# Patient Record
Sex: MALE | Race: WHITE | Employment: OTHER | URBAN - NONMETROPOLITAN AREA
[De-identification: names, ages, dates, MRNs, and addresses within clinical notes are randomized per-mention and may not be internally consistent; named-entity substitution may affect disease eponyms.]

---

## 2017-01-04 RX ORDER — VALSARTAN 160 MG/1
TABLET ORAL
Qty: 90 TABLET | Refills: 3 | Status: SHIPPED | OUTPATIENT
Start: 2017-01-04 | End: 2017-02-03

## 2017-01-16 ENCOUNTER — OFFICE VISIT (OUTPATIENT)
Dept: UROLOGY | Age: 57
End: 2017-01-16

## 2017-01-16 VITALS
SYSTOLIC BLOOD PRESSURE: 142 MMHG | BODY MASS INDEX: 42.66 KG/M2 | WEIGHT: 315 LBS | DIASTOLIC BLOOD PRESSURE: 86 MMHG | HEIGHT: 72 IN

## 2017-01-16 DIAGNOSIS — R97.20 ELEVATED PSA: Primary | ICD-10-CM

## 2017-01-16 LAB
BILIRUBIN URINE: NEGATIVE
BLOOD URINE, POC: NEGATIVE
CHARACTER, URINE: CLEAR
COLOR, URINE: YELLOW
GLUCOSE URINE: NEGATIVE MG/DL
KETONES, URINE: NEGATIVE
LEUKOCYTE CLUMPS, URINE: NEGATIVE
NITRITE, URINE: NEGATIVE
PH, URINE: 6.5
PROTEIN, URINE: NEGATIVE MG/DL
SPECIFIC GRAVITY, URINE: 1.01 (ref 1–1.03)
UROBILINOGEN, URINE: 0.2 EU/DL

## 2017-01-16 PROCEDURE — 99214 OFFICE O/P EST MOD 30 MIN: CPT | Performed by: UROLOGY

## 2017-01-16 PROCEDURE — 81003 URINALYSIS AUTO W/O SCOPE: CPT | Performed by: UROLOGY

## 2017-01-16 ASSESSMENT — ENCOUNTER SYMPTOMS
NAUSEA: 0
EYE PAIN: 0
COLOR CHANGE: 0
ABDOMINAL PAIN: 0
EYE REDNESS: 0
BACK PAIN: 0
CHEST TIGHTNESS: 0
SHORTNESS OF BREATH: 0

## 2017-02-03 ENCOUNTER — OFFICE VISIT (OUTPATIENT)
Dept: FAMILY MEDICINE CLINIC | Age: 57
End: 2017-02-03

## 2017-02-03 VITALS
SYSTOLIC BLOOD PRESSURE: 170 MMHG | TEMPERATURE: 97.5 F | BODY MASS INDEX: 42.66 KG/M2 | HEIGHT: 72 IN | DIASTOLIC BLOOD PRESSURE: 98 MMHG | WEIGHT: 315 LBS | HEART RATE: 72 BPM | RESPIRATION RATE: 24 BRPM

## 2017-02-03 DIAGNOSIS — N40.0 PROSTATE HYPERTROPHY: ICD-10-CM

## 2017-02-03 DIAGNOSIS — R73.01 IFG (IMPAIRED FASTING GLUCOSE): ICD-10-CM

## 2017-02-03 DIAGNOSIS — Z00.00 WELL ADULT EXAM: Primary | ICD-10-CM

## 2017-02-03 DIAGNOSIS — D64.9 ANEMIA, UNSPECIFIED TYPE: ICD-10-CM

## 2017-02-03 DIAGNOSIS — E66.01 MORBID OBESITY WITH BMI OF 40.0-44.9, ADULT (HCC): ICD-10-CM

## 2017-02-03 DIAGNOSIS — I10 ESSENTIAL HYPERTENSION: ICD-10-CM

## 2017-02-03 DIAGNOSIS — Z99.89 OBSTRUCTIVE SLEEP APNEA ON CPAP: ICD-10-CM

## 2017-02-03 DIAGNOSIS — F32.A DEPRESSION, UNSPECIFIED DEPRESSION TYPE: ICD-10-CM

## 2017-02-03 DIAGNOSIS — G47.33 OBSTRUCTIVE SLEEP APNEA ON CPAP: ICD-10-CM

## 2017-02-03 PROCEDURE — 99396 PREV VISIT EST AGE 40-64: CPT | Performed by: FAMILY MEDICINE

## 2017-02-03 RX ORDER — VALSARTAN AND HYDROCHLOROTHIAZIDE 160; 12.5 MG/1; MG/1
1 TABLET, FILM COATED ORAL DAILY
Qty: 30 TABLET | Refills: 1 | Status: SHIPPED | OUTPATIENT
Start: 2017-02-03 | End: 2017-03-29 | Stop reason: SDUPTHER

## 2017-02-03 RX ORDER — TAMSULOSIN HYDROCHLORIDE 0.4 MG/1
0.4 CAPSULE ORAL NIGHTLY
Qty: 90 CAPSULE | Refills: 3 | Status: SHIPPED | OUTPATIENT
Start: 2017-02-03 | End: 2018-01-11 | Stop reason: SDUPTHER

## 2017-02-03 ASSESSMENT — ENCOUNTER SYMPTOMS
ANAL BLEEDING: 0
CONSTIPATION: 0
ABDOMINAL PAIN: 0
DIARRHEA: 0
SHORTNESS OF BREATH: 0
VOMITING: 0
CHEST TIGHTNESS: 0
BLOOD IN STOOL: 0
NAUSEA: 0

## 2017-03-29 DIAGNOSIS — I10 ESSENTIAL HYPERTENSION: ICD-10-CM

## 2017-03-29 RX ORDER — VALSARTAN AND HYDROCHLOROTHIAZIDE 160; 12.5 MG/1; MG/1
1 TABLET, FILM COATED ORAL DAILY
Qty: 30 TABLET | Refills: 4 | Status: SHIPPED | OUTPATIENT
Start: 2017-03-29 | End: 2017-08-25 | Stop reason: SDUPTHER

## 2017-03-31 ENCOUNTER — TELEPHONE (OUTPATIENT)
Dept: FAMILY MEDICINE CLINIC | Age: 57
End: 2017-03-31

## 2017-08-23 ENCOUNTER — HOSPITAL ENCOUNTER (OUTPATIENT)
Age: 57
Discharge: HOME | End: 2017-08-23
Payer: COMMERCIAL

## 2017-08-23 DIAGNOSIS — R73.01 IFG (IMPAIRED FASTING GLUCOSE): ICD-10-CM

## 2017-08-23 DIAGNOSIS — R97.20 ELEVATED PSA: ICD-10-CM

## 2017-08-23 DIAGNOSIS — I10 ESSENTIAL HYPERTENSION: ICD-10-CM

## 2017-08-23 DIAGNOSIS — D64.9 ANEMIA, UNSPECIFIED TYPE: ICD-10-CM

## 2017-08-23 LAB
ALBUMIN SERPL-MCNC: 4.4 G/DL (ref 3.5–5.1)
ALP BLD-CCNC: 81 U/L (ref 38–126)
ALT SERPL-CCNC: 22 U/L (ref 11–66)
ANION GAP SERPL CALCULATED.3IONS-SCNC: 6 MEQ/L (ref 8–16)
AST SERPL-CCNC: 24 U/L (ref 5–40)
AVERAGE GLUCOSE: 123 MG/DL (ref 70–126)
BASOPHILS # BLD: 0.6 %
BASOPHILS ABSOLUTE: 0 THOU/MM3 (ref 0–0.1)
BILIRUB SERPL-MCNC: 0.6 MG/DL (ref 0.3–1.2)
BUN BLDV-MCNC: 19 MG/DL (ref 7–22)
CALCIUM SERPL-MCNC: 9.7 MG/DL (ref 8.5–10.5)
CHLORIDE BLD-SCNC: 101 MEQ/L (ref 98–111)
CHOLESTEROL, TOTAL: 188 MG/DL (ref 100–199)
CO2: 30 MEQ/L (ref 23–33)
CREAT SERPL-MCNC: 0.7 MG/DL (ref 0.4–1.2)
CREATININE, URINE: 160 MG/DL
EOSINOPHIL # BLD: 1.5 %
EOSINOPHILS ABSOLUTE: 0.1 THOU/MM3 (ref 0–0.4)
GFR SERPL CREATININE-BSD FRML MDRD: > 90 ML/MIN/1.73M2
GLUCOSE BLD-MCNC: 138 MG/DL (ref 70–108)
HBA1C MFR BLD: 6.1 % (ref 4.4–6.4)
HCT VFR BLD CALC: 43.9 % (ref 42–52)
HDLC SERPL-MCNC: 66 MG/DL
HEMOGLOBIN: 15 GM/DL (ref 14–18)
LDL CHOLESTEROL CALCULATED: 106 MG/DL
LYMPHOCYTES # BLD: 21.1 %
LYMPHOCYTES ABSOLUTE: 1.6 THOU/MM3 (ref 1–4.8)
MCH RBC QN AUTO: 30.1 PG (ref 27–31)
MCHC RBC AUTO-ENTMCNC: 34.1 GM/DL (ref 33–37)
MCV RBC AUTO: 88.3 FL (ref 80–94)
MICROALBUMIN UR-MCNC: < 1.2 MG/DL
MICROALBUMIN/CREAT UR-RTO: 7 MG/G (ref 0–30)
MONOCYTES # BLD: 7.9 %
MONOCYTES ABSOLUTE: 0.6 THOU/MM3 (ref 0.4–1.3)
NUCLEATED RED BLOOD CELLS: 0 /100 WBC
PDW BLD-RTO: 13 % (ref 11.5–14.5)
PLATELET # BLD: 217 THOU/MM3 (ref 130–400)
PMV BLD AUTO: 8.4 MCM (ref 7.4–10.4)
POTASSIUM SERPL-SCNC: 4.3 MEQ/L (ref 3.5–5.2)
PROSTATE SPECIFIC ANTIGEN: 5.16 NG/ML (ref 0–1)
RBC # BLD: 4.97 MILL/MM3 (ref 4.7–6.1)
RBC # BLD: NORMAL 10*6/UL
SEG NEUTROPHILS: 68.9 %
SEGMENTED NEUTROPHILS ABSOLUTE COUNT: 5.4 THOU/MM3 (ref 1.8–7.7)
SODIUM BLD-SCNC: 137 MEQ/L (ref 135–145)
T4 FREE: 0.94 NG/DL (ref 0.93–1.76)
TOTAL PROTEIN: 7.8 G/DL (ref 6.1–8)
TRIGL SERPL-MCNC: 82 MG/DL (ref 0–199)
TSH SERPL DL<=0.05 MIU/L-ACNC: 1.71 UIU/ML (ref 0.4–4.2)
WBC # BLD: 7.8 THOU/MM3 (ref 4.8–10.8)

## 2017-08-23 PROCEDURE — 84439 ASSAY OF FREE THYROXINE: CPT

## 2017-08-23 PROCEDURE — 80050 GENERAL HEALTH PANEL: CPT

## 2017-08-23 PROCEDURE — 83036 HEMOGLOBIN GLYCOSYLATED A1C: CPT

## 2017-08-23 PROCEDURE — 36415 COLL VENOUS BLD VENIPUNCTURE: CPT

## 2017-08-23 PROCEDURE — 82043 UR ALBUMIN QUANTITATIVE: CPT

## 2017-08-23 PROCEDURE — 84153 ASSAY OF PSA TOTAL: CPT

## 2017-08-23 PROCEDURE — 80061 LIPID PANEL: CPT

## 2017-08-25 ENCOUNTER — OFFICE VISIT (OUTPATIENT)
Dept: FAMILY MEDICINE CLINIC | Age: 57
End: 2017-08-25
Payer: COMMERCIAL

## 2017-08-25 VITALS
RESPIRATION RATE: 16 BRPM | HEART RATE: 68 BPM | TEMPERATURE: 97.8 F | DIASTOLIC BLOOD PRESSURE: 90 MMHG | WEIGHT: 315 LBS | BODY MASS INDEX: 45.9 KG/M2 | SYSTOLIC BLOOD PRESSURE: 166 MMHG

## 2017-08-25 DIAGNOSIS — E78.5 HYPERLIPIDEMIA, UNSPECIFIED HYPERLIPIDEMIA TYPE: ICD-10-CM

## 2017-08-25 DIAGNOSIS — F32.A DEPRESSION, UNSPECIFIED DEPRESSION TYPE: ICD-10-CM

## 2017-08-25 DIAGNOSIS — G47.33 OBSTRUCTIVE SLEEP APNEA ON CPAP: ICD-10-CM

## 2017-08-25 DIAGNOSIS — I10 ESSENTIAL HYPERTENSION: ICD-10-CM

## 2017-08-25 DIAGNOSIS — E66.01 MORBID OBESITY WITH BMI OF 40.0-44.9, ADULT (HCC): ICD-10-CM

## 2017-08-25 DIAGNOSIS — Z99.89 OBSTRUCTIVE SLEEP APNEA ON CPAP: ICD-10-CM

## 2017-08-25 DIAGNOSIS — R73.01 IFG (IMPAIRED FASTING GLUCOSE): Primary | ICD-10-CM

## 2017-08-25 DIAGNOSIS — N52.9 ERECTILE DYSFUNCTION, UNSPECIFIED ERECTILE DYSFUNCTION TYPE: ICD-10-CM

## 2017-08-25 PROCEDURE — 99214 OFFICE O/P EST MOD 30 MIN: CPT | Performed by: FAMILY MEDICINE

## 2017-08-25 RX ORDER — VALSARTAN AND HYDROCHLOROTHIAZIDE 160; 12.5 MG/1; MG/1
1 TABLET, FILM COATED ORAL DAILY
Qty: 90 TABLET | Refills: 3 | Status: SHIPPED | OUTPATIENT
Start: 2017-08-25 | End: 2019-03-01

## 2017-08-25 RX ORDER — ATORVASTATIN CALCIUM 10 MG/1
10 TABLET, FILM COATED ORAL DAILY
Qty: 90 TABLET | Refills: 0 | Status: SHIPPED | OUTPATIENT
Start: 2017-08-25 | End: 2018-03-16

## 2017-08-25 RX ORDER — SILDENAFIL 100 MG/1
TABLET, FILM COATED ORAL
Qty: 10 TABLET | Refills: 5 | Status: SHIPPED | OUTPATIENT
Start: 2017-08-25

## 2017-08-25 ASSESSMENT — PATIENT HEALTH QUESTIONNAIRE - PHQ9
1. LITTLE INTEREST OR PLEASURE IN DOING THINGS: 0
SUM OF ALL RESPONSES TO PHQ9 QUESTIONS 1 & 2: 0
2. FEELING DOWN, DEPRESSED OR HOPELESS: 0
SUM OF ALL RESPONSES TO PHQ QUESTIONS 1-9: 0

## 2017-08-25 ASSESSMENT — ENCOUNTER SYMPTOMS
BLOOD IN STOOL: 0
SHORTNESS OF BREATH: 0
DIARRHEA: 0
CONSTIPATION: 0
NAUSEA: 0
ABDOMINAL PAIN: 0
CHEST TIGHTNESS: 0
VOMITING: 0
ANAL BLEEDING: 0

## 2017-08-31 ENCOUNTER — TELEPHONE (OUTPATIENT)
Dept: FAMILY MEDICINE CLINIC | Age: 57
End: 2017-08-31

## 2017-08-31 DIAGNOSIS — E78.5 HYPERLIPIDEMIA, UNSPECIFIED HYPERLIPIDEMIA TYPE: Primary | ICD-10-CM

## 2018-01-11 DIAGNOSIS — N40.0 PROSTATE HYPERTROPHY: ICD-10-CM

## 2018-01-11 RX ORDER — TAMSULOSIN HYDROCHLORIDE 0.4 MG/1
CAPSULE ORAL
Qty: 90 CAPSULE | Refills: 3 | Status: SHIPPED | OUTPATIENT
Start: 2018-01-11 | End: 2019-01-07 | Stop reason: SDUPTHER

## 2018-02-19 DIAGNOSIS — N40.0 PROSTATE HYPERTROPHY: Primary | ICD-10-CM

## 2018-02-22 ENCOUNTER — HOSPITAL ENCOUNTER (OUTPATIENT)
Age: 58
Discharge: HOME | End: 2018-02-22
Payer: COMMERCIAL

## 2018-02-22 DIAGNOSIS — N40.0 PROSTATE HYPERTROPHY: ICD-10-CM

## 2018-02-22 LAB — PROSTATE SPECIFIC ANTIGEN: 4.26 NG/ML (ref 0–1)

## 2018-02-22 PROCEDURE — 84153 ASSAY OF PSA TOTAL: CPT

## 2018-02-22 PROCEDURE — 36415 COLL VENOUS BLD VENIPUNCTURE: CPT

## 2018-03-02 ENCOUNTER — OFFICE VISIT (OUTPATIENT)
Dept: UROLOGY | Age: 58
End: 2018-03-02
Payer: COMMERCIAL

## 2018-03-02 VITALS
HEIGHT: 72 IN | WEIGHT: 315 LBS | DIASTOLIC BLOOD PRESSURE: 78 MMHG | SYSTOLIC BLOOD PRESSURE: 130 MMHG | BODY MASS INDEX: 42.66 KG/M2

## 2018-03-02 DIAGNOSIS — R97.20 ELEVATED PSA: Primary | ICD-10-CM

## 2018-03-02 LAB
BILIRUBIN URINE: NEGATIVE
BLOOD URINE, POC: NORMAL
CHARACTER, URINE: CLEAR
COLOR, URINE: YELLOW
GLUCOSE URINE: NEGATIVE MG/DL
KETONES, URINE: NEGATIVE
LEUKOCYTE CLUMPS, URINE: NEGATIVE
NITRITE, URINE: NEGATIVE
PH, URINE: 6
PROTEIN, URINE: NEGATIVE MG/DL
SPECIFIC GRAVITY, URINE: 1.02 (ref 1–1.03)
UROBILINOGEN, URINE: 0.2 EU/DL

## 2018-03-02 PROCEDURE — 99213 OFFICE O/P EST LOW 20 MIN: CPT | Performed by: NURSE PRACTITIONER

## 2018-03-02 PROCEDURE — 81003 URINALYSIS AUTO W/O SCOPE: CPT | Performed by: NURSE PRACTITIONER

## 2018-03-02 ASSESSMENT — ENCOUNTER SYMPTOMS
ABDOMINAL PAIN: 0
VOMITING: 0
NAUSEA: 0

## 2018-03-13 ENCOUNTER — HOSPITAL ENCOUNTER (OUTPATIENT)
Age: 58
Discharge: HOME | End: 2018-03-13
Payer: COMMERCIAL

## 2018-03-13 DIAGNOSIS — E78.5 HYPERLIPIDEMIA, UNSPECIFIED HYPERLIPIDEMIA TYPE: ICD-10-CM

## 2018-03-13 LAB
CHOLESTEROL, TOTAL: 158 MG/DL (ref 100–199)
HDLC SERPL-MCNC: 50 MG/DL
LDL CHOLESTEROL CALCULATED: 87 MG/DL
TRIGL SERPL-MCNC: 104 MG/DL (ref 0–199)

## 2018-03-13 PROCEDURE — 36415 COLL VENOUS BLD VENIPUNCTURE: CPT

## 2018-03-13 PROCEDURE — 80061 LIPID PANEL: CPT

## 2018-03-16 ENCOUNTER — OFFICE VISIT (OUTPATIENT)
Dept: FAMILY MEDICINE CLINIC | Age: 58
End: 2018-03-16
Payer: COMMERCIAL

## 2018-03-16 VITALS
HEART RATE: 60 BPM | RESPIRATION RATE: 16 BRPM | TEMPERATURE: 97.5 F | HEIGHT: 72 IN | DIASTOLIC BLOOD PRESSURE: 82 MMHG | SYSTOLIC BLOOD PRESSURE: 130 MMHG | WEIGHT: 315 LBS | BODY MASS INDEX: 42.66 KG/M2

## 2018-03-16 DIAGNOSIS — E78.5 HYPERLIPIDEMIA, UNSPECIFIED HYPERLIPIDEMIA TYPE: ICD-10-CM

## 2018-03-16 DIAGNOSIS — N52.9 ERECTILE DYSFUNCTION, UNSPECIFIED ERECTILE DYSFUNCTION TYPE: ICD-10-CM

## 2018-03-16 DIAGNOSIS — L72.3 SEBACEOUS CYST: ICD-10-CM

## 2018-03-16 DIAGNOSIS — I10 ESSENTIAL HYPERTENSION: ICD-10-CM

## 2018-03-16 DIAGNOSIS — Z72.89 OTHER PROBLEMS RELATED TO LIFESTYLE: ICD-10-CM

## 2018-03-16 DIAGNOSIS — L82.1 SEBORRHEIC KERATOSES: ICD-10-CM

## 2018-03-16 DIAGNOSIS — D22.9 ATYPICAL NEVI: ICD-10-CM

## 2018-03-16 DIAGNOSIS — R73.01 IFG (IMPAIRED FASTING GLUCOSE): Primary | ICD-10-CM

## 2018-03-16 PROCEDURE — 99214 OFFICE O/P EST MOD 30 MIN: CPT | Performed by: FAMILY MEDICINE

## 2018-03-16 RX ORDER — OMEGA-3 FATTY ACIDS CAP DELAYED RELEASE 1000 MG 1000 MG
1000 CAPSULE DELAYED RELEASE ORAL DAILY
COMMUNITY
End: 2022-09-08

## 2018-03-16 ASSESSMENT — ENCOUNTER SYMPTOMS
DIARRHEA: 0
CHEST TIGHTNESS: 0
BLOOD IN STOOL: 0
NAUSEA: 0
VOMITING: 0
SHORTNESS OF BREATH: 0
ABDOMINAL PAIN: 0
ANAL BLEEDING: 0
CONSTIPATION: 0

## 2018-03-17 LAB
AVERAGE GLUCOSE: 126 MG/DL (ref 66–114)
HBA1C MFR BLD: 6 % (ref 4.2–5.8)
HEPATITIS C ANTIBODY: NEGATIVE

## 2018-03-19 LAB — HIV 1,2 COMBO ANTIGEN/ANTIBODY: NORMAL

## 2018-03-23 ENCOUNTER — OFFICE VISIT (OUTPATIENT)
Dept: PULMONOLOGY | Age: 58
End: 2018-03-23
Payer: COMMERCIAL

## 2018-03-23 VITALS
DIASTOLIC BLOOD PRESSURE: 84 MMHG | SYSTOLIC BLOOD PRESSURE: 134 MMHG | BODY MASS INDEX: 42.66 KG/M2 | WEIGHT: 315 LBS | OXYGEN SATURATION: 97 % | HEIGHT: 72 IN | HEART RATE: 60 BPM

## 2018-03-23 DIAGNOSIS — G47.33 OSA ON CPAP: Primary | ICD-10-CM

## 2018-03-23 DIAGNOSIS — Z99.89 OSA ON CPAP: Primary | ICD-10-CM

## 2018-03-23 PROCEDURE — 99213 OFFICE O/P EST LOW 20 MIN: CPT | Performed by: INTERNAL MEDICINE

## 2018-04-27 ENCOUNTER — PROCEDURE VISIT (OUTPATIENT)
Dept: FAMILY MEDICINE CLINIC | Age: 58
End: 2018-04-27
Payer: COMMERCIAL

## 2018-04-27 VITALS
WEIGHT: 315 LBS | SYSTOLIC BLOOD PRESSURE: 130 MMHG | RESPIRATION RATE: 20 BRPM | HEIGHT: 72 IN | HEART RATE: 64 BPM | DIASTOLIC BLOOD PRESSURE: 84 MMHG | BODY MASS INDEX: 42.66 KG/M2 | TEMPERATURE: 98.1 F

## 2018-04-27 DIAGNOSIS — D22.9 ATYPICAL NEVI: ICD-10-CM

## 2018-04-27 DIAGNOSIS — D23.5 DYSPLASTIC NEVUS OF TRUNK: Primary | ICD-10-CM

## 2018-04-27 PROCEDURE — 11402 EXC TR-EXT B9+MARG 1.1-2 CM: CPT | Performed by: FAMILY MEDICINE

## 2018-05-07 ENCOUNTER — OFFICE VISIT (OUTPATIENT)
Dept: FAMILY MEDICINE CLINIC | Age: 58
End: 2018-05-07

## 2018-05-07 VITALS
HEART RATE: 64 BPM | TEMPERATURE: 98.1 F | DIASTOLIC BLOOD PRESSURE: 78 MMHG | BODY MASS INDEX: 42.66 KG/M2 | SYSTOLIC BLOOD PRESSURE: 138 MMHG | WEIGHT: 315 LBS | HEIGHT: 72 IN | RESPIRATION RATE: 16 BRPM

## 2018-05-07 DIAGNOSIS — Z48.02 VISIT FOR SUTURE REMOVAL: Primary | ICD-10-CM

## 2018-05-07 PROCEDURE — 99024 POSTOP FOLLOW-UP VISIT: CPT | Performed by: FAMILY MEDICINE

## 2018-08-02 ENCOUNTER — TELEPHONE (OUTPATIENT)
Dept: FAMILY MEDICINE CLINIC | Age: 58
End: 2018-08-02

## 2018-08-02 DIAGNOSIS — I10 ESSENTIAL HYPERTENSION: ICD-10-CM

## 2018-08-02 RX ORDER — VALSARTAN AND HYDROCHLOROTHIAZIDE 160; 12.5 MG/1; MG/1
TABLET, FILM COATED ORAL
Qty: 90 TABLET | Refills: 3 | OUTPATIENT
Start: 2018-08-02

## 2018-08-03 RX ORDER — LOSARTAN POTASSIUM AND HYDROCHLOROTHIAZIDE 12.5; 1 MG/1; MG/1
1 TABLET ORAL DAILY
Qty: 30 TABLET | Refills: 1 | Status: SHIPPED | OUTPATIENT
Start: 2018-08-03 | End: 2018-09-26 | Stop reason: SDUPTHER

## 2018-09-26 ENCOUNTER — HOSPITAL ENCOUNTER (OUTPATIENT)
Age: 58
Discharge: HOME | End: 2018-09-26
Payer: COMMERCIAL

## 2018-09-26 DIAGNOSIS — R97.20 ELEVATED PSA: ICD-10-CM

## 2018-09-26 LAB — PROSTATE SPECIFIC ANTIGEN: 5.49 NG/ML (ref 0–1)

## 2018-09-26 PROCEDURE — 36415 COLL VENOUS BLD VENIPUNCTURE: CPT

## 2018-09-26 PROCEDURE — 84153 ASSAY OF PSA TOTAL: CPT

## 2018-09-26 RX ORDER — LOSARTAN POTASSIUM AND HYDROCHLOROTHIAZIDE 12.5; 1 MG/1; MG/1
TABLET ORAL
Qty: 90 TABLET | Refills: 3 | Status: SHIPPED | OUTPATIENT
Start: 2018-09-26 | End: 2019-09-24 | Stop reason: SDUPTHER

## 2019-01-07 DIAGNOSIS — N40.0 PROSTATE HYPERTROPHY: ICD-10-CM

## 2019-01-07 RX ORDER — TAMSULOSIN HYDROCHLORIDE 0.4 MG/1
CAPSULE ORAL
Qty: 90 CAPSULE | Refills: 3 | Status: SHIPPED | OUTPATIENT
Start: 2019-01-07 | End: 2020-01-06

## 2019-02-28 ENCOUNTER — HOSPITAL ENCOUNTER (OUTPATIENT)
Age: 59
Discharge: HOME | End: 2019-02-28
Payer: COMMERCIAL

## 2019-02-28 DIAGNOSIS — R97.20 ELEVATED PSA: ICD-10-CM

## 2019-02-28 LAB — PROSTATE SPECIFIC ANTIGEN: 5.01 NG/ML (ref 0–1)

## 2019-02-28 PROCEDURE — 84153 ASSAY OF PSA TOTAL: CPT

## 2019-02-28 PROCEDURE — 36415 COLL VENOUS BLD VENIPUNCTURE: CPT

## 2019-03-01 ENCOUNTER — OFFICE VISIT (OUTPATIENT)
Dept: UROLOGY | Age: 59
End: 2019-03-01
Payer: COMMERCIAL

## 2019-03-01 VITALS
WEIGHT: 315 LBS | BODY MASS INDEX: 42.66 KG/M2 | DIASTOLIC BLOOD PRESSURE: 80 MMHG | HEIGHT: 72 IN | SYSTOLIC BLOOD PRESSURE: 122 MMHG

## 2019-03-01 DIAGNOSIS — R97.20 ELEVATED PSA: Primary | ICD-10-CM

## 2019-03-01 LAB
BILIRUBIN URINE: NEGATIVE
BLOOD URINE, POC: NEGATIVE
CHARACTER, URINE: CLEAR
COLOR, URINE: YELLOW
GLUCOSE URINE: NEGATIVE MG/DL
KETONES, URINE: NEGATIVE
LEUKOCYTE CLUMPS, URINE: NEGATIVE
NITRITE, URINE: NEGATIVE
PH, URINE: 6
PROTEIN, URINE: NEGATIVE MG/DL
SPECIFIC GRAVITY, URINE: 1.02 (ref 1–1.03)
UROBILINOGEN, URINE: 1 EU/DL

## 2019-03-01 PROCEDURE — 81003 URINALYSIS AUTO W/O SCOPE: CPT | Performed by: NURSE PRACTITIONER

## 2019-03-01 PROCEDURE — 99213 OFFICE O/P EST LOW 20 MIN: CPT | Performed by: NURSE PRACTITIONER

## 2019-03-01 ASSESSMENT — ENCOUNTER SYMPTOMS
NAUSEA: 0
ABDOMINAL PAIN: 0
VOMITING: 0

## 2019-06-29 DIAGNOSIS — Z12.5 SCREENING FOR PROSTATE CANCER: ICD-10-CM

## 2019-06-29 DIAGNOSIS — R73.01 IFG (IMPAIRED FASTING GLUCOSE): ICD-10-CM

## 2019-06-29 DIAGNOSIS — I10 ESSENTIAL HYPERTENSION: ICD-10-CM

## 2019-06-29 DIAGNOSIS — Z00.00 ANNUAL PHYSICAL EXAM: Primary | ICD-10-CM

## 2019-07-02 NOTE — TELEPHONE ENCOUNTER
Incoming request from [employer] for refill on Zoloft 50 mg qd. Last seen 5/7/18. LMTCB with patient as he is overdue for appt. ES--ok for refill to get patient by until return call received and appt made? Order pended for #90/NR.

## 2019-07-26 ENCOUNTER — OFFICE VISIT (OUTPATIENT)
Dept: PULMONOLOGY | Age: 59
End: 2019-07-26
Payer: COMMERCIAL

## 2019-07-26 VITALS
HEIGHT: 72 IN | WEIGHT: 315 LBS | OXYGEN SATURATION: 95 % | HEART RATE: 67 BPM | DIASTOLIC BLOOD PRESSURE: 72 MMHG | SYSTOLIC BLOOD PRESSURE: 128 MMHG | BODY MASS INDEX: 42.66 KG/M2

## 2019-07-26 DIAGNOSIS — E66.01 MORBID OBESITY WITH BMI OF 40.0-44.9, ADULT (HCC): ICD-10-CM

## 2019-07-26 DIAGNOSIS — Z99.89 OBSTRUCTIVE SLEEP APNEA ON CPAP: Primary | ICD-10-CM

## 2019-07-26 DIAGNOSIS — G47.33 OBSTRUCTIVE SLEEP APNEA ON CPAP: Primary | ICD-10-CM

## 2019-07-26 PROCEDURE — 99213 OFFICE O/P EST LOW 20 MIN: CPT | Performed by: PHYSICIAN ASSISTANT

## 2019-07-26 NOTE — PROGRESS NOTES
Mouth/Throat: Oropharynx is clear and moist.   Eyes: Pupils are equal, round, and reactive to light. Conjunctivae and EOM are normal.   Neck: Normal range of motion. Neck supple. Cardiovascular: Normal rate, regular rhythm and normal heart sounds. Pulmonary/Chest: Effort normal and breath sounds normal.   Abdominal: Soft. Musculoskeletal: Normal range of motion. Neurological: He is alert and oriented to person, place, and time. Skin: Skin is warm and dry. Psychiatric: He has a normal mood and affect. His behavior is normal. Judgment and thought content normal.         ASSESSMENT/DIAGNOSIS     Diagnosis Orders   1. Obstructive sleep apnea on CPAP  DME Order for CPAP as OP   2. Morbid obesity with BMI of 40.0-44.9, adult [facility])              Plan   Do you need any equipment today? Yes update supplies    - He  was advised to continue current positive airway pressure therapy with above described pressure. - He  advised to keep goodcompliance with current recommended pressure to get optimal results and clinical improvement  - Recommend 7-9 hours of sleep with PAP  - He was advised to call DME company regarding supplies if needed.   -He call my office for earlier appointment if needed for worsening of sleep symptoms.   - He was instructed on weight loss  - Michelle Morris was educated about my impression and plan. Patient verbalizesunderstanding.   We will see Kesha Ly back in: 1 year with download    Information added by my medical assistant/LPN was reviewed today         Steve Cortés PA-C, MPAS  7/26/2019

## 2019-09-24 RX ORDER — LOSARTAN POTASSIUM AND HYDROCHLOROTHIAZIDE 12.5; 1 MG/1; MG/1
TABLET ORAL
Qty: 90 TABLET | Refills: 3 | Status: SHIPPED | OUTPATIENT
Start: 2019-09-24 | End: 2019-10-09 | Stop reason: ALTCHOICE

## 2019-10-01 LAB
ALBUMIN SERPL-MCNC: 4.6 G/DL (ref 3.2–5.3)
ALK PHOSPHATASE: 87 U/L (ref 39–130)
ALT SERPL-CCNC: 27 U/L (ref 0–40)
ANION GAP SERPL CALCULATED.3IONS-SCNC: 9 MMOL/L (ref 4–12)
AST SERPL-CCNC: 27 U/L (ref 0–41)
AVERAGE GLUCOSE: 154 MG/DL
BILIRUB SERPL-MCNC: 0.8 MG/DL (ref 0.3–1.2)
BUN BLDV-MCNC: 14 MG/DL (ref 5–23)
CALCIUM SERPL-MCNC: 9.8 MG/DL (ref 8.5–10.5)
CHLORIDE BLD-SCNC: 101 MMOL/L (ref 98–109)
CHOLESTEROL/HDL RATIO: 3.3 (ref 1–5)
CHOLESTEROL: 193 MG/DL (ref 150–200)
CO2: 30 MMOL/L (ref 22–32)
CREAT SERPL-MCNC: 0.67 MG/DL (ref 0.6–1.3)
EGFR AFRICAN AMERICAN: >60 ML/MIN/1.73SQ.M
EGFR IF NONAFRICAN AMERICAN: >60 ML/MIN/1.73SQ.M
GLUCOSE: 155 MG/DL (ref 65–99)
HBA1C MFR BLD: 7 % (ref 4.4–6.4)
HDLC SERPL-MCNC: 59 MG/DL
LDL CHOLESTEROL CALCULATED: 116 MG/DL
LDL/HDL RATIO: 2
POTASSIUM SERPL-SCNC: 4.2 MMOL/L (ref 3.5–5)
PSA, ULTRASENSITIVE: 7.07 NG/ML (ref 0–4)
SODIUM BLD-SCNC: 140 MMOL/L (ref 134–146)
TOTAL PROTEIN: 7.5 G/DL (ref 6–8)
TRIGL SERPL-MCNC: 91 MG/DL (ref 27–150)
VLDLC SERPL CALC-MCNC: 18 MG/DL (ref 0–30)

## 2019-10-02 ENCOUNTER — TELEPHONE (OUTPATIENT)
Dept: UROLOGY | Age: 59
End: 2019-10-02

## 2019-10-02 DIAGNOSIS — R97.20 ELEVATED PSA: Primary | ICD-10-CM

## 2019-10-09 ENCOUNTER — OFFICE VISIT (OUTPATIENT)
Dept: FAMILY MEDICINE CLINIC | Age: 59
End: 2019-10-09
Payer: COMMERCIAL

## 2019-10-09 VITALS
WEIGHT: 315 LBS | TEMPERATURE: 97.9 F | DIASTOLIC BLOOD PRESSURE: 86 MMHG | HEART RATE: 76 BPM | SYSTOLIC BLOOD PRESSURE: 146 MMHG | BODY MASS INDEX: 46.04 KG/M2 | RESPIRATION RATE: 16 BRPM

## 2019-10-09 DIAGNOSIS — E11.9 TYPE 2 DIABETES MELLITUS WITHOUT COMPLICATION, WITHOUT LONG-TERM CURRENT USE OF INSULIN (HCC): ICD-10-CM

## 2019-10-09 DIAGNOSIS — N52.9 ERECTILE DYSFUNCTION, UNSPECIFIED ERECTILE DYSFUNCTION TYPE: ICD-10-CM

## 2019-10-09 DIAGNOSIS — I10 ESSENTIAL HYPERTENSION: ICD-10-CM

## 2019-10-09 DIAGNOSIS — Z00.00 WELL ADULT EXAM: Primary | ICD-10-CM

## 2019-10-09 DIAGNOSIS — Z23 NEED FOR PROPHYLACTIC VACCINATION AGAINST STREPTOCOCCUS PNEUMONIAE (PNEUMOCOCCUS): ICD-10-CM

## 2019-10-09 PROCEDURE — 99396 PREV VISIT EST AGE 40-64: CPT | Performed by: FAMILY MEDICINE

## 2019-10-09 PROCEDURE — 90732 PPSV23 VACC 2 YRS+ SUBQ/IM: CPT | Performed by: FAMILY MEDICINE

## 2019-10-09 PROCEDURE — 90471 IMMUNIZATION ADMIN: CPT | Performed by: FAMILY MEDICINE

## 2019-10-09 RX ORDER — GLUCOSAMINE HCL/CHONDROITIN SU 500-400 MG
CAPSULE ORAL
Qty: 200 STRIP | Refills: 3 | Status: SHIPPED | OUTPATIENT
Start: 2019-10-09 | End: 2020-10-13

## 2019-10-09 RX ORDER — DOCUSATE SODIUM 100 MG/1
100 CAPSULE, LIQUID FILLED ORAL PRN
COMMUNITY

## 2019-10-09 RX ORDER — HYDROCHLOROTHIAZIDE 12.5 MG/1
12.5 CAPSULE, GELATIN COATED ORAL EVERY MORNING
Qty: 90 CAPSULE | Refills: 1 | Status: SHIPPED | OUTPATIENT
Start: 2019-10-09 | End: 2020-02-14 | Stop reason: SDUPTHER

## 2019-10-09 RX ORDER — LOSARTAN POTASSIUM 100 MG/1
100 TABLET ORAL DAILY
Qty: 90 TABLET | Refills: 1 | Status: SHIPPED | OUTPATIENT
Start: 2019-10-09 | End: 2020-02-14 | Stop reason: SDUPTHER

## 2019-10-09 RX ORDER — LANCETS 30 GAUGE
EACH MISCELLANEOUS
Qty: 200 EACH | Refills: 3 | Status: SHIPPED | OUTPATIENT
Start: 2019-10-09

## 2019-10-09 ASSESSMENT — ENCOUNTER SYMPTOMS
DIARRHEA: 0
SHORTNESS OF BREATH: 0
BLOOD IN STOOL: 0
ANAL BLEEDING: 0
CHEST TIGHTNESS: 0
VOMITING: 0
NAUSEA: 0
CONSTIPATION: 0
ABDOMINAL PAIN: 0

## 2019-10-15 ENCOUNTER — TELEPHONE (OUTPATIENT)
Dept: FAMILY MEDICINE CLINIC | Age: 59
End: 2019-10-15

## 2019-10-22 DIAGNOSIS — R97.20 ELEVATED PSA: Primary | ICD-10-CM

## 2019-10-26 LAB — PSA, ULTRASENSITIVE: 6.27 NG/ML (ref 0–4)

## 2019-11-01 ENCOUNTER — OFFICE VISIT (OUTPATIENT)
Dept: UROLOGY | Age: 59
End: 2019-11-01
Payer: COMMERCIAL

## 2019-11-01 VITALS
DIASTOLIC BLOOD PRESSURE: 76 MMHG | SYSTOLIC BLOOD PRESSURE: 136 MMHG | HEIGHT: 72 IN | WEIGHT: 315 LBS | BODY MASS INDEX: 42.66 KG/M2

## 2019-11-01 DIAGNOSIS — R97.20 ELEVATED PSA: Primary | ICD-10-CM

## 2019-11-01 LAB
BILIRUBIN, POC: NORMAL
BLOOD URINE, POC: NORMAL
CLARITY, POC: NORMAL
COLOR, POC: NORMAL
GLUCOSE URINE, POC: NORMAL
KETONES, POC: NORMAL
LEUKOCYTE EST, POC: NORMAL
NITRITE, POC: NORMAL
PH, POC: NORMAL
PROTEIN, POC: NORMAL
SPECIFIC GRAVITY, POC: NORMAL
UROBILINOGEN, POC: NORMAL

## 2019-11-01 PROCEDURE — 81003 URINALYSIS AUTO W/O SCOPE: CPT | Performed by: NURSE PRACTITIONER

## 2019-11-01 PROCEDURE — 99213 OFFICE O/P EST LOW 20 MIN: CPT | Performed by: NURSE PRACTITIONER

## 2019-11-06 ENCOUNTER — NURSE ONLY (OUTPATIENT)
Dept: UROLOGY | Age: 59
End: 2019-11-06

## 2019-11-06 ENCOUNTER — TELEPHONE (OUTPATIENT)
Dept: UROLOGY | Age: 59
End: 2019-11-06

## 2019-11-06 DIAGNOSIS — R97.20 ELEVATED PSA: Primary | ICD-10-CM

## 2019-11-18 ENCOUNTER — HOSPITAL ENCOUNTER (OUTPATIENT)
Dept: MRI IMAGING | Age: 59
Discharge: HOME | End: 2019-11-18
Payer: COMMERCIAL

## 2019-11-18 DIAGNOSIS — R97.20 ELEVATED PSA: ICD-10-CM

## 2019-11-18 LAB — POC CREATININE WHOLE BLOOD: 0.7 MG/DL (ref 0.5–1.2)

## 2019-11-18 PROCEDURE — 82565 ASSAY OF CREATININE: CPT

## 2019-11-18 PROCEDURE — 76377 3D RENDER W/INTRP POSTPROCES: CPT

## 2019-11-18 PROCEDURE — A9579 GAD-BASE MR CONTRAST NOS,1ML: HCPCS | Performed by: NURSE PRACTITIONER

## 2019-11-18 PROCEDURE — 6360000004 HC RX CONTRAST MEDICATION: Performed by: NURSE PRACTITIONER

## 2019-11-18 RX ADMIN — GADOTERIDOL 20 ML: 279.3 INJECTION, SOLUTION INTRAVENOUS at 20:08

## 2019-11-22 ENCOUNTER — TELEPHONE (OUTPATIENT)
Dept: FAMILY MEDICINE CLINIC | Age: 59
End: 2019-11-22

## 2019-11-22 ENCOUNTER — TELEPHONE (OUTPATIENT)
Dept: UROLOGY | Age: 59
End: 2019-11-22

## 2019-11-22 DIAGNOSIS — R97.20 ELEVATED PSA: Primary | ICD-10-CM

## 2019-11-25 ENCOUNTER — TELEPHONE (OUTPATIENT)
Dept: UROLOGY | Age: 59
End: 2019-11-25

## 2019-11-25 ENCOUNTER — OFFICE VISIT (OUTPATIENT)
Dept: INTERNAL MEDICINE CLINIC | Age: 59
End: 2019-11-25
Payer: COMMERCIAL

## 2019-11-25 VITALS
HEIGHT: 72 IN | BODY MASS INDEX: 42.66 KG/M2 | HEART RATE: 64 BPM | DIASTOLIC BLOOD PRESSURE: 82 MMHG | WEIGHT: 315 LBS | SYSTOLIC BLOOD PRESSURE: 136 MMHG

## 2019-11-25 DIAGNOSIS — E11.9 TYPE 2 DIABETES MELLITUS WITHOUT COMPLICATION, WITHOUT LONG-TERM CURRENT USE OF INSULIN (HCC): ICD-10-CM

## 2019-11-25 PROCEDURE — G0108 DIAB MANAGE TRN  PER INDIV: HCPCS | Performed by: INTERNAL MEDICINE

## 2019-11-25 RX ORDER — CIPROFLOXACIN 500 MG/1
TABLET, FILM COATED ORAL
Qty: 6 TABLET | Refills: 0 | Status: SHIPPED | OUTPATIENT
Start: 2019-11-25 | End: 2019-11-25 | Stop reason: ALTCHOICE

## 2019-11-25 RX ORDER — GENTAMICIN SULFATE 40 MG/ML
80 INJECTION, SOLUTION INTRAMUSCULAR; INTRAVENOUS ONCE
Qty: 2 ML | Refills: 0 | Status: SHIPPED | OUTPATIENT
Start: 2019-11-25 | End: 2019-11-25 | Stop reason: ALTCHOICE

## 2019-12-04 ENCOUNTER — PROCEDURE VISIT (OUTPATIENT)
Dept: UROLOGY | Age: 59
End: 2019-12-04
Payer: COMMERCIAL

## 2019-12-04 VITALS
SYSTOLIC BLOOD PRESSURE: 124 MMHG | BODY MASS INDEX: 42.66 KG/M2 | DIASTOLIC BLOOD PRESSURE: 70 MMHG | HEIGHT: 72 IN | WEIGHT: 315 LBS

## 2019-12-04 DIAGNOSIS — R97.20 ELEVATED PSA: Primary | ICD-10-CM

## 2019-12-04 LAB
BILIRUBIN URINE: NEGATIVE
BLOOD URINE, POC: NEGATIVE
CHARACTER, URINE: CLEAR
COLOR, URINE: YELLOW
GLUCOSE URINE: NEGATIVE MG/DL
KETONES, URINE: NEGATIVE
LEUKOCYTE CLUMPS, URINE: NEGATIVE
NITRITE, URINE: NEGATIVE
PH, URINE: 5.5 (ref 5–9)
PROTEIN, URINE: NEGATIVE MG/DL
SPECIFIC GRAVITY, URINE: >= 1.03 (ref 1–1.03)
UROBILINOGEN, URINE: 1 EU/DL (ref 0–1)

## 2019-12-04 PROCEDURE — 55700 PR BIOPSY OF PROSTATE,NEEDLE/PUNCH: CPT | Performed by: UROLOGY

## 2019-12-04 PROCEDURE — 81003 URINALYSIS AUTO W/O SCOPE: CPT | Performed by: UROLOGY

## 2019-12-04 PROCEDURE — 96372 THER/PROPH/DIAG INJ SC/IM: CPT | Performed by: UROLOGY

## 2019-12-04 PROCEDURE — 76872 US TRANSRECTAL: CPT | Performed by: UROLOGY

## 2019-12-04 RX ORDER — GENTAMICIN SULFATE 40 MG/ML
80 INJECTION, SOLUTION INTRAMUSCULAR; INTRAVENOUS ONCE
Status: COMPLETED | OUTPATIENT
Start: 2019-12-04 | End: 2019-12-04

## 2019-12-04 RX ADMIN — GENTAMICIN SULFATE 80 MG: 40 INJECTION, SOLUTION INTRAMUSCULAR; INTRAVENOUS at 10:23

## 2019-12-10 ENCOUNTER — TELEPHONE (OUTPATIENT)
Dept: UROLOGY | Age: 59
End: 2019-12-10

## 2019-12-10 DIAGNOSIS — R97.20 ELEVATED PSA: Primary | ICD-10-CM

## 2019-12-17 ENCOUNTER — NURSE ONLY (OUTPATIENT)
Dept: INTERNAL MEDICINE CLINIC | Age: 59
End: 2019-12-17
Payer: COMMERCIAL

## 2019-12-17 DIAGNOSIS — E11.9 TYPE 2 DIABETES MELLITUS WITHOUT COMPLICATION, WITHOUT LONG-TERM CURRENT USE OF INSULIN (HCC): ICD-10-CM

## 2019-12-17 PROCEDURE — G0109 DIAB MANAGE TRN IND/GROUP: HCPCS | Performed by: INTERNAL MEDICINE

## 2019-12-23 ENCOUNTER — NURSE ONLY (OUTPATIENT)
Dept: INTERNAL MEDICINE CLINIC | Age: 59
End: 2019-12-23
Payer: COMMERCIAL

## 2019-12-23 DIAGNOSIS — E11.9 TYPE 2 DIABETES MELLITUS WITHOUT COMPLICATION, WITHOUT LONG-TERM CURRENT USE OF INSULIN (HCC): ICD-10-CM

## 2019-12-23 PROCEDURE — 97804 MEDICAL NUTRITION GROUP: CPT | Performed by: DIETITIAN, REGISTERED

## 2020-01-21 NOTE — TELEPHONE ENCOUNTER
01/21/2020   Joseph Carroll called requesting a refill on the following medications:  Requested Prescriptions      No prescriptions requested or ordered in this encounter     Pharmacy verified:  [email or web address]      Date of last visit: 10/09/2019  Date of next visit (if applicable): 1/39/2775

## 2020-01-22 NOTE — TELEPHONE ENCOUNTER
Patient is out of Zoloft 50 mg and is requesting a script sent to [facility] while waiting for [facility].

## 2020-02-05 RX ORDER — LOSARTAN POTASSIUM AND HYDROCHLOROTHIAZIDE 12.5; 1 MG/1; MG/1
TABLET ORAL
Qty: 90 TABLET | Refills: 3 | OUTPATIENT
Start: 2020-02-05

## 2020-02-05 NOTE — TELEPHONE ENCOUNTER
Rx last written for Losartan 100 mg and HCTZ 12.5 mg #90 with 1 refill and sent to [facility] on 10/9/19. Request is too soon. Rx refused.

## 2020-02-07 ENCOUNTER — NURSE ONLY (OUTPATIENT)
Dept: LAB | Age: 60
End: 2020-02-07

## 2020-02-07 LAB
AVERAGE GLUCOSE: 132 MG/DL (ref 70–126)
CREATININE, URINE: 269.3 MG/DL
HBA1C MFR BLD: 6.4 % (ref 4.4–6.4)
LDL CHOLESTEROL DIRECT: 99.42 MG/DL
MICROALBUMIN UR-MCNC: 1.9 MG/DL
MICROALBUMIN/CREAT UR-RTO: 7 MG/G (ref 0–30)
T4 FREE: 1.12 NG/DL (ref 0.93–1.76)
TSH SERPL DL<=0.05 MIU/L-ACNC: 1.57 UIU/ML (ref 0.4–4.2)

## 2020-02-10 RX ORDER — LOSARTAN POTASSIUM 100 MG/1
100 TABLET ORAL DAILY
Qty: 90 TABLET | Refills: 1 | OUTPATIENT
Start: 2020-02-10

## 2020-02-14 RX ORDER — LOSARTAN POTASSIUM AND HYDROCHLOROTHIAZIDE 12.5; 1 MG/1; MG/1
1 TABLET ORAL DAILY
Qty: 90 TABLET | Refills: 3 | Status: SHIPPED | OUTPATIENT
Start: 2020-02-14 | End: 2020-02-17 | Stop reason: ALTCHOICE

## 2020-02-17 ENCOUNTER — OFFICE VISIT (OUTPATIENT)
Dept: FAMILY MEDICINE CLINIC | Age: 60
End: 2020-02-17
Payer: COMMERCIAL

## 2020-02-17 VITALS
TEMPERATURE: 98.2 F | DIASTOLIC BLOOD PRESSURE: 68 MMHG | WEIGHT: 315 LBS | RESPIRATION RATE: 16 BRPM | SYSTOLIC BLOOD PRESSURE: 116 MMHG | BODY MASS INDEX: 43.13 KG/M2 | HEART RATE: 60 BPM

## 2020-02-17 PROCEDURE — 99214 OFFICE O/P EST MOD 30 MIN: CPT | Performed by: FAMILY MEDICINE

## 2020-02-17 RX ORDER — HYDROCHLOROTHIAZIDE 12.5 MG/1
12.5 CAPSULE, GELATIN COATED ORAL DAILY
COMMUNITY
End: 2020-03-13 | Stop reason: SDUPTHER

## 2020-02-17 RX ORDER — LOSARTAN POTASSIUM 100 MG/1
100 TABLET ORAL DAILY
COMMUNITY
End: 2020-03-13 | Stop reason: SDUPTHER

## 2020-02-17 ASSESSMENT — ENCOUNTER SYMPTOMS
NAUSEA: 0
VOMITING: 0
DIARRHEA: 0
ABDOMINAL PAIN: 0
SHORTNESS OF BREATH: 0
BLOOD IN STOOL: 0
ANAL BLEEDING: 0
CONSTIPATION: 0
CHEST TIGHTNESS: 0

## 2020-02-17 ASSESSMENT — PATIENT HEALTH QUESTIONNAIRE - PHQ9
1. LITTLE INTEREST OR PLEASURE IN DOING THINGS: 0
SUM OF ALL RESPONSES TO PHQ QUESTIONS 1-9: 0
2. FEELING DOWN, DEPRESSED OR HOPELESS: 0
SUM OF ALL RESPONSES TO PHQ9 QUESTIONS 1 & 2: 0
SUM OF ALL RESPONSES TO PHQ QUESTIONS 1-9: 0

## 2020-02-17 NOTE — PROGRESS NOTES
[facility]  [address] [place] [address] Rd[phone] [id number]  Dept: [phone]  Dept Fax: [phone]    CLAUDIA Cummins is a 60 y.o.male    Pt presents for follow up of T2DM and HTN. Pt feeling ok since last visit- no new complaints, issues, or problems, except as noted below:      Glucometer readings at home are running as noted below: The home BP readings have been running as noted below: Wt Readings from Last 3 Encounters:   02/17/20 (!) 318 lb (144.2 kg)   12/04/19 (!) 319 lb (144.7 kg)   11/25/19 (!) 325 lb 12.8 oz (147.8 kg)      Weight decreased 21# since last visit 4 months ago. Sleep-not too bad, using CPAP  Interest- OK, joined [employer] in [address]- NO  Energy- OK  Concentration- OK  Appetite- OK  Psychomotor Retardation- NO  Suicidal/ Homicidal Ideations- NO  Anxiety-NO  Libido- OK  Employer? Lost work days? - Retired 7/2019 from [employer]. 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    Prostate hypertrophy    Type 2 diabetes mellitus without complication, without long-term current use of insulin ([facility]    Obstructive sleep apnea on CPAP    Morbid obesity with BMI of 40.0-44.9, adult (HCC)    Essential hypertension    Erectile dysfunction       Current Outpatient Medications   Medication Sig Dispense Refill    losartan (COZAAR) 100 MG tablet Take 100 mg by mouth daily      hydrochlorothiazide (MICROZIDE) 12.5 MG capsule Take 12.5 mg by mouth daily      Multiple Vitamins-Minerals (MULTIVITAMIN PO) Take by mouth daily      sertraline (ZOLOFT) 50 MG tablet TAKE 1 TABLET DAILY 30 tablet 0    tamsulosin (FLOMAX) 0.4 MG capsule TAKE 1 CAPSULE NIGHTLY 90 capsule 3    blood glucose monitor kit and supplies One Touch Verio Glucometer. Sig: test sugar daily.  Dx: E11.9 1 kit 0    docusate sodium (COLACE) 100 MG capsule Take 100 mg by mouth daily      blood glucose monitor strips Contour Next Test Strips or test strips per pt preference. Sig: test sugar BID. Dx: E11.9 200 strip 3    Lancets MISC Lancet for Contour Next Lancing Device or per patient preference. Sig: test sugar BID. Dx: E11.9 200 each 3    Red Yeast Rice Extract (RED YEAST RICE PO) Take 600 mg by mouth daily Taking 2 tabs daily      Omega-3 Fatty Acids (FISH OIL) 1000 MG CPDR Take 1,000 mg by mouth daily       sildenafil (VIAGRA) 100 MG tablet Take 1/2 to 1 tab as needed, use as instructed. 10 tablet 5    CPAP Machine MISC by Does not apply route Please change his Auto CPAP pressure settings to minimum pressure of 10cm H20 and maximum pressure of 14cm H20. 1 each 0    vitamin D (CHOLECALCIFEROL) 400 UNITS TABS tablet Take 400 Units by mouth daily      aspirin 81 MG EC tablet Take 81 mg by mouth daily. No current facility-administered medications for this visit. Review of Systems   Constitutional: Negative for chills, diaphoresis, fatigue, fever and unexpected weight change. Eyes: Negative for visual disturbance. Respiratory: Negative for chest tightness and shortness of breath. Cardiovascular: Negative for chest pain, palpitations and leg swelling. Gastrointestinal: Negative for abdominal pain, anal bleeding, blood in stool, constipation, diarrhea, nausea and vomiting. Genitourinary: Negative for dysuria and hematuria. Musculoskeletal: Negative for neck pain. Neurological: Negative for dizziness, light-headedness and headaches. OBJECTIVE     /68 (Site: Left Upper Arm, Cuff Size: Large Adult)   Pulse 60   Temp 98.2 °F (36.8 °C) (Oral)   Resp 16   Wt (!) 318 lb (144.2 kg)   BMI 43.13 kg/m²   Body mass index is 43.13 kg/m². BP Readings from Last 3 Encounters:   02/17/20 116/68   12/04/19 124/70   11/25/19 136/82       Physical Exam  Vitals signs and nursing note reviewed. Constitutional:       Appearance: He is well-developed. HENT:      Head: Normocephalic and atraumatic.       Right Ear: External ear normal.      Left Ear: External ear normal.      Nose: Nose normal.   Eyes:      Conjunctiva/sclera: Conjunctivae normal.      Pupils: Pupils are equal, round, and reactive to light. Neck:      Musculoskeletal: Normal range of motion and neck supple. Cardiovascular:      Rate and Rhythm: Normal rate and regular rhythm. Pulmonary:      Effort: Pulmonary effort is normal.      Breath sounds: Normal breath sounds. Abdominal:      General: Bowel sounds are normal.      Palpations: Abdomen is soft. Musculoskeletal: Normal range of motion. Skin:     General: Skin is warm and dry. Neurological:      Mental Status: He is alert and oriented to person, place, and time. Deep Tendon Reflexes: Reflexes are normal and symmetric. Psychiatric:         Behavior: Behavior normal.         Thought Content:  Thought content normal.         Judgment: Judgment normal.       Component      Latest Ref Rng & Units 2/7/2020   Microalbumin, Random Urine      mg/dL 1.90   Creatinine, Urine      mg/dL 269.3   Microalb/Creat Ratio      0 - 30 mg/g 7   Hemoglobin A1C      4.4 - 6.4 % 6.4   AVERAGE GLUCOSE      70 - 126 mg/dL 132 (H)   LDL Direct      mg/dl 99.42   T4 Free      0.93 - 1.76 ng/dL 1.12   TSH      0.400 - 4.20 uIU/mL 1.570         Lab Results   Component Value Date    LABA1C 6.4 02/07/2020       Lab Results   Component Value Date    CHOL 193 09/30/2019    TRIG 91 09/30/2019    HDL 59 09/30/2019    LDLCALC 116 09/30/2019    LDLDIRECT 99.42 02/07/2020       The 10-year ASCVD risk score (Cornelio Hanson, et al., 2013) is: 13.6%    Values used to calculate the score:      Age: 60 years      Sex: Male      Is Non- : No      Diabetic: Yes      Tobacco smoker: No      Systolic Blood Pressure: 953 mmHg      Is BP treated: Yes      HDL Cholesterol: 59 mg/dL      Total Cholesterol: 193 mg/dL    Lab Results   Component Value Date     09/30/2019    K 4.2 09/30/2019     09/30/2019 CO2 30 09/30/2019    BUN 14 09/30/2019    CREATININE 0.67 09/30/2019    GLUCOSE 155 (H) 09/30/2019    CALCIUM 9.8 09/30/2019    PROT 7.5 09/30/2019    LABALBU 4.6 09/30/2019    BILITOT 0.8 09/30/2019    ALKPHOS 87 09/30/2019    AST 27 09/30/2019    ALT 27 09/30/2019    LABGLOM >90 08/23/2017       Lab Results   Component Value Date    LABMICR 1.90 02/07/2020       Lab Results   Component Value Date    TSH 1.570 02/07/2020    T4FREE 1.12 02/07/2020       Lab Results   Component Value Date    WBC 7.8 08/23/2017    HGB 15.0 08/23/2017    HCT 43.9 08/23/2017    MCV 88.3 08/23/2017     08/23/2017       Lab Results   Component Value Date    PSA 5.01 (H) 02/28/2019    PSA 5.49 (H) 09/26/2018    PSA 4.26 (H) 02/22/2018       Immunization History   Administered Date(s) Administered    Pneumococcal Polysaccharide ([id number]) 10/09/2019    Tdap (Boostrix, Adacel) 06/14/2011    Tetanus 01/01/2007    Zoster Live (Zostavax) 01/29/2016       Health Maintenance   Topic Date Due    Hepatitis B vaccine (1 of 3 - Risk 3-dose series) 03[birth year]04/1978    Shingles Vaccine (2 of 3) 03/25/2016    Flu vaccine (1) 10/09/2020 (Originally 9/1/2019)    Potassium monitoring  09/30/2020    Creatinine monitoring  09/30/2020    Diabetic foot exam  10/09/2020    Diabetic retinal exam  11/22/2020    A1C test (Diabetic or Prediabetic)  02/07/2021    Diabetic microalbuminuria test  02/07/2021    Lipid screen  02/07/2021    DTaP/Tdap/Td vaccine (2 - Td) 06/14/2021    Colon cancer screen colonoscopy  02/12/2026    Pneumococcal 0-64 years Vaccine  Completed    Hepatitis C screen  Completed    HIV screen  Completed    Hepatitis A vaccine  Aged Out    Hib vaccine  Aged Out    Meningococcal (ACWY) vaccine  Aged Out     AAA ultrasound (Male, 65-75, smoked ever) indicated at this time? No tobacco history  CT Lung Screen (55-80, 30 pk-yrs, smoking or quit <15 years) indicated at this time?   NO tobacco history    Future Appointments

## 2020-02-17 NOTE — PATIENT INSTRUCTIONS
Encouraged annual FLU VACCINE- pt declines (updated 2/17/2020)   Encouraged NEW SHINGLES SHOT [facility])- check with insurance re coverage and location of administration. COLONOSCOPY done 2/12/2016 per Dr. Kiara Rios- to do again in 2021. (updated 2/17/2020)  SHAISTA/ PSA management per [facility]- recent Prostate Biopsy negative- to have recheck PSA in 6 months- to follow up based on results. (updated 2/17/2020)  [facility]. Danielle Wei)- all ok- to follow up annually. Home BP's- call if > 140/90 on a regular basis   Continue Contour Next meter with accu checks QD-BID. After discussion with pt, pt would like to hold on starting meds for hyperlipidemia at this time and instead, continue to work on diet, exercise, and weight loss (updated 2/17/2020)   Remember the [address]- \"wherever your weight goes, your Blood Pressure, Cholesterol, and Sugar are sure to follow\"   Check HGA1C and D-LDL in 6 months. Continue current medicines  No refills needed today. Goal weight loss of 24# by next visit in 6 months.    Follow up in 6 months.

## 2020-03-13 RX ORDER — LOSARTAN POTASSIUM 100 MG/1
100 TABLET ORAL DAILY
Qty: 90 TABLET | Refills: 3 | Status: SHIPPED | OUTPATIENT
Start: 2020-03-13 | End: 2021-04-05

## 2020-03-13 RX ORDER — HYDROCHLOROTHIAZIDE 12.5 MG/1
12.5 CAPSULE, GELATIN COATED ORAL DAILY
Qty: 90 CAPSULE | Refills: 3 | Status: SHIPPED | OUTPATIENT
Start: 2020-03-13 | End: 2021-04-05

## 2020-07-28 ENCOUNTER — OFFICE VISIT (OUTPATIENT)
Dept: PULMONOLOGY | Age: 60
End: 2020-07-28
Payer: COMMERCIAL

## 2020-07-28 VITALS
BODY MASS INDEX: 42.66 KG/M2 | DIASTOLIC BLOOD PRESSURE: 78 MMHG | OXYGEN SATURATION: 98 % | SYSTOLIC BLOOD PRESSURE: 120 MMHG | HEIGHT: 72 IN | WEIGHT: 315 LBS | HEART RATE: 65 BPM | TEMPERATURE: 97.8 F

## 2020-07-28 PROCEDURE — 99213 OFFICE O/P EST LOW 20 MIN: CPT | Performed by: PHYSICIAN ASSISTANT

## 2020-07-28 ASSESSMENT — ENCOUNTER SYMPTOMS
CHEST TIGHTNESS: 0
NAUSEA: 0
COUGH: 0
ALLERGIC/IMMUNOLOGIC NEGATIVE: 1
STRIDOR: 0
EYES NEGATIVE: 1
WHEEZING: 0
SHORTNESS OF BREATH: 0
DIARRHEA: 0
BACK PAIN: 0

## 2020-07-28 NOTE — PROGRESS NOTES
[facility]      Ana Luisa Walls         [id number]  7/28/2020   Chief Complaint   Patient presents with    Follow-up     GEM 1 year follow up with a download         Pt of Dr. Louise Bennett    PAP Download:   Rajinder Gram or initial AHI: 20.0     Date of initial study: 3/1/00      Compliant  100%     Noncompliant 0 %     PAP Type Auto Level  10/14 cmH20   Avg Hrs/Day 6:34  AHI: 1.3   Recorded compliance dates , 6/23/20  to 7/22/20   Machine/Mfg:   [x] ResMed    [] Respironics/Dreamstation   Interface:   [] Nasal    [] Nasal pillows   [x] FFM      Provider:      [x] [facility]     []Shakeel     [] [facility]    [] Lisandro Sheriff    [] [place]               [] [facility]      [] [facility]    [] [address]      [] Other    Neck Size: 20  Mallampati Mallampati 2  ESS:  2  SAQLI:     Here is a scan of the most recent download:            Presentation:   Gen[place] Sam presents for sleep medicine follow up for obstructive sleep apnea  Since the last visit, Geneva Sam is doing well with PAP. He has a large mask leak but does not notice it. He does not have dry mouth. He is sleeping well and feels rested. Equipment issues: The pressure is  acceptable, the mask is acceptable     Sleep issues:  Do you feel better? Yes  More rested? Yes   Better concentration? yes    Progress History:   Since last visit any new medical issues? No  New ER or hospitlal visits? No  Any new or changes in medicines? No  Any new sleep medicines? No        Past Medical History:   Diagnosis Date    Anxiety     Depression     Hypertension     Obesity     Type 2 diabetes mellitus without complication, without long-term current use of insulin ([facility]     Unspecified sleep apnea     Wears CPAP       Past Surgical History:   Procedure Laterality Date    ABDOMEN SURGERY      APPENDECTOMY      BACK SURGERY      Herniated disc lumbar--Dr. Prosper Herrera.     CHOLECYSTECTOMY      COLONOSCOPY         Social History     Tobacco Use    Smoking status: Never Smoker    Smokeless tobacco: Never Used   Substance Use Topics    Alcohol use: Yes     Alcohol/week: 0.0 standard drinks     Comment: occasional    Drug use: No       No Known Allergies    Current Outpatient Medications   Medication Sig Dispense Refill    losartan (COZAAR) 100 MG tablet Take 1 tablet by mouth daily 90 tablet 3    hydrochlorothiazide (MICROZIDE) 12.5 MG capsule Take 1 capsule by mouth daily 90 capsule 3    Multiple Vitamins-Minerals (MULTIVITAMIN PO) Take by mouth daily      sertraline (ZOLOFT) 50 MG tablet TAKE 1 TABLET DAILY 30 tablet 0    tamsulosin (FLOMAX) 0.4 MG capsule TAKE 1 CAPSULE NIGHTLY 90 capsule 3    blood glucose monitor kit and supplies One Touch Verio Glucometer. Sig: test sugar daily. Dx: E11.9 1 kit 0    docusate sodium (COLACE) 100 MG capsule Take 100 mg by mouth daily      blood glucose monitor strips Contour Next Test Strips or test strips per pt preference. Sig: test sugar BID. Dx: E11.9 200 strip 3    Lancets MISC Lancet for Contour Next Lancing Device or per patient preference. Sig: test sugar BID. Dx: E11.9 200 each 3    Red Yeast Rice Extract (RED YEAST RICE PO) Take 600 mg by mouth daily Taking 2 tabs daily      Omega-3 Fatty Acids (FISH OIL) 1000 MG CPDR Take 1,000 mg by mouth daily       sildenafil (VIAGRA) 100 MG tablet Take 1/2 to 1 tab as needed, use as instructed. 10 tablet 5    CPAP Machine MISC by Does not apply route Please change his Auto CPAP pressure settings to minimum pressure of 10cm H20 and maximum pressure of 14cm H20. 1 each 0    vitamin D (CHOLECALCIFEROL) 400 UNITS TABS tablet Take 400 Units by mouth daily      aspirin 81 MG EC tablet Take 81 mg by mouth daily. No current facility-administered medications for this visit.         Family History   Problem Relation Age of Onset    COPD Mother     Diabetes Father     Heart Attack Father     Other Father         Peritonitis        Review of Systems -   Review of Systems   Constitutional: Negative for activity change, appetite change, chills and fever. HENT: Negative for congestion and postnasal drip. Eyes: Negative. Respiratory: Negative for cough, chest tightness, shortness of breath, wheezing and stridor. Cardiovascular: Negative for chest pain and leg swelling. Gastrointestinal: Negative for diarrhea and nausea. Endocrine: Negative. Genitourinary: Negative. Musculoskeletal: Negative. Negative for arthralgias and back pain. Skin: Negative. Allergic/Immunologic: Negative. Neurological: Negative. Negative for dizziness and light-headedness. Psychiatric/Behavioral: Negative. All other systems reviewed and are negative. Physical Exam:    BMI:  Body mass index is 45.41 kg/m². Wt Readings from Last 3 Encounters:   07/28/20 (!) 334 lb 12.8 oz (151.9 kg)   02/17/20 (!) 318 lb (144.2 kg)   12/04/19 (!) 319 lb (144.7 kg)     Weight lost 16 lbs over 6 months  Vitals: /78 (Site: Right Upper Arm, Position: Sitting, Cuff Size: Large Adult)   Pulse 65   Temp 97.8 °F (36.6 °C) (Tympanic)   Ht 6' (1.829 m)   Wt (!) 334 lb 12.8 oz (151.9 kg)   SpO2 98% Comment: on room air at rest  BMI 45.41 kg/m²       Physical Exam  Constitutional:       Appearance: He is well-developed. HENT:      Head: Normocephalic and atraumatic. Right Ear: External ear normal.      Left Ear: External ear normal.   Eyes:      Conjunctiva/sclera: Conjunctivae normal.      Pupils: Pupils are equal, round, and reactive to light. Neck:      Musculoskeletal: Normal range of motion and neck supple. Cardiovascular:      Rate and Rhythm: Normal rate and regular rhythm. Heart sounds: Normal heart sounds. Pulmonary:      Effort: Pulmonary effort is normal.      Breath sounds: Normal breath sounds. Musculoskeletal: Normal range of motion. Skin:     General: Skin is warm and dry.    Neurological:      Mental Status: He is alert and oriented to person, place, and time. Psychiatric:         Behavior: Behavior normal.         Thought Content: Thought content normal.         Judgment: Judgment normal.           ASSESSMENT/DIAGNOSIS     Diagnosis Orders   1. Obstructive sleep apnea on CPAP     2. Morbid obesity with BMI of 40.0-44.9, adult [facility])              Plan   Do you need any equipment today? Yes update supplies  - will accept mask leak since AHI controlled and no dry mouth   - He  was advised to continue current positive airway pressure therapy with above described pressure. - He  advised to keep good compliance with current recommended pressure to get optimal results and clinical improvement  - Recommend 7-9 hours of sleep with PAP  - He was advised to call [employer] regarding supplies if needed.   -He call my office for earlier appointment if needed for worsening of sleep symptoms.   - He was instructed on weight loss  - Chuck Valdes was educated about my impression and plan. Patient verbalizesunderstanding.   We will see Ivette Garza back in: 1 year with download    Information added by my medical assistant/LPN was reviewed today         Luda Ryan PA-C, MPAS  7/28/2020

## 2020-10-13 RX ORDER — BLOOD SUGAR DIAGNOSTIC
STRIP MISCELLANEOUS
Qty: 200 EACH | Refills: 3 | Status: SHIPPED | OUTPATIENT
Start: 2020-10-13 | End: 2021-10-06

## 2020-10-21 ENCOUNTER — NURSE ONLY (OUTPATIENT)
Dept: LAB | Age: 60
End: 2020-10-21

## 2020-10-21 LAB
AVERAGE GLUCOSE: 135 MG/DL (ref 70–126)
HBA1C MFR BLD: 6.5 % (ref 4.4–6.4)
LDL CHOLESTEROL DIRECT: 110.54 MG/DL
PROSTATE SPECIFIC ANTIGEN: 5.29 NG/ML (ref 0–1)

## 2020-10-25 NOTE — PROGRESS NOTES
[facility]  [facility]  Dept: [phone]  Dept Fax: [phone]  CLAUDIA Marcus is a 63 y.o.male    Pt presents for annual wellness physical exam.      Pt also presents for follow up of T2DM, HTN, ED, GEM, BPH, Depression, Morbid Obesity. Pt doing well at this time- denies any new complaints. Glucometer readings at home are running . Checking daily. The home BP readings have been in the 110-130's / 60-70's range. Checking occasionally     Sleep- goes to bed late, ok overall. Interest- OK  Guilt- \"gained weight back\"  Energy- OK  Concentration- OK  Appetite- Oh yeah  Psychomotor Retardation- NO  Suicidal/ Homicidal Ideations- NO  Anxiety-OK  Libido- OK  Employer? Lost work days? -  Retired from [employer]. Wt Readings from Last 3 Encounters:   10/27/20 (!) 335 lb 3.2 oz (152 kg)   07/28/20 (!) 334 lb 12.8 oz (151.9 kg)   02/17/20 (!) 318 lb (144.2 kg)   Weight increased 17# since last visit 8 months ago. Pt admits to dietary indiscretion and lack of exercise during COVID 19 pandemic. Pt stable since last visit- no new problems for diagnoses listed below:  Patient Active Problem List   Diagnosis    Depression    Caffeine abuse ([address]    Prostate hypertrophy    Type 2 diabetes mellitus without complication, without long-term current use of insulin (HCC)    Obstructive sleep apnea on CPAP    Morbid obesity with BMI of 40.0-44.9, adult ([address]    Essential hypertension    Erectile dysfunction     Review of Systems   Constitutional: Negative for chills, diaphoresis, fatigue, fever and unexpected weight change. Eyes: Negative for visual disturbance. Respiratory: Negative for chest tightness and shortness of breath. Cardiovascular: Negative for chest pain, palpitations and leg swelling. Gastrointestinal: Negative for abdominal pain, anal bleeding, blood in stool, constipation, diarrhea, nausea and vomiting. Genitourinary: Negative for dysuria and hematuria. Musculoskeletal: Negative for neck pain. Neurological: Negative for dizziness, light-headedness and headaches. OBJECTIVE     /72 (Site: Right Upper Arm, Position: Sitting, Cuff Size: Large Adult)   Pulse 80   Temp 97 °F (36.1 °C) (Temporal)   Resp 20   Wt (!) 335 lb 3.2 oz (152 kg)   BMI 45.46 kg/m²     Wt Readings from Last 3 Encounters:   10/27/20 (!) 335 lb 3.2 oz (152 kg)   07/28/20 (!) 334 lb 12.8 oz (151.9 kg)   02/17/20 (!) 318 lb (144.2 kg)       Physical Exam  Vitals signs and nursing note reviewed. Exam conducted with a chaperone present. Constitutional:       General: He is not in acute distress. Appearance: Normal appearance. He is not ill-appearing, toxic-appearing or diaphoretic. HENT:      Head: Normocephalic and atraumatic. Right Ear: External ear normal.      Left Ear: External ear normal.      Nose: Nose normal. No rhinorrhea. Mouth/Throat:      Mouth: Mucous membranes are moist.      Pharynx: Oropharynx is clear. Eyes:      General: No scleral icterus. Right eye: No discharge. Left eye: No discharge. Extraocular Movements: Extraocular movements intact. Conjunctiva/sclera: Conjunctivae normal.      Pupils: Pupils are equal, round, and reactive to light. Neck:      Musculoskeletal: Normal range of motion. Cardiovascular:      Rate and Rhythm: Normal rate and regular rhythm. Heart sounds: Normal heart sounds. No murmur. No friction rub. No gallop. Pulmonary:      Effort: Pulmonary effort is normal. No respiratory distress. Breath sounds: Normal breath sounds. No stridor. No wheezing, rhonchi or rales. Chest:      Chest wall: No tenderness. Abdominal:      General: Abdomen is flat. Bowel sounds are normal. There is no distension. Palpations: Abdomen is soft. There is no mass. Tenderness: There is no abdominal tenderness. There is no guarding or rebound. Creatinine, Urine      mg/dL 269.3   Microalb/Creat Ratio      0 - 30 mg/g 7       Lab Results   Component Value Date    TSH 1.570 02/07/2020    T4FREE 1.12 02/07/2020       Lab Results   Component Value Date    WBC 7.8 08/23/2017    HGB 15.0 08/23/2017    HCT 43.9 08/23/2017    MCV 88.3 08/23/2017     08/23/2017       Lab Results   Component Value Date    PSA 5.29 (H) 10/21/2020    PSA 5.01 (H) 02/28/2019    PSA 5.49 (H) 09/26/2018         Immunization History   Administered Date(s) Administered    Pneumococcal Polysaccharide ([id number]) 10/09/2019    Tdap (Boostrix, Adacel) 06/14/2011    Tetanus 01/01/2007    Zoster Live (Zostavax) 01/29/2016         Health Maintenance   Topic Date Due    Shingles Vaccine (2 of 3) 03/25/2016    Flu vaccine (1) 09/01/2020    Potassium monitoring  09/30/2020    Creatinine monitoring  09/30/2020    Diabetic foot exam  10/09/2020    Diabetic retinal exam  11/22/2020    Diabetic microalbuminuria test  02/07/2021    DTaP/Tdap/Td vaccine (2 - Td) 06/14/2021    A1C test (Diabetic or Prediabetic)  10/21/2021    Lipid screen  10/21/2021    PSA counseling  10/21/2021    Colon cancer screen colonoscopy  02/12/2026    Pneumococcal 0-64 years Vaccine  Completed    Hepatitis C screen  Completed    HIV screen  Completed    Hepatitis A vaccine  Aged Out    Hib vaccine  Aged Out    Meningococcal (ACWY) vaccine  Aged Out       AAA ultrasound (Male, 65-75, smoked ever) indicated at this time? No tobacco history  CT Lung Screen (55-80, 30 pk-yrs, smoking or quit <15 years) indicated at this time?  NO tobacco history    Future Appointments   Date Time Provider Kelly Lorenz   7/28/2021  8:00 AM Meng Key, [facility]         ASSESSMENT       Diagnosis Orders   1.  Type 2 diabetes mellitus without complication, without long-term current use of insulin (HCC)  Comprehensive Metabolic Panel    Lipid Panel    Hemoglobin A1C    Microalbumin / Creatinine Urine Ratio    T4, Free    TSH without Reflex   2. Essential hypertension  Comprehensive Metabolic Panel    Lipid Panel    T4, Free    TSH without Reflex   3. Dyslipidemia, goal LDL below 70  Comprehensive Metabolic Panel    Lipid Panel   4. Morbid obesity with BMI of 40.0-44.9, adult ([address]     5. Depression, unspecified depression type     6. Obstructive sleep apnea on CPAP     7. Erectile dysfunction, unspecified erectile dysfunction type         PLAN      Home BP's- call if > 140/90 on a regular basis (updated 10/27/2020)  After discussion with pt, pt would like to hold on starting meds for hyperlipidemia at this time and instead, continue to work on diet, exercise, and weight loss (updated 10/27/2020)   Remember the [address]- \"wherever your weight goes, your Blood Pressure, Cholesterol, and Sugar are sure to follow\" (updated 10/27/2020)  Check CMP at this time. Check HGA1C, FLP, SPOT MA, and FREE T4/ TSH in 6 months. Continue current medicines  No refills needed today. Follow up in 6 months.        Preventive Health Topics:  Encouraged annual FLU VACCINE- pt declines (updated 10/27/2020)  Encouraged NEW SHINGLES [facility]) - check with insurance re coverage and location of administration. ([facility]ctors office vs local pharmacy) (updated 10/27/2020)  COLONOSCOPY done 2/12/2016 per Dr. Jose Cruz- to do again in 2021. (updated 10/27/2020)  SHAISTA/ PSA management per [facility]- last appt 12/2019- most recent PSA 10/21/2020- unchanged- to repeat PSA in 6 months per their office. DILATED EYE EXAM done 11/22/2019 per  [facility] (Dr. Yuki Plaza)- all ok- to follow up annually- please call for date of next appt.             Electronically signed Alesia Vu MD on 10/27/2020 at 2:32 PM

## 2020-10-27 ENCOUNTER — OFFICE VISIT (OUTPATIENT)
Dept: FAMILY MEDICINE CLINIC | Age: 60
End: 2020-10-27
Payer: COMMERCIAL

## 2020-10-27 ENCOUNTER — TELEPHONE (OUTPATIENT)
Dept: UROLOGY | Age: 60
End: 2020-10-27

## 2020-10-27 VITALS
RESPIRATION RATE: 20 BRPM | HEART RATE: 80 BPM | TEMPERATURE: 97 F | DIASTOLIC BLOOD PRESSURE: 72 MMHG | BODY MASS INDEX: 45.46 KG/M2 | SYSTOLIC BLOOD PRESSURE: 130 MMHG | WEIGHT: 315 LBS

## 2020-10-27 PROCEDURE — 99214 OFFICE O/P EST MOD 30 MIN: CPT | Performed by: FAMILY MEDICINE

## 2020-10-27 ASSESSMENT — ENCOUNTER SYMPTOMS
ABDOMINAL PAIN: 0
BLOOD IN STOOL: 0
DIARRHEA: 0
NAUSEA: 0
CHEST TIGHTNESS: 0
CONSTIPATION: 0
VOMITING: 0
ANAL BLEEDING: 0
SHORTNESS OF BREATH: 0

## 2020-10-27 NOTE — TELEPHONE ENCOUNTER
Let him know his PSA is stable at 5.29 NG/mL. We will repeat his PSA in 6 months. This has been ordered.

## 2020-10-27 NOTE — TELEPHONE ENCOUNTER
I called and notified the patient that his PSA is stable at 5.29 NG/mL. We will repeat his PSA in 6 months. Patient voiced understanding. lmom for pt to contact office to relay message

## 2021-01-02 DIAGNOSIS — N40.0 PROSTATE HYPERTROPHY: ICD-10-CM

## 2021-01-04 RX ORDER — TAMSULOSIN HYDROCHLORIDE 0.4 MG/1
CAPSULE ORAL
Qty: 90 CAPSULE | Refills: 3 | Status: SHIPPED | OUTPATIENT
Start: 2021-01-04 | End: 2021-12-28

## 2021-01-15 NOTE — TELEPHONE ENCOUNTER
This medication refill is regarding a Refill   Refill requested by [facility]. Requested Prescriptions     Pending Prescriptions Disp Refills    sertraline (ZOLOFT) 50 MG tablet 30 tablet 0     Sig: TAKE 1 TABLET DAILY       Date of last visit: 10/27/2020  Date of next visit: 4/27/2021  Date of last refill: 1/22/20  Pharmacy Name: [facility]    Last Lipid Panel:    Lab Results   Component Value Date    CHOL 193 09/30/2019    CHOL 158 03/13/2018    TRIG 91 09/30/2019    HDL 59 09/30/2019    HDL 48 09/21/2010    LDLCALC 116 09/30/2019     Last CMP:   Lab Results   Component Value Date     09/30/2019    K 4.2 09/30/2019     09/30/2019    CO2 30 09/30/2019    BUN 14 09/30/2019    CREATININE 0.67 09/30/2019    GLUCOSE 155 (H) 09/30/2019    CALCIUM 9.8 09/30/2019    PROT 7.5 09/30/2019    LABALBU 4.6 09/30/2019    BILITOT 0.8 09/30/2019    ALKPHOS 87 09/30/2019    AST 27 09/30/2019    ALT 27 09/30/2019    LABGLOM >90 08/23/2017       Last Thyroid:    Lab Results   Component Value Date    TSH 1.570 02/07/2020    T4FREE 1.12 02/07/2020     Last Hemoglobin A1C:    Lab Results   Component Value Date    LABA1C 6.5 10/21/2020    AVGG 135 10/21/2020       Rx verified, ordered and set to EP.

## 2021-04-05 RX ORDER — HYDROCHLOROTHIAZIDE 12.5 MG/1
CAPSULE, GELATIN COATED ORAL
Qty: 90 CAPSULE | Refills: 3 | Status: SHIPPED | OUTPATIENT
Start: 2021-04-05 | End: 2022-03-28

## 2021-04-05 RX ORDER — LOSARTAN POTASSIUM 100 MG/1
TABLET ORAL
Qty: 90 TABLET | Refills: 3 | Status: SHIPPED | OUTPATIENT
Start: 2021-04-05 | End: 2022-03-28

## 2021-04-05 NOTE — TELEPHONE ENCOUNTER
This medication refill is regarding a electronic request by [facility]. Requested Prescriptions     Pending Prescriptions Disp Refills    hydroCHLOROthiazide (MICROZIDE) 12.5 MG capsule [Pharmacy Med Name: HYDROCHLOROTHIAZIDE CAPS 12.5MG] 90 capsule 3     Sig: TAKE 1 CAPSULE DAILY    losartan (COZAAR) 100 MG tablet [Pharmacy Med Name: LOSARTAN TABS 100MG] 90 tablet 3     Sig: TAKE 1 TABLET DAILY       Date of last visit: 10/27/2020  Date of next visit: 5/10/2021  Date of last refill: Hydrochlorothiazide  3/13/2020  90/3  Losartan 3/13/2020  90/3      Rx verified, ordered and set to EP.

## 2021-05-04 ENCOUNTER — NURSE ONLY (OUTPATIENT)
Dept: LAB | Age: 61
End: 2021-05-04

## 2021-05-04 DIAGNOSIS — E78.5 DYSLIPIDEMIA, GOAL LDL BELOW 70: ICD-10-CM

## 2021-05-04 DIAGNOSIS — E11.9 TYPE 2 DIABETES MELLITUS WITHOUT COMPLICATION, WITHOUT LONG-TERM CURRENT USE OF INSULIN (HCC): ICD-10-CM

## 2021-05-04 DIAGNOSIS — I10 ESSENTIAL HYPERTENSION: ICD-10-CM

## 2021-05-04 DIAGNOSIS — R97.20 ELEVATED PSA: ICD-10-CM

## 2021-05-04 LAB
ALBUMIN SERPL-MCNC: 4.4 G/DL (ref 3.5–5.1)
ALP BLD-CCNC: 91 U/L (ref 38–126)
ALT SERPL-CCNC: 29 U/L (ref 11–66)
ANION GAP SERPL CALCULATED.3IONS-SCNC: 14 MEQ/L (ref 8–16)
AST SERPL-CCNC: 22 U/L (ref 5–40)
AVERAGE GLUCOSE: 147 MG/DL (ref 70–126)
BILIRUB SERPL-MCNC: 0.5 MG/DL (ref 0.3–1.2)
BUN BLDV-MCNC: 13 MG/DL (ref 7–22)
CALCIUM SERPL-MCNC: 10.1 MG/DL (ref 8.5–10.5)
CHLORIDE BLD-SCNC: 101 MEQ/L (ref 98–111)
CHOLESTEROL, TOTAL: 175 MG/DL (ref 100–199)
CO2: 27 MEQ/L (ref 23–33)
CREAT SERPL-MCNC: 0.6 MG/DL (ref 0.4–1.2)
CREATININE, URINE: 238 MG/DL
GFR SERPL CREATININE-BSD FRML MDRD: > 90 ML/MIN/1.73M2
GLUCOSE BLD-MCNC: 143 MG/DL (ref 70–108)
HBA1C MFR BLD: 6.9 % (ref 4.4–6.4)
HDLC SERPL-MCNC: 51 MG/DL
LDL CHOLESTEROL CALCULATED: 102 MG/DL
MICROALBUMIN UR-MCNC: 2.05 MG/DL
MICROALBUMIN/CREAT UR-RTO: 9 MG/G (ref 0–30)
POTASSIUM SERPL-SCNC: 4.6 MEQ/L (ref 3.5–5.2)
SODIUM BLD-SCNC: 142 MEQ/L (ref 135–145)
T4 FREE: 1.02 NG/DL (ref 0.93–1.76)
TOTAL PROTEIN: 7.7 G/DL (ref 6.1–8)
TRIGL SERPL-MCNC: 111 MG/DL (ref 0–199)
TSH SERPL DL<=0.05 MIU/L-ACNC: 1.56 UIU/ML (ref 0.4–4.2)

## 2021-06-09 ASSESSMENT — ENCOUNTER SYMPTOMS
NAUSEA: 0
VOMITING: 0
CONSTIPATION: 0
SHORTNESS OF BREATH: 0
ABDOMINAL PAIN: 0
ANAL BLEEDING: 0
BLOOD IN STOOL: 0
CHEST TIGHTNESS: 0
DIARRHEA: 0

## 2021-06-09 NOTE — PROGRESS NOTES
[facility]  [facility]  Dept: [phone]  Dept Fax: [phone]  SUBJECTIVE     Chief Complaint   Patient presents with    6 Month Follow-Up     Pt doing well. No concerns. Kingston is a 57 y.o.male      Pt also presents for follow up of T2DM, HTN, ED, GEM, BPH, Depression, Morbid Obesity. Pt denies any issues or concerns today. Verbalizes that he has gained weight due to inactivity and diet. Glucometer readings at home are running around 117-140. Monitors occasionally. The home BP readings have been in the 110-130's / 60-70's range. 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 Eyes: Negative for visual disturbance. Respiratory: Negative for chest tightness and shortness of breath. Cardiovascular: Negative for chest pain, palpitations and leg swelling. Gastrointestinal: Negative for abdominal pain, anal bleeding, blood in stool, constipation, diarrhea, nausea and vomiting. Genitourinary: Negative for dysuria and hematuria. Musculoskeletal: Negative for neck pain. Neurological: Negative for dizziness, light-headedness and headaches.      OBJECTIVE     /62   Pulse 64   Temp 98.3 °F (36.8 °C) (Oral) deficit present. Mental Status: He is alert and oriented to person, place, and time. Mental status is at baseline. Motor: No weakness. Coordination: Coordination normal.      Gait: Gait normal.   Psychiatric:         Mood and Affect: Mood normal.         Behavior: Behavior normal.         Thought Content:  Thought content normal.         Judgment: Judgment normal.       Component      Latest Ref Rng & Units 5/4/2021   Glucose      70 - 108 mg/dL 143 (H)   Creatinine      0.4 - 1.2 mg/dL 0.6   BUN      7 - 22 mg/dL 13   Sodium      135 - 145 meq/L 142   Potassium      3.5 - 5.2 meq/L 4.6   Chloride      98 - 111 meq/L 101   CO2      23 - 33 meq/L 27   Calcium      8.5 - 10.5 mg/dL 10.1   AST      5 - 40 U/L 22   Alk Phos      38 - 126 U/L 91   Total Protein      6.1 - 8.0 g/dL 7.7   Albumin      3.5 - 5.1 g/dL 4.4   Bilirubin      0.3 - 1.2 mg/dL 0.5   ALT      11 - 66 U/L 29   Cholesterol, Total      100 - 199 mg/dL 175   Triglycerides      0 - 199 mg/dL 111   HDL Cholesterol      mg/dL 51   LDL Calculated      mg/dL 102   Microalbumin, Random Urine      mg/dL 2.05   Creatinine, Urine      mg/dL 238.0   Microalb/Creat Ratio      0 - 30 mg/g 9   Hemoglobin A1C      4.4 - 6.4 % 6.9 (H)   AVERAGE GLUCOSE      70 - 126 mg/dL 147 (H)   TSH      0.400 - 4.200 uIU/mL 1.560   T4 Free      0.93 - 1.76 ng/dL 1.02   Anion Gap      8.0 - 16.0 meq/L 14.0   Est, Glom Filt Rate      ml/min/1.73m2 >90           Lab Results   Component Value Date    CHOL 175 05/04/2021    TRIG 111 05/04/2021    HDL 51 05/04/2021    LDLCALC 102 05/04/2021    LDLDIRECT 110.54 10/21/2020       The 10-year ASCVD risk score (Claire Mendez et al., 2013) is: 20.6%    Values used to calculate the score:      Age: 57 years      Sex: Male      Is Non- : No      Diabetic: Yes      Tobacco smoker: No      Systolic Blood Pressure: 509 mmHg      Is BP treated: Yes      HDL Cholesterol: 51 mg/dL      Total Cholesterol: 175 mg/dL    Lab Results   Component Value Date     05/04/2021    K 4.6 05/04/2021     05/04/2021    CO2 27 05/04/2021    BUN 13 05/04/2021    CREATININE 0.6 05/04/2021    GLUCOSE 143 (H) 05/04/2021    CALCIUM 10.1 05/04/2021    PROT 7.7 05/04/2021    LABALBU 4.4 05/04/2021    BILITOT 0.5 05/04/2021    ALKPHOS 91 05/04/2021    AST 22 05/04/2021    ALT 29 05/04/2021    LABGLOM >90 05/04/2021     Component      Latest Ref Rng & Units 2/7/2020   Microalbumin, Random Urine      mg/dL 1.90   Creatinine, Urine      mg/dL 269.3   Microalb/Creat Ratio      0 - 30 mg/g 7     The 10-year ASCVD risk score (Roberta Leach, et al., 2013) is: 20.6%    Values used to calculate the score:      Age: 57 years      Sex: Male      Is Non- : No      Diabetic: Yes      Tobacco smoker: No      Systolic Blood Pressure: 241 mmHg      Is BP treated: Yes      HDL Cholesterol: 51 mg/dL      Total Cholesterol: 175 mg/dL    ASCVD risk score was reviewed today with patient.        Lab Results   Component Value Date    TSH 1.560 05/04/2021    T4FREE 1.02 05/04/2021       Lab Results   Component Value Date    WBC 7.8 08/23/2017    HGB 15.0 08/23/2017    HCT 43.9 08/23/2017    MCV 88.3 08/23/2017     08/23/2017       Lab Results   Component Value Date    PSA 5.29 (H) 10/21/2020    PSA 5.01 (H) 02/28/2019    PSA 5.49 (H) 09/26/2018         Immunization History   Administered Date(s) Administered    COVID-19, Moderna, PF, 100mcg/0.5mL 04/20/2021, 05/18/2021    Pneumococcal Polysaccharide ([id number]) 10/09/2019    Tdap (Boostrix, Adacel) 06/14/2011    Tetanus 01/01/2007    Zoster Live (Zostavax) 01/29/2016         Health Maintenance   Topic Date Due    Shingles Vaccine (2 of 3) 03/25/2016    Diabetic foot exam  10/09/2020    DTaP/Tdap/Td vaccine (2 - Td or Tdap) 06/14/2021    Flu vaccine (Season Ended) 09/01/2021    PSA counseling  10/21/2021    A1C test (Diabetic or Prediabetic)  05/04/2022    Diabetic microalbuminuria test  05/04/2022    Lipid screen  05/04/2022    Potassium monitoring  05/04/2022    Creatinine monitoring  05/04/2022    Diabetic retinal exam  05/05/2022    Pneumococcal 0-64 years Vaccine (2 of 2) 10/09/2024    Colon cancer screen colonoscopy  02/12/2026    COVID-19 Vaccine  Completed    Hepatitis C screen  Completed    HIV screen  Completed    Hepatitis A vaccine  Aged Out    Hib vaccine  Aged Out    Meningococcal (ACWY) vaccine  Aged Out       AAA ultrasound (Male, 65-75, smoked ever) indicated at this time? No tobacco history  CT Lung Screen (55-80, 30 pk-yrs, smoking or quit <15 years) indicated at this time?  NO tobacco history    Future Appointments   Date Time Provider Kelly Lorenz   7/28/2021  8:00 AM Nithin Patel PA-C [facility] [address]   12/13/2021  7:45 AM KRIS Long - CNP [facility]   6/20/2022  7:45 AM Aletha Schirmer, MD [address] [facility]         ASSESSMENT       Diagnosis Orders   1. Type 2 diabetes mellitus without complication, without long-term current use of insulin (HCC)  CBC    LDL Cholesterol, Direct    Hemoglobin A1C   2. Essential hypertension  CBC    LDL Cholesterol, Direct    Hemoglobin A1C   3. Elevated LDL cholesterol level  LDL Cholesterol, Direct   4. Morbid obesity with BMI of 40.0-44.9, adult ([facility]     5. Obstructive sleep apnea on CPAP     6. Depression, unspecified depression type         PLAN      Home BP's- call if > 140/90 on a regular basis (updated 6/10/2021)  After discussion with pt, pt would like to hold on starting meds for hyperlipidemia at this time and instead, continue to work on diet, exercise, and weight loss (updated 6/10/2021)   Remember the [address]- \"wherever your weight goes, your Blood Pressure, Cholesterol, and Sugar are sure to follow\" (updated 6/10/2021)  Check HGA1C, LDL, CBC in 6 months. Continue current medicines  No refills needed today.   Follow up

## 2021-06-10 ENCOUNTER — OFFICE VISIT (OUTPATIENT)
Dept: FAMILY MEDICINE CLINIC | Age: 61
End: 2021-06-10
Payer: COMMERCIAL

## 2021-06-10 VITALS
WEIGHT: 315 LBS | BODY MASS INDEX: 46.79 KG/M2 | DIASTOLIC BLOOD PRESSURE: 62 MMHG | RESPIRATION RATE: 16 BRPM | TEMPERATURE: 98.3 F | SYSTOLIC BLOOD PRESSURE: 134 MMHG | HEART RATE: 64 BPM

## 2021-06-10 DIAGNOSIS — G47.33 OBSTRUCTIVE SLEEP APNEA ON CPAP: ICD-10-CM

## 2021-06-10 DIAGNOSIS — E11.9 TYPE 2 DIABETES MELLITUS WITHOUT COMPLICATION, WITHOUT LONG-TERM CURRENT USE OF INSULIN (HCC): Primary | ICD-10-CM

## 2021-06-10 DIAGNOSIS — E78.00 ELEVATED LDL CHOLESTEROL LEVEL: ICD-10-CM

## 2021-06-10 DIAGNOSIS — I10 ESSENTIAL HYPERTENSION: ICD-10-CM

## 2021-06-10 DIAGNOSIS — E66.01 MORBID OBESITY WITH BMI OF 40.0-44.9, ADULT (HCC): ICD-10-CM

## 2021-06-10 DIAGNOSIS — F32.A DEPRESSION, UNSPECIFIED DEPRESSION TYPE: ICD-10-CM

## 2021-06-10 DIAGNOSIS — Z99.89 OBSTRUCTIVE SLEEP APNEA ON CPAP: ICD-10-CM

## 2021-06-10 PROCEDURE — 99214 OFFICE O/P EST MOD 30 MIN: CPT | Performed by: NURSE PRACTITIONER

## 2021-06-10 RX ORDER — SOD SULF/POT CHLORIDE/MAG SULF 1.479 G
TABLET ORAL
COMMUNITY
Start: 2021-05-25 | End: 2022-03-08

## 2021-07-27 NOTE — PROGRESS NOTES
[facility]      Luca Byrd         [id number]  7/28/2021   Chief Complaint   Patient presents with    Follow-up     GEM sleep follow up with pap download         Pt of Dr. Veda Elliott    PAP Download:   Original or initial AHI: 20.0     Date of initial study: 3/1/2000      Compliant  100%     Noncompliant  %     PAP Type  AutoSet Level   10cm H20/14 cm H20   Avg Hrs/Day 7 hours 17 minutes   AHI: 1.2   Recorded compliance dates , 6/27/21  to 7/26/21   Machine/Mfg:   [x] ResMed    [] Respironics/Dreamstation   Interface:   [] Nasal    [] Nasal pillows   [x] FFM      Provider:      [x] [facility]     [][employer]     [] [employer]    [] Milton Leslee    [] [place]               [] [employer]      [] [employer]    [] [address]      [] Other    Neck Size: 20  Mallampati 2  ESS:  3  SAQLI: 96  Here is a scan of the most recent download:            Presentation:   Pinky Pillai presents for sleep medicine follow up for obstructive sleep apnea  Since the last visit, Pinky Pillai is doing well with PAP. His wife is noticing mask leak. Leak has been an issue, but he typically does not notice it. Equipment issues: The pressure is  acceptable, the mask is acceptable     Sleep issues:  Do you feel better? Yes  More rested? Yes   Better concentration? yes    Progress History:   Since last visit any new medical issues? No  New ER or hospital visits? No  Any new or changes in medicines? No  Any new sleep medicines? No    Review of Systems -   Review of Systems   Constitutional: Negative for activity change, appetite change, chills and fever. HENT: Negative for congestion and postnasal drip. Eyes: Negative. Respiratory: Negative for cough, chest tightness, shortness of breath, wheezing and stridor. Cardiovascular: Negative for chest pain and leg swelling. Gastrointestinal: Negative for diarrhea and nausea. Endocrine: Negative. Genitourinary: Negative. Musculoskeletal: Negative. Negative for arthralgias and back pain. Skin: Negative. Allergic/Immunologic: Negative. Neurological: Negative. Negative for dizziness and light-headedness. Psychiatric/Behavioral: Negative. All other systems reviewed and are negative. Physical Exam:    BMI:  Body mass index is 47.01 kg/m². Wt Readings from Last 3 Encounters:   07/28/21 (!) 346 lb 9.6 oz (157.2 kg)   06/10/21 (!) 345 lb (156.5 kg)   10/27/20 (!) 335 lb 3.2 oz (152 kg)     Weight gained 12 lbs over 1 year  Vitals: /88 (Site: Left Upper Arm, Position: Sitting, Cuff Size: Large Adult)   Pulse 69   Temp 97.2 °F (36.2 °C) (Tympanic)   Ht 6' (1.829 m)   Wt (!) 346 lb 9.6 oz (157.2 kg)   SpO2 96% Comment: room air  BMI 47.01 kg/m²       Physical Exam  Constitutional:       Appearance: He is well-developed. HENT:      Head: Normocephalic and atraumatic. Right Ear: External ear normal.      Left Ear: External ear normal.   Eyes:      Conjunctiva/sclera: Conjunctivae normal.      Pupils: Pupils are equal, round, and reactive to light. Cardiovascular:      Rate and Rhythm: Normal rate and regular rhythm. Heart sounds: Normal heart sounds. Pulmonary:      Effort: Pulmonary effort is normal.      Breath sounds: Normal breath sounds. Musculoskeletal:         General: Normal range of motion. Cervical back: Normal range of motion and neck supple. Skin:     General: Skin is warm and dry. Neurological:      Mental Status: He is alert and oriented to person, place, and time. Psychiatric:         Behavior: Behavior normal.         Thought Content: Thought content normal.         Judgment: Judgment normal.           ASSESSMENT/DIAGNOSIS     Diagnosis Orders   1. Obstructive sleep apnea on CPAP     2. Morbid obesity with BMI of 45.0-49.9, adult [facility])              Plan   Do you need any equipment today?  Yes mask re-fit  - Download reviewed and discussed with patient  - He  was advised to continue current positive airway pressure therapy with above described pressure. - He  advised to keep good compliance with current recommended pressure to get optimal results and clinical improvement  - Recommend 7-9 hours of sleep with PAP  - He was advised to call [employer] company regarding supplies if needed.   -He call my office for earlier appointment if needed for worsening of sleep symptoms.   - He was instructed on weight loss  - Miguel Ángel Gaines was educated about my impression and plan. Patient verbalizesunderstanding.   We will see Kiya Griffith back in: 1 year with download    Information added by my medical assistant/LPN was reviewed today         Rizwana Oseguera PA-C, MPAS  7/28/2021

## 2021-07-28 ENCOUNTER — OFFICE VISIT (OUTPATIENT)
Dept: PULMONOLOGY | Age: 61
End: 2021-07-28
Payer: COMMERCIAL

## 2021-07-28 VITALS
TEMPERATURE: 97.2 F | BODY MASS INDEX: 42.66 KG/M2 | HEIGHT: 72 IN | SYSTOLIC BLOOD PRESSURE: 138 MMHG | HEART RATE: 69 BPM | WEIGHT: 315 LBS | OXYGEN SATURATION: 96 % | DIASTOLIC BLOOD PRESSURE: 88 MMHG

## 2021-07-28 DIAGNOSIS — Z99.89 OBSTRUCTIVE SLEEP APNEA ON CPAP: Primary | ICD-10-CM

## 2021-07-28 DIAGNOSIS — E66.01 MORBID OBESITY WITH BMI OF 45.0-49.9, ADULT (HCC): ICD-10-CM

## 2021-07-28 DIAGNOSIS — G47.33 OBSTRUCTIVE SLEEP APNEA ON CPAP: Primary | ICD-10-CM

## 2021-07-28 PROCEDURE — 99213 OFFICE O/P EST LOW 20 MIN: CPT | Performed by: PHYSICIAN ASSISTANT

## 2021-07-28 ASSESSMENT — ENCOUNTER SYMPTOMS
EYES NEGATIVE: 1
STRIDOR: 0
WHEEZING: 0
CHEST TIGHTNESS: 0
SHORTNESS OF BREATH: 0
ALLERGIC/IMMUNOLOGIC NEGATIVE: 1
COUGH: 0
DIARRHEA: 0
BACK PAIN: 0
NAUSEA: 0

## 2021-10-06 DIAGNOSIS — E11.9 TYPE 2 DIABETES MELLITUS WITHOUT COMPLICATION, WITHOUT LONG-TERM CURRENT USE OF INSULIN (HCC): ICD-10-CM

## 2021-10-06 RX ORDER — BLOOD SUGAR DIAGNOSTIC
STRIP MISCELLANEOUS
Qty: 200 STRIP | Refills: 3 | Status: SHIPPED | OUTPATIENT
Start: 2021-10-06

## 2021-10-06 NOTE — TELEPHONE ENCOUNTER
This medication refill is regarding a electronic request.  Refill requested by [facility]. Requested Prescriptions     Pending Prescriptions Disp Refills    ONETOUCH VERIO strip [Pharmacy Med Name: ONE TOUCH VERIO STRIPS 100'S] 200 strip 3     Sig: USE TO TEST SUGAR TWICE A DAY       Date of last visit: 6/10/2021   Date of next visit: 12/13/2021  Date of last refill: 10/13/2020 200/3    Last Hemoglobin A1C:    Lab Results   Component Value Date    LABA1C 6.9 05/04/2021    AVGG 147 05/04/2021     Rx verified, ordered and set to EP.

## 2021-12-28 DIAGNOSIS — N40.0 PROSTATE HYPERTROPHY: ICD-10-CM

## 2021-12-28 RX ORDER — TAMSULOSIN HYDROCHLORIDE 0.4 MG/1
CAPSULE ORAL
Qty: 90 CAPSULE | Refills: 3 | Status: SHIPPED | OUTPATIENT
Start: 2021-12-28 | End: 2022-06-01 | Stop reason: SDUPTHER

## 2021-12-28 NOTE — TELEPHONE ENCOUNTER
This medication refill is regarding a electronic request.  Refill requested by [facility]. Requested Prescriptions     Pending Prescriptions Disp Refills    tamsulosin (FLOMAX) 0.4 MG capsule [Pharmacy Med Name: TAMSULOSIN HCL CAPS 0.4MG] 90 capsule 3     Sig: TAKE 1 CAPSULE NIGHTLY     Date of last visit: 6/10/2021   Date of next visit: 6/20/2022  Date of last refill: 1/4/2021 #90/3    Rx verified, ordered and set to EP.

## 2022-01-28 DIAGNOSIS — R97.20 ELEVATED PSA: Primary | ICD-10-CM

## 2022-03-04 ENCOUNTER — NURSE ONLY (OUTPATIENT)
Dept: LAB | Age: 62
End: 2022-03-04

## 2022-03-04 DIAGNOSIS — R97.20 ELEVATED PSA: ICD-10-CM

## 2022-03-08 ENCOUNTER — OFFICE VISIT (OUTPATIENT)
Dept: UROLOGY | Age: 62
End: 2022-03-08
Payer: COMMERCIAL

## 2022-03-08 VITALS
SYSTOLIC BLOOD PRESSURE: 158 MMHG | HEIGHT: 72 IN | WEIGHT: 315 LBS | DIASTOLIC BLOOD PRESSURE: 80 MMHG | BODY MASS INDEX: 42.66 KG/M2

## 2022-03-08 DIAGNOSIS — N40.1 BENIGN LOCALIZED PROSTATIC HYPERPLASIA WITH LOWER URINARY TRACT SYMPTOMS (LUTS): ICD-10-CM

## 2022-03-08 DIAGNOSIS — R97.20 ELEVATED PSA: ICD-10-CM

## 2022-03-08 DIAGNOSIS — R39.12 WEAK URINARY STREAM: Primary | ICD-10-CM

## 2022-03-08 LAB
BILIRUBIN URINE: NEGATIVE
BLOOD URINE, POC: NEGATIVE
CHARACTER, URINE: CLEAR
COLOR, URINE: YELLOW
GLUCOSE URINE: NEGATIVE MG/DL
KETONES, URINE: NEGATIVE
LEUKOCYTE CLUMPS, URINE: NEGATIVE
NITRITE, URINE: NEGATIVE
PH, URINE: 5.5 (ref 5–9)
POST VOID RESIDUAL (PVR): 87 ML
PROTEIN, URINE: NEGATIVE MG/DL
SPECIFIC GRAVITY, URINE: <= 1.005 (ref 1–1.03)
UROBILINOGEN, URINE: 0.2 EU/DL (ref 0–1)

## 2022-03-08 PROCEDURE — 81003 URINALYSIS AUTO W/O SCOPE: CPT | Performed by: UROLOGY

## 2022-03-08 PROCEDURE — 51798 US URINE CAPACITY MEASURE: CPT | Performed by: UROLOGY

## 2022-03-08 PROCEDURE — 99214 OFFICE O/P EST MOD 30 MIN: CPT | Performed by: UROLOGY

## 2022-03-08 NOTE — PROGRESS NOTES
[address]  [address] [place] [address]  Dept: [phone]  Dept Fax: [phone]  Loc: [address] [facility] Urology Office Note      Patient:  Jay Jay Wheeler  YOB: 1960    The patient is a 60 y.o. male who presents today for evaluation of the following problems:   Chief Complaint   Patient presents with    Follow-up     psa prior    Elevated PSA        History of Present Illness:    Elevated PSA  Biopsy x 2  Still awaiting today's PSA    BPH  90+ gram prostate  Worsening urinary symptoms  auass 28- mostly dissatisifed  On flomax daily        Requested/reviewed records from Nigel Mas MD office and/or outside [de-identified]    (Patient's old records have been requested, reviewed and pertinent findings summarized in today's note.)    Procedures Today: N/A    Last several PSA's:  Lab Results   Component Value Date    PSA 5.29 (H) 10/21/2020    PSA 5.01 (H) 02/28/2019    PSA 5.49 (H) 09/26/2018       Last total testosterone:  No results found for: TESTOSTERONE    Urinalysis today:  Results for POC orders placed in visit on 03/08/22   POCT Urinalysis No Micro (Auto)   Result Value Ref Range    Glucose, Ur Negative NEGATIVE mg/dl    Bilirubin Urine Negative     Ketones, Urine Negative NEGATIVE    Specific Gravity, Urine <= 1.005 1.002 - 1.030    Blood, UA POC Negative NEGATIVE    pH, Urine 5.50 5.0 - 9.0    Protein, Urine Negative NEGATIVE mg/dl    Urobilinogen, Urine 0.20 0.0 - 1.0 eu/dl    Nitrite, Urine Negative NEGATIVE    Leukocyte Clumps, Urine Negative NEGATIVE    Color, Urine Yellow YELLOW-STRAW    Character, Urine Clear CLR-SL.CLOUD   poct post void residual   Result Value Ref Range    post void residual 87 ml       Last BUN and creatinine:  Lab Results   Component Value Date    BUN 13 05/04/2021     Lab Results   Component Value Date    CREATININE 0.6 05/04/2021       Imaging Reviewed during this Office Visit:   Janus Gowers, MD independently reviewed the images and verified the radiology reports from:    MRI PELVIS W WO CONTRAST    Result Date: 11/19/2019  PROCEDURE: MRI PELVIS W WO CONTRAST CLINICAL INFORMATION: Elevated PSA. History of negative biopsy 2 years ago. COMPARISON: None available. TECHNIQUE: Axial T2, coronal T2 and sagittal T2 imaging of the prostate gland. Axial T1, axial T1 VIBE dynamic post mauricio and axial T1 VIBE dynamic subtracted post mauricio images through the prostate gland. Diffusion 50, 800, 1400 and ADC maps through the prostate gland. Axial T1 STAR VIBE post mauricio through the pelvis. 3-D images reconstructed on a separate BUYSTAND Cad workstation with MRI TRUS fusion of prostate mass lesions. CONTRAST: 20 mL ProHance intravenous contrast. LABORATORY: PSA = 6.27 on October 25, 2019. PSA = 7.1 on September 30, 2019. PSA = 5.0 on February 28, 2019. PSA = 5.49 on September 26, 2018. PSA = 4.26 on February 22, 2018. FINDINGS: PROSTATE SIZE: The prostate gland is enlarged and measures approximately 6.0 x 7.0 x 5.8 cm. The prostate volume is 127.5 mL TRANSITIONAL ZONE: Multiple heterogeneous and well defined nodules are present throughout the transitional zone. No ill-defined area of hypointense T2 signal is identified. There is a well-circumscribed area of hypointense T2 signal and mild restricted diffusion within the  right apex of the gland measuring approximately 1.6 x 1.4 x 1.9 cm. CENTRAL ZONE: Unremarkable. PERIPHERAL ZONE: Areas of linear, intermediate T2 signal are noted within the peripheral zone without significant restricted diffusion or abnormal pattern of dynamic contrast enhancement. PROSTATE CAPSULE/NV BUNDLE/SEMINAL VESICLES: Unremarkable. URINARY BLADDER/RECTUM/PELVIC DIAPHRAGM: Unremarkable. PATHOLOGICALLY ENLARGED LYMPH NODES: None. OSSEOUS STRUCTURES: No suspicious osseous lesion is identified. 1. The prostate gland is enlarged.  Multiple, well-circumscribed nodules are present throughout the transitional zone which most likely correspond to nodules of benign prostatic hypertrophy (PI-RADS 2). 2. There is a well-circumscribed focus of hypointense T2 signal within the right apex of the gland measuring up to 1.9 cm and demonstrating mild restricted diffusion (PI-RADS 2). This may correspond to an atypical nodule. Overall, this is considered PI-RADS 2: Low (clinically significant cancer is unlikely to be present). **This report has been created using voice recognition software. It may contain minor errors which are inherent in voice recognition technology. ** Final report electronically signed by Dr. Perry Brown on 11/19/2019 10:13 AM      PAST MEDICAL, FAMILY AND SOCIAL HISTORY:  Past Medical History:   Diagnosis Date    Anxiety     Depression     Hypertension     Obesity     Type 2 diabetes mellitus without complication, without long-term current use of insulin ([address]     Unspecified sleep apnea     Wears CPAP     Past Surgical History:   Procedure Laterality Date    ABDOMEN SURGERY      APPENDECTOMY      BACK SURGERY      Herniated disc lumbar--Dr. Massimo Stephen.     CHOLECYSTECTOMY      COLONOSCOPY       Family History   Problem Relation Age of Onset    COPD Mother     Diabetes Father     Heart Attack Father     Other Father         Peritonitis     Outpatient Medications Marked as Taking for the 3/8/22 encounter (Office Visit) with Aries Guerrero MD   Medication Sig Dispense Refill    tamsulosin (FLOMAX) 0.4 MG capsule TAKE 1 CAPSULE NIGHTLY 90 capsule 3    ONETOUCH VERIO strip USE TO TEST SUGAR TWICE A  strip 3    sertraline (ZOLOFT) 50 MG tablet TAKE 1 TABLET DAILY 90 tablet 3    hydroCHLOROthiazide (MICROZIDE) 12.5 MG capsule TAKE 1 CAPSULE DAILY 90 capsule 3    losartan (COZAAR) 100 MG tablet TAKE 1 TABLET DAILY 90 tablet 3    Multiple Vitamins-Minerals (MULTIVITAMIN PO) Take by mouth daily      blood glucose monitor kit and supplies One Touch Verio Glucometer. Sig: test sugar daily. Dx: E11.9 1 kit 0    docusate sodium (COLACE) 100 MG capsule Take 100 mg by mouth as needed       Lancets MISC Lancet for Contour Next Lancing Device or per patient preference. Sig: test sugar BID. Dx: E11.9 200 each 3    Red Yeast Rice Extract (RED YEAST RICE PO) Take 600 mg by mouth daily Taking 2 tabs daily      Omega-3 Fatty Acids (FISH OIL) 1000 MG CPDR Take 1,000 mg by mouth daily       sildenafil (VIAGRA) 100 MG tablet Take 1/2 to 1 tab as needed, use as instructed. 10 tablet 5    CPAP Machine MISC by Does not apply route Please change his Auto CPAP pressure settings to minimum pressure of 10cm H20 and maximum pressure of 14cm H20. 1 each 0    vitamin D (CHOLECALCIFEROL) 400 UNITS TABS tablet Take 400 Units by mouth daily      aspirin 81 MG EC tablet Take 81 mg by mouth daily. Patient has no known allergies. Social History     Tobacco Use   Smoking Status Never Smoker   Smokeless Tobacco Never Used      (If patient a smoker, smoking cessation counseling offered)   Social History     Substance and Sexual Activity   Alcohol Use Yes    Alcohol/week: 0.0 standard drinks    Comment: occasional       REVIEW OF SYSTEMS:  Constitutional: negative  Eyes: negative  Respiratory: negative  Cardiovascular: negative  Gastrointestinal: negative  Genitourinary: see HPI  Musculoskeletal: negative  Skin: negative   Neurological: negative  Hematological/Lymphatic: negative  Psychological: negative      Physical Exam:    This a 60 y.o. male  Vitals:    03/08/22 0745   BP: (!) 158/80     Body mass index is 46.79 kg/m². Constitutional: Patient in no acute distress;       Assessment and Plan        1. Weak urinary stream    2. Elevated PSA    3. Benign localized prostatic hyperplasia with lower urinary tract symptoms (LUTS)               Plan:      BPH- worsening urinary symptoms. On flomax.  Cystoscopy in office in anticipation of poss outlet surgery. Elevated PSA- biopsy x 2 in the past. Awaiting PSA      Prescriptions Ordered:  No orders of the defined types were placed in this encounter.      Orders Placed:  Orders Placed This Encounter   Procedures    POCT Urinalysis No Micro (Auto)    poct post void residual     Bladder scan            BALDO Gifford MD

## 2022-03-10 ENCOUNTER — HOSPITAL ENCOUNTER (EMERGENCY)
Age: 62
Discharge: HOME | End: 2022-03-10
Payer: COMMERCIAL

## 2022-03-10 VITALS
RESPIRATION RATE: 19 BRPM | HEART RATE: 81 BPM | WEIGHT: 315 LBS | DIASTOLIC BLOOD PRESSURE: 73 MMHG | OXYGEN SATURATION: 97 % | SYSTOLIC BLOOD PRESSURE: 140 MMHG | TEMPERATURE: 97.8 F | BODY MASS INDEX: 42.66 KG/M2 | HEIGHT: 72 IN

## 2022-03-10 DIAGNOSIS — J01.00 ACUTE MAXILLARY SINUSITIS, RECURRENCE NOT SPECIFIED: Primary | ICD-10-CM

## 2022-03-10 PROCEDURE — 99213 OFFICE O/P EST LOW 20 MIN: CPT | Performed by: NURSE PRACTITIONER

## 2022-03-10 PROCEDURE — 99213 OFFICE O/P EST LOW 20 MIN: CPT

## 2022-03-10 RX ORDER — AMOXICILLIN AND CLAVULANATE POTASSIUM 875; 125 MG/1; MG/1
1 TABLET, FILM COATED ORAL 2 TIMES DAILY
Qty: 14 TABLET | Refills: 0 | Status: SHIPPED | OUTPATIENT
Start: 2022-03-10 | End: 2022-03-17

## 2022-03-10 RX ORDER — BENZONATATE 200 MG/1
200 CAPSULE ORAL 3 TIMES DAILY PRN
Qty: 30 CAPSULE | Refills: 0 | Status: SHIPPED | OUTPATIENT
Start: 2022-03-10 | End: 2022-03-17

## 2022-03-10 ASSESSMENT — ENCOUNTER SYMPTOMS
RHINORRHEA: 1
CHEST TIGHTNESS: 0
DIARRHEA: 0
NAUSEA: 0
SHORTNESS OF BREATH: 0
VOMITING: 0
SORE THROAT: 1
COUGH: 1

## 2022-03-10 NOTE — ED PROVIDER NOTES
[address]  Urgent Care Encounter       CHIEF COMPLAINT       Chief Complaint   Patient presents with    Nasal Congestion       Nurses Notes reviewed and I agree except as noted in the HPI. HISTORY OF PRESENT ILLNESS   Steve Rodriguez is a 60 y.o. male who presents to the [facility] amb[facility] care center for evaluation of nasal congestion. He reports his symptoms started roughly 3 weeks ago. He reports his symptoms as sinus pressure, nasal congestion, rhinorrhea, postnasal drainage, scratchy throat, and productive cough. He denies dyspnea, nausea, vomiting, and diarrhea. He denies fever, but reports chills. He does report using Coricidin along with cough drops without relief of symptoms. The history is provided by the patient. No  was used. REVIEW OF SYSTEMS     Review of Systems   Constitutional: Positive for chills. Negative for activity change, appetite change, fatigue and fever. HENT: Positive for congestion, postnasal drip, rhinorrhea and sore throat. Negative for ear discharge and ear pain. Respiratory: Positive for cough. Negative for chest tightness and shortness of breath. Cardiovascular: Negative for chest pain. Gastrointestinal: Negative for diarrhea, nausea and vomiting. Genitourinary: Negative for dysuria. Skin: Negative for rash. Allergic/Immunologic: Negative for environmental allergies and food allergies. Neurological: Negative for dizziness and headaches. PAST MEDICAL HISTORY         Diagnosis Date    Anxiety     Depression     Hypertension     Obesity     Type 2 diabetes mellitus without complication, without long-term current use of insulin (HCC)     Unspecified sleep apnea     Wears CPAP       SURGICALHISTORY     Patient  has a past surgical history that includes Cholecystectomy; back surgery; Colonoscopy; Abdomen surgery; and Appendectomy.     CURRENT MEDICATIONS       Previous Medications    ASPIRIN 81 MG EC ED TRIAGE VITALS  BP: (!) 140/73, Temp: 97.8 °F (36.6 °C), Pulse: 81, Resp: 19, SpO2: 97 %,Estimated body mass index is 46.79 kg/m² as calculated from the following:    Height as of this encounter: 6' (1.829 m). Weight as of this encounter: 345 lb (156.5 kg). ,No LMP for male patient. Physical Exam  Vitals and nursing note reviewed. Constitutional:       General: He is not in acute distress. Appearance: Normal appearance. He is not ill-appearing, toxic-appearing or diaphoretic. HENT:      Head: Normocephalic. Right Ear: Ear canal and external ear normal.      Left Ear: Ear canal and external ear normal.      Nose: No congestion or rhinorrhea. Right Sinus: Maxillary sinus tenderness present. Left Sinus: Maxillary sinus tenderness present. Mouth/Throat:      Mouth: Mucous membranes are moist.      Pharynx: Oropharynx is clear. No oropharyngeal exudate or posterior oropharyngeal erythema. Cardiovascular:      Rate and Rhythm: Normal rate. Pulses: Normal pulses. Pulmonary:      Effort: Pulmonary effort is normal. No respiratory distress. Breath sounds: No stridor. No wheezing or rhonchi. Abdominal:      General: Abdomen is flat. Bowel sounds are normal.      Palpations: Abdomen is soft. Musculoskeletal:         General: No swelling or tenderness. Normal range of motion. Cervical back: Normal range of motion. Neurological:      General: No focal deficit present. Mental Status: He is alert and oriented to person, place, and time. Psychiatric:         Mood and Affect: Mood normal.         Behavior: Behavior normal.         DIAGNOSTIC RESULTS     Labs:No results found for this visit on 03/10/22.     IMAGING:    No orders to display         EKG: None      URGENT CARE COURSE:     Vitals:    03/10/22 0855   BP: (!) 140/73   Pulse: 81   Resp: 19   Temp: 97.8 °F (36.6 °C)   SpO2: 97%   Weight: (!) 345 lb (156.5 kg)   Height: 6' (1.829 m)       Medications - No data to display         PROCEDURES:  None    FINAL IMPRESSION      1. Acute maxillary sinusitis, recurrence not specified          DISPOSITION/ PLAN     Patient seen and evaluated for nasal congestion. Assessment consistent with likely acute maxillary sinusitis. He is provided a prescription for Augmentin and Tessalon Perles. He is instructed use over-the-counter Zyrtec and Flonase. He is instructed use over-the-counter Tylenol and Motrin for pain or fever. Instructed to follow-up with his PCP in 3 to 5 days with new or worsening symptoms. He is agreeable to above plan and denies questions or concerns at this time.       PATIENT REFERRED TO:  Dayna Jefferson MD  [place] [address] [facility] [place] [address]      DISCHARGE MEDICATIONS:  New Prescriptions    AMOXICILLIN-CLAVULANATE (AUGMENTIN) 875-125 MG PER TABLET    Take 1 tablet by mouth 2 times daily for 7 days    BENZONATATE (TESSALON) 200 MG CAPSULE    Take 1 capsule by mouth 3 times daily as needed for Cough       Discontinued Medications    No medications on file       Current Discharge Medication List          KRIS Mike CNP    (Please note that portions of this note were completed with a voice recognition program. Efforts were made to edit the dictations but occasionally words are mis-transcribed.)           KRIS Mike CNP  03/10/22 0990

## 2022-03-17 LAB — PROSTATE SPECIFIC ANTIGEN FREE: NORMAL

## 2022-03-28 RX ORDER — HYDROCHLOROTHIAZIDE 12.5 MG/1
CAPSULE, GELATIN COATED ORAL
Qty: 90 CAPSULE | Refills: 3 | Status: SHIPPED | OUTPATIENT
Start: 2022-03-28

## 2022-03-28 RX ORDER — LOSARTAN POTASSIUM 100 MG/1
TABLET ORAL
Qty: 90 TABLET | Refills: 3 | Status: SHIPPED | OUTPATIENT
Start: 2022-03-28

## 2022-03-28 NOTE — TELEPHONE ENCOUNTER
This medication refill is regarding a electronic request.  Refill requested by [facility]. Requested Prescriptions     Pending Prescriptions Disp Refills    hydroCHLOROthiazide (MICROZIDE) 12.5 MG capsule [Pharmacy Med Name: HYDROCHLOROTHIAZIDE CAPS 12.5MG] 90 capsule 3     Sig: TAKE 1 CAPSULE DAILY    losartan (COZAAR) 100 MG tablet [Pharmacy Med Name: LOSARTAN TABS 100MG] 90 tablet 3     Sig: TAKE 1 TABLET DAILY     Date of last visit: 6/10/2021   Date of next visit: 6/20/22  Date of last refill: 4/5/21 #90/3 for both    Last CMP:   Lab Results   Component Value Date     05/04/2021    K 4.6 05/04/2021     05/04/2021    CO2 27 05/04/2021    BUN 13 05/04/2021    CREATININE 0.6 05/04/2021    GLUCOSE 143 (H) 05/04/2021    CALCIUM 10.1 05/04/2021    PROT 7.7 05/04/2021    LABALBU 4.4 05/04/2021    BILITOT 0.5 05/04/2021    ALKPHOS 91 05/04/2021    AST 22 05/04/2021    ALT 29 05/04/2021    LABGLOM >90 05/04/2021     Rx verified, ordered and set to EP.

## 2022-04-01 ENCOUNTER — PROCEDURE VISIT (OUTPATIENT)
Dept: UROLOGY | Age: 62
End: 2022-04-01
Payer: COMMERCIAL

## 2022-04-01 VITALS
DIASTOLIC BLOOD PRESSURE: 88 MMHG | SYSTOLIC BLOOD PRESSURE: 154 MMHG | HEIGHT: 72 IN | WEIGHT: 315 LBS | BODY MASS INDEX: 42.66 KG/M2

## 2022-04-01 DIAGNOSIS — N40.1 BENIGN LOCALIZED PROSTATIC HYPERPLASIA WITH LOWER URINARY TRACT SYMPTOMS (LUTS): ICD-10-CM

## 2022-04-01 DIAGNOSIS — R97.20 ELEVATED PSA: ICD-10-CM

## 2022-04-01 DIAGNOSIS — R39.12 WEAK URINARY STREAM: Primary | ICD-10-CM

## 2022-04-01 PROCEDURE — 52000 CYSTOURETHROSCOPY: CPT | Performed by: UROLOGY

## 2022-04-01 NOTE — PROGRESS NOTES
Cystoscopy    Operative Note    Patient:  Magdy Juan  MRN: [id number]  YOB: 1960    Date: 22  Surgeon: Marisa Maurice MD  Anesthesia: Urojet Local  Indications: BPH    BPH- worsening urinary symptoms. On flomax. Elevated PSA- biopsy x 2 in the past. PSA stable      Position: Supine  EBL: 0 ml    Findings:   The patient was prepped and draped in the usual sterile fashion. The flexible cystoscope was advanced through the urethra and into the bladder. The bladder was thoroughly inspected and the following was noted:    Residual Urine: significant\" \" . Urine clear, with no obvious infection  Urethra: No abnormalities of the urethra are noted. Urethral dilation was not performed. Prostate: trilobar hyperplasia +++, markedly obstructing, intravesical extension of prostate was present. There was no previous TURP defect. Bladder: no tumor noted . Moderate trabeculation noted. no bladder diverticulum. Ureters: Orifices with normal configuration and location. The cystoscope was removed. The patient tolerated the procedure well.   Discussed PVP with patient (1.3)

## 2022-04-04 ENCOUNTER — TELEPHONE (OUTPATIENT)
Dept: UROLOGY | Age: 62
End: 2022-04-04

## 2022-04-04 DIAGNOSIS — N40.1 BENIGN LOCALIZED PROSTATIC HYPERPLASIA WITH LOWER URINARY TRACT SYMPTOMS (LUTS): ICD-10-CM

## 2022-04-04 DIAGNOSIS — R39.12 WEAK URINARY STREAM: Primary | ICD-10-CM

## 2022-04-04 DIAGNOSIS — Z01.818 PRE-OP TESTING: ICD-10-CM

## 2022-04-04 NOTE — TELEPHONE ENCOUNTER
Patient scheduled for surgery with Dr Joaquín Richards on 5/17/22. Surgery consent on arrival. Patient to do pre op urine culture and fasting labs on 5/3/22. Surgery instructions mailed to the patient.     Patient will have an adult over the age of 25 at the time of discharge and for 24 hours after the procedure    For Surjit Confirmation # [id number] per Saray Munson

## 2022-04-04 NOTE — TELEPHONE ENCOUNTER
SURGERY [address] [facility] [address] AND[place]INA ROPER AM [place], [address]      Phone *[phone] *[phone]   Surgical Scheduling Direct Line Phone *[phone] Fax *[phone]      Minnie Stanton 1960 male    [address]  [address]    Marital Status:          Home Phone: [phone]      Cell Phone:    Telephone Information:   Mobile [phone]          Surgeon: Dr. Jailyn Mak Surgery Date: 5/17/22   Time: 9:30 am    Procedure: Cystoscopy, Greenlight Photovaporization of the Prostate    Diagnosis: Weak Urinary Stream, BPH     Important Medical History:  In [facility]    Special Inst/Equip:                   For Surjit # [id number] teja Blas    CPT Codes:    10129  Latex Allergy: No     Cardiac Device:  No    Anesthesia:  General          Admission Type:  Same Day                        Admit Prior to Day of Surgery: No    Case Location:  Main OR            Preadmission Testing:  Phone Call          PAT Date and Time:______________________________________________________    PAT Confirmation #: ______________________________________________________    Post Op Visit: ___________________________________________________________    Need Preop Cardiac Clearance: No    Does Patient have Cardiologist/physician?      None    Surgery Confirmation #: __________________________________________________    : ________________________   Date: __________________________     [employer] Name: Steve Gonzalez

## 2022-04-04 NOTE — TELEPHONE ENCOUNTER
DO NOT TAKE ASPIRIN,  FISH OIL, COUMADIN, IBUPROFEN, MOTRIN-LIKE DRUGS AND ANY MULTIVITAMINS OR OVER THE COUNTER SUPPLEMENTS 14 DAYS PRIOR TO SURGERY. MUST HAVE AN ADULT OVER THE AGE OF 18 WITH YOU AT THE TIME OF THE PROCEDURE AND WITH YOU AT HOME AFTER THE PROCEDURE FOR 24 HOURS          Rosa Acuna 1960 Diagnosis:     Surgical Physician: Dr. Ambrocio Elmore have been scheduled for the procedure marked below:      Surgery: Cystoscopy, Greenlight Photovaporization of the Prostate         Date: 5/17/22     Anesthesia: Anesthesiologist (General/Spinal)     Place of Service: [address] [place] [address] Second Floor Same Day Surgery         Arrive to same day surgery by:  7:30 am  (Surgery time is subject to change)      INSTRUCTIONS AS MARKED BELOW:    1.  DO NOT eat or drink anything after midnight before surgery. 2.  We prefer you shower or bathe with an antibacterial soap (Dial) the morning of surgery. 3.  Please ensure to have a  with you to transport you home. 4.  Please bring a current medication list, photo ID and insurance card(s) with you  5. Okay to take Tylenol  6. If you take Glucophage, Metformin or Janumet, hold 48-hours prior to surgery  7. Take blood pressure or heart medication as directed, if taken in the morning take with a small sip of water  8. The office will call you in 1-2 days after your procedure to schedule a follow up. DATE SENSITIVE TESTING-DO ON THE DATE LISTED *WALK IN *NO APPOINTMENT    1. DO THE PRE OP URINE CULTURE AND FASTING LABS ON 5/3/22.  ORDER INCLUDED        Date: 4/4/2022

## 2022-04-05 ENCOUNTER — TELEPHONE (OUTPATIENT)
Dept: UROLOGY | Age: 62
End: 2022-04-05

## 2022-04-05 NOTE — TELEPHONE ENCOUNTER
No pre cert needed for CPT 95279 per Jaime Gray with [employer] [facility] 4/5/22 @ 853 am
Statement Selected

## 2022-04-19 ENCOUNTER — PREP FOR PROCEDURE (OUTPATIENT)
Dept: UROLOGY | Age: 62
End: 2022-04-19

## 2022-04-19 RX ORDER — SODIUM CHLORIDE 9 MG/ML
INJECTION, SOLUTION INTRAVENOUS CONTINUOUS
Status: CANCELLED | OUTPATIENT
Start: 2022-05-17

## 2022-05-04 ENCOUNTER — HOSPITAL ENCOUNTER (OUTPATIENT)
Age: 62
Discharge: HOME | End: 2022-05-04
Payer: COMMERCIAL

## 2022-05-04 DIAGNOSIS — R39.12 WEAK URINARY STREAM: ICD-10-CM

## 2022-05-04 DIAGNOSIS — Z01.818 PRE-OP TESTING: ICD-10-CM

## 2022-05-04 DIAGNOSIS — N40.1 BENIGN LOCALIZED PROSTATIC HYPERPLASIA WITH LOWER URINARY TRACT SYMPTOMS (LUTS): ICD-10-CM

## 2022-05-04 LAB
ANION GAP SERPL CALCULATED.3IONS-SCNC: 14 MEQ/L (ref 8–16)
BASOPHILS # BLD: 0.7 %
BASOPHILS ABSOLUTE: 0.1 THOU/MM3 (ref 0–0.1)
BUN BLDV-MCNC: 19 MG/DL (ref 7–22)
CALCIUM SERPL-MCNC: 9.8 MG/DL (ref 8.5–10.5)
CHLORIDE BLD-SCNC: 98 MEQ/L (ref 98–111)
CO2: 25 MEQ/L (ref 23–33)
CREAT SERPL-MCNC: 0.5 MG/DL (ref 0.4–1.2)
EOSINOPHIL # BLD: 1.3 %
EOSINOPHILS ABSOLUTE: 0.1 THOU/MM3 (ref 0–0.4)
ERYTHROCYTE [DISTWIDTH] IN BLOOD BY AUTOMATED COUNT: 12.2 % (ref 11.5–14.5)
ERYTHROCYTE [DISTWIDTH] IN BLOOD BY AUTOMATED COUNT: 40.2 FL (ref 35–45)
GFR SERPL CREATININE-BSD FRML MDRD: > 90 ML/MIN/1.73M2
GLUCOSE BLD-MCNC: 163 MG/DL (ref 70–108)
HCT VFR BLD CALC: 43.2 % (ref 42–52)
HEMOGLOBIN: 14.1 GM/DL (ref 14–18)
IMMATURE GRANS (ABS): 0.02 THOU/MM3 (ref 0–0.07)
IMMATURE GRANULOCYTES: 0.3 %
LYMPHOCYTES # BLD: 18.8 %
LYMPHOCYTES ABSOLUTE: 1.4 THOU/MM3 (ref 1–4.8)
MCH RBC QN AUTO: 29.4 PG (ref 26–33)
MCHC RBC AUTO-ENTMCNC: 32.6 GM/DL (ref 32.2–35.5)
MCV RBC AUTO: 90.2 FL (ref 80–94)
MONOCYTES # BLD: 8.1 %
MONOCYTES ABSOLUTE: 0.6 THOU/MM3 (ref 0.4–1.3)
NUCLEATED RED BLOOD CELLS: 0 /100 WBC
PLATELET # BLD: 252 THOU/MM3 (ref 130–400)
PMV BLD AUTO: 10 FL (ref 9.4–12.4)
POTASSIUM SERPL-SCNC: 4.3 MEQ/L (ref 3.5–5.2)
RBC # BLD: 4.79 MILL/MM3 (ref 4.7–6.1)
SEG NEUTROPHILS: 70.8 %
SEGMENTED NEUTROPHILS ABSOLUTE COUNT: 5.2 THOU/MM3 (ref 1.8–7.7)
SODIUM BLD-SCNC: 137 MEQ/L (ref 135–145)
WBC # BLD: 7.4 THOU/MM3 (ref 4.8–10.8)

## 2022-05-04 PROCEDURE — 80048 BASIC METABOLIC PNL TOTAL CA: CPT

## 2022-05-04 PROCEDURE — 36415 COLL VENOUS BLD VENIPUNCTURE: CPT

## 2022-05-04 PROCEDURE — 87086 URINE CULTURE/COLONY COUNT: CPT

## 2022-05-04 PROCEDURE — 85025 COMPLETE CBC W/AUTO DIFF WBC: CPT

## 2022-05-06 LAB
ORGANISM: ABNORMAL
URINE CULTURE, ROUTINE: ABNORMAL

## 2022-05-10 DIAGNOSIS — N40.1 BENIGN LOCALIZED PROSTATIC HYPERPLASIA WITH LOWER URINARY TRACT SYMPTOMS (LUTS): Primary | ICD-10-CM

## 2022-05-10 DIAGNOSIS — Z01.818 PRE-OP TESTING: ICD-10-CM

## 2022-05-10 NOTE — PROGRESS NOTES
Following instructions given to patient, who states understanding:    NPO after midnight  [employer] and 's license  Wear comfortable clean clothing  Do not bring jewelry   Shower night before and morning of surgery with a liquid antibacterial soap  Bring medications in original bottles  Follow all instructions given by your physician   needed at discharge  Call [phone] for any questions  Report to SDS on 2nd floor  If you would become ill prior to surgery, please call the surgeon  May have a visitor with you, we request that you limit to 2 visitors in pre-op area  Please bring and wear mask

## 2022-05-12 ENCOUNTER — HOSPITAL ENCOUNTER (OUTPATIENT)
Age: 62
Discharge: HOME | End: 2022-05-12
Payer: COMMERCIAL

## 2022-05-12 DIAGNOSIS — Z01.818 PRE-OP TESTING: ICD-10-CM

## 2022-05-12 DIAGNOSIS — N40.1 BENIGN LOCALIZED PROSTATIC HYPERPLASIA WITH LOWER URINARY TRACT SYMPTOMS (LUTS): ICD-10-CM

## 2022-05-12 LAB
EKG ATRIAL RATE: 60 BPM
EKG ATRIAL RATE: 64 BPM
EKG P AXIS: 42 DEGREES
EKG P-R INTERVAL: 160 MS
EKG P-R INTERVAL: 174 MS
EKG Q-T INTERVAL: 442 MS
EKG Q-T INTERVAL: 444 MS
EKG QRS DURATION: 116 MS
EKG QRS DURATION: 118 MS
EKG QTC CALCULATION (BAZETT): 444 MS
EKG QTC CALCULATION (BAZETT): 455 MS
EKG R AXIS: -17 DEGREES
EKG R AXIS: -22 DEGREES
EKG T AXIS: 148 DEGREES
EKG T AXIS: 44 DEGREES
EKG VENTRICULAR RATE: 60 BPM
EKG VENTRICULAR RATE: 64 BPM

## 2022-05-12 PROCEDURE — 93005 ELECTROCARDIOGRAM TRACING: CPT | Performed by: UROLOGY

## 2022-05-12 PROCEDURE — 93010 ELECTROCARDIOGRAM REPORT: CPT | Performed by: NUCLEAR MEDICINE

## 2022-05-16 ENCOUNTER — TELEPHONE (OUTPATIENT)
Dept: UROLOGY | Age: 62
End: 2022-05-16

## 2022-05-16 NOTE — TELEPHONE ENCOUNTER
Patient exposed to Covid on 5/13/22. Patient states he is not having any symptoms.  Spoke with PAT and they stated as long as he shows no symptoms we may proceed with surgery on 5/17/22

## 2022-05-17 ENCOUNTER — HOSPITAL ENCOUNTER (OUTPATIENT)
Age: 62
Setting detail: OUTPATIENT SURGERY
Discharge: HOME | End: 2022-05-17
Attending: UROLOGY | Admitting: UROLOGY
Payer: COMMERCIAL

## 2022-05-17 ENCOUNTER — ANESTHESIA EVENT (OUTPATIENT)
Dept: OPERATING ROOM | Age: 62
End: 2022-05-17
Payer: COMMERCIAL

## 2022-05-17 ENCOUNTER — ANESTHESIA (OUTPATIENT)
Dept: OPERATING ROOM | Age: 62
End: 2022-05-17
Payer: COMMERCIAL

## 2022-05-17 VITALS
RESPIRATION RATE: 16 BRPM | HEART RATE: 54 BPM | OXYGEN SATURATION: 94 % | WEIGHT: 315 LBS | DIASTOLIC BLOOD PRESSURE: 68 MMHG | BODY MASS INDEX: 42.66 KG/M2 | SYSTOLIC BLOOD PRESSURE: 126 MMHG | HEIGHT: 72 IN | TEMPERATURE: 97.6 F

## 2022-05-17 DIAGNOSIS — G89.18 POST-OP PAIN: Primary | ICD-10-CM

## 2022-05-17 PROCEDURE — 3600000013 HC SURGERY LEVEL 3 ADDTL 15MIN: Performed by: UROLOGY

## 2022-05-17 PROCEDURE — 7100000001 HC PACU RECOVERY - ADDTL 15 MIN: Performed by: UROLOGY

## 2022-05-17 PROCEDURE — 2720000010 HC SURG SUPPLY STERILE: Performed by: UROLOGY

## 2022-05-17 PROCEDURE — 6360000002 HC RX W HCPCS: Performed by: UROLOGY

## 2022-05-17 PROCEDURE — 7100000011 HC PHASE II RECOVERY - ADDTL 15 MIN: Performed by: UROLOGY

## 2022-05-17 PROCEDURE — 6360000002 HC RX W HCPCS: Performed by: ANESTHESIOLOGY

## 2022-05-17 PROCEDURE — 3700000000 HC ANESTHESIA ATTENDED CARE: Performed by: UROLOGY

## 2022-05-17 PROCEDURE — 3600000003 HC SURGERY LEVEL 3 BASE: Performed by: UROLOGY

## 2022-05-17 PROCEDURE — 7100000000 HC PACU RECOVERY - FIRST 15 MIN: Performed by: UROLOGY

## 2022-05-17 PROCEDURE — 7100000010 HC PHASE II RECOVERY - FIRST 15 MIN: Performed by: UROLOGY

## 2022-05-17 PROCEDURE — 2709999900 HC NON-CHARGEABLE SUPPLY: Performed by: UROLOGY

## 2022-05-17 PROCEDURE — 3700000001 HC ADD 15 MINUTES (ANESTHESIA): Performed by: UROLOGY

## 2022-05-17 PROCEDURE — 2580000003 HC RX 258: Performed by: UROLOGY

## 2022-05-17 PROCEDURE — 2500000003 HC RX 250 WO HCPCS: Performed by: ANESTHESIOLOGY

## 2022-05-17 RX ORDER — ONDANSETRON 2 MG/ML
INJECTION INTRAMUSCULAR; INTRAVENOUS PRN
Status: DISCONTINUED | OUTPATIENT
Start: 2022-05-17 | End: 2022-05-17 | Stop reason: SDUPTHER

## 2022-05-17 RX ORDER — SODIUM CHLORIDE 0.9 % (FLUSH) 0.9 %
5-40 SYRINGE (ML) INJECTION PRN
Status: DISCONTINUED | OUTPATIENT
Start: 2022-05-17 | End: 2022-05-17 | Stop reason: HOSPADM

## 2022-05-17 RX ORDER — GLYCOPYRROLATE 1 MG/5 ML
SYRINGE (ML) INTRAVENOUS PRN
Status: DISCONTINUED | OUTPATIENT
Start: 2022-05-17 | End: 2022-05-17 | Stop reason: SDUPTHER

## 2022-05-17 RX ORDER — SODIUM CHLORIDE 9 MG/ML
INJECTION, SOLUTION INTRAVENOUS CONTINUOUS
Status: DISCONTINUED | OUTPATIENT
Start: 2022-05-17 | End: 2022-05-17 | Stop reason: HOSPADM

## 2022-05-17 RX ORDER — DEXAMETHASONE SODIUM PHOSPHATE 10 MG/ML
INJECTION, EMULSION INTRAMUSCULAR; INTRAVENOUS PRN
Status: DISCONTINUED | OUTPATIENT
Start: 2022-05-17 | End: 2022-05-17 | Stop reason: SDUPTHER

## 2022-05-17 RX ORDER — NEOSTIGMINE METHYLSULFATE 5 MG/5 ML
SYRINGE (ML) INTRAVENOUS PRN
Status: DISCONTINUED | OUTPATIENT
Start: 2022-05-17 | End: 2022-05-17 | Stop reason: SDUPTHER

## 2022-05-17 RX ORDER — FENTANYL CITRATE 50 UG/ML
25 INJECTION, SOLUTION INTRAMUSCULAR; INTRAVENOUS EVERY 5 MIN PRN
Status: DISCONTINUED | OUTPATIENT
Start: 2022-05-17 | End: 2022-05-17 | Stop reason: HOSPADM

## 2022-05-17 RX ORDER — ROCURONIUM BROMIDE 10 MG/ML
INJECTION, SOLUTION INTRAVENOUS PRN
Status: DISCONTINUED | OUTPATIENT
Start: 2022-05-17 | End: 2022-05-17 | Stop reason: SDUPTHER

## 2022-05-17 RX ORDER — CEPHALEXIN 500 MG/1
500 CAPSULE ORAL 3 TIMES DAILY
Qty: 15 CAPSULE | Refills: 0 | Status: SHIPPED | OUTPATIENT
Start: 2022-05-17 | End: 2022-05-22

## 2022-05-17 RX ORDER — HYDROCODONE BITARTRATE AND ACETAMINOPHEN 5; 325 MG/1; MG/1
1 TABLET ORAL EVERY 6 HOURS PRN
Qty: 10 TABLET | Refills: 0 | Status: SHIPPED | OUTPATIENT
Start: 2022-05-17 | End: 2022-05-20

## 2022-05-17 RX ORDER — FENTANYL CITRATE 50 UG/ML
INJECTION, SOLUTION INTRAMUSCULAR; INTRAVENOUS PRN
Status: DISCONTINUED | OUTPATIENT
Start: 2022-05-17 | End: 2022-05-17 | Stop reason: SDUPTHER

## 2022-05-17 RX ORDER — PROPOFOL 10 MG/ML
INJECTION, EMULSION INTRAVENOUS PRN
Status: DISCONTINUED | OUTPATIENT
Start: 2022-05-17 | End: 2022-05-17 | Stop reason: SDUPTHER

## 2022-05-17 RX ORDER — SUCCINYLCHOLINE/SOD CL,ISO/PF 200MG/10ML
SYRINGE (ML) INTRAVENOUS PRN
Status: DISCONTINUED | OUTPATIENT
Start: 2022-05-17 | End: 2022-05-17 | Stop reason: SDUPTHER

## 2022-05-17 RX ORDER — FENTANYL CITRATE 50 UG/ML
50 INJECTION, SOLUTION INTRAMUSCULAR; INTRAVENOUS EVERY 5 MIN PRN
Status: DISCONTINUED | OUTPATIENT
Start: 2022-05-17 | End: 2022-05-17 | Stop reason: HOSPADM

## 2022-05-17 RX ORDER — SODIUM CHLORIDE 0.9 % (FLUSH) 0.9 %
5-40 SYRINGE (ML) INJECTION EVERY 12 HOURS SCHEDULED
Status: DISCONTINUED | OUTPATIENT
Start: 2022-05-17 | End: 2022-05-17 | Stop reason: HOSPADM

## 2022-05-17 RX ORDER — SODIUM CHLORIDE 9 MG/ML
INJECTION, SOLUTION INTRAVENOUS PRN
Status: DISCONTINUED | OUTPATIENT
Start: 2022-05-17 | End: 2022-05-17 | Stop reason: HOSPADM

## 2022-05-17 RX ADMIN — DEXAMETHASONE SODIUM PHOSPHATE 8 MG: 10 INJECTION, EMULSION INTRAMUSCULAR; INTRAVENOUS at 09:43

## 2022-05-17 RX ADMIN — ONDANSETRON 4 MG: 2 INJECTION INTRAMUSCULAR; INTRAVENOUS at 09:43

## 2022-05-17 RX ADMIN — PROPOFOL 50 MG: 10 INJECTION, EMULSION INTRAVENOUS at 10:08

## 2022-05-17 RX ADMIN — SUGAMMADEX 200 MG: 100 INJECTION, SOLUTION INTRAVENOUS at 10:30

## 2022-05-17 RX ADMIN — ROCURONIUM BROMIDE 40 MG: 50 INJECTION, SOLUTION INTRAVENOUS at 09:24

## 2022-05-17 RX ADMIN — Medication 3000 MG: at 09:34

## 2022-05-17 RX ADMIN — FENTANYL CITRATE 50 MCG: 50 INJECTION, SOLUTION INTRAMUSCULAR; INTRAVENOUS at 10:08

## 2022-05-17 RX ADMIN — PROPOFOL 175 MG: 10 INJECTION, EMULSION INTRAVENOUS at 09:24

## 2022-05-17 RX ADMIN — FENTANYL CITRATE 100 MCG: 50 INJECTION, SOLUTION INTRAMUSCULAR; INTRAVENOUS at 09:24

## 2022-05-17 RX ADMIN — Medication 0.6 MG: at 10:19

## 2022-05-17 RX ADMIN — SODIUM CHLORIDE: 9 INJECTION, SOLUTION INTRAVENOUS at 08:16

## 2022-05-17 RX ADMIN — Medication 160 MG: at 09:24

## 2022-05-17 RX ADMIN — FENTANYL CITRATE 50 MCG: 50 INJECTION, SOLUTION INTRAMUSCULAR; INTRAVENOUS at 09:35

## 2022-05-17 RX ADMIN — Medication 4 MG: at 10:19

## 2022-05-17 ASSESSMENT — PAIN DESCRIPTION - LOCATION
LOCATION: PENIS

## 2022-05-17 ASSESSMENT — PAIN - FUNCTIONAL ASSESSMENT: PAIN_FUNCTIONAL_ASSESSMENT: 0-10

## 2022-05-17 ASSESSMENT — PAIN SCALES - GENERAL
PAINLEVEL_OUTOF10: 5

## 2022-05-17 ASSESSMENT — PAIN DESCRIPTION - FREQUENCY: FREQUENCY: CONTINUOUS

## 2022-05-17 NOTE — H&P
Lobito Abdul MD  History and Physical    Patient:  Tanner Weaver  MRN: [id number]  YOB: 1960    HISTORY OF PRESENT ILLNESS:     The patient is a 60 y.o. male who presents with bph. Here for procedure. Patient's old records, notes and chart reviewed and summarized above. Lobito Abdul MD independently reviewed the images and verified the radiology reports from:    No results found. Past Medical History:    Past Medical History:   Diagnosis Date    Anxiety     Depression     Hypertension     Obesity     Type 2 diabetes mellitus without complication, without long-term current use of insulin (HCC)     Unspecified sleep apnea     Wears CPAP       Past Surgical History:    Past Surgical History:   Procedure Laterality Date    ABDOMEN SURGERY      APPENDECTOMY      BACK SURGERY      Herniated disc lumbar--Dr. Rod Silva.  CHOLECYSTECTOMY      COLONOSCOPY         Medications Prior to Admission:    Prior to Admission medications    Medication Sig Start Date End Date Taking? Authorizing Provider   hydroCHLOROthiazide (MICROZIDE) 12.5 MG capsule TAKE 1 CAPSULE DAILY 3/28/22   KRIS Miranda CNP   losartan (COZAAR) 100 MG tablet TAKE 1 TABLET DAILY 3/28/22   KRIS Miranda CNP   tamsulosin [facility]) 0.4 MG capsule TAKE 1 CAPSULE NIGHTLY 12/28/21   KRIS Gan CNP   ONETOUCH VERIO strip USE TO TEST SUGAR TWICE A DAY 10/6/21   Jaime Muñoz MD   sertraline (ZOLOFT) 50 MG tablet TAKE 1 TABLET DAILY 7/14/21   Jaime Muñoz MD   Multiple Vitamins-Minerals (MULTIVITAMIN PO) Take by mouth daily    Historical Provider, MD   blood glucose monitor kit and supplies One Touch Verio Glucometer. Sig: test sugar daily. Dx: E11.9 10/15/19   Jaime Muñoz MD   docusate sodium (COLACE) 100 MG capsule Take 100 mg by mouth as needed     Historical Provider, MD   Lancets [facility] Lancet for Contour Next Lancing Device or per patient preference. Sig: test sugar BID.  Dx: E11.9 10/9/19   Anne Marie Gar MD   Red Yeast Rice Extract (RED YEAST RICE PO) Take 600 mg by mouth daily Taking 2 tabs daily    Historical Provider, MD   Omega-3 Fatty Acids (FISH OIL) 1000 MG CPDR Take 1,000 mg by mouth daily     Historical Provider, MD   sildenafil (VIAGRA) 100 MG tablet Take 1/2 to 1 tab as needed, use as instructed. 8/25/17   Anne Marie Gar MD   CPAP Machine MISC by Does not apply route Please change his Auto CPAP pressure settings to minimum pressure of 10cm H20 and maximum pressure of 14cm H20. 11/18/16   Aretha Sanchez MD   vitamin D (CHOLECALCIFEROL) 400 UNITS TABS tablet Take 400 Units by mouth daily    Historical Provider, MD   aspirin 81 MG EC tablet Take 81 mg by mouth daily. Historical Provider, MD       Allergies:  Patient has no known allergies. Social History:    Social History     Socioeconomic History    Marital status:      Spouse name: Not on file    Number of children: Not on file    Years of education: Not on file    Highest education level: Not on file   Occupational History     Employer: [employer]   Tobacco Use    Smoking status: Never Smoker    Smokeless tobacco: Never Used   Vaping Use    Vaping Use: Never used   Substance and Sexual Activity    Alcohol use: Yes     Alcohol/week: 0.0 standard drinks     Comment: occasional    Drug use: No    Sexual activity: Yes     Comment:    Other Topics Concern    Not on file   Social History Narrative    Not on file     Social Determinants of Health     Financial Resource Strain:     Difficulty of Paying Living Expenses: Not on file   Food Insecurity:     Worried About Running Out of Food in the Last Year: Not on file    Cynthia of Food in the Last Year: Not on file   Transportation Needs:     Lack of Transportation (Medical): Not on file    Lack of Transportation (Non-Medical):  Not on file   Physical Activity:     Days of Exercise per Week: Not on file    Minutes of Exercise per Session: Not on file   Stress:     Feeling of Stress : Not on file   Social Connections:     Frequency of Communication with Friends and Family: Not on file    Frequency of Social Gatherings with Friends and Family: Not on file    Attends Congregation Services: Not on file    Active Member of Clubs or Organizations: Not on file    Attends Club or Organization Meetings: Not on file    Marital Status: Not on file   Intimate Partner Violence:     Fear of Current or Ex-Partner: Not on file    Emotionally Abused: Not on file    Physically Abused: Not on file    Sexually Abused: Not on file   Housing Stability:     Unable to Pay for Housing in the Last Year: Not on file    Number of [place] in the Last Year: Not on file    Unstable Housing in the Last Year: Not on file       Family History:    Family History   Problem Relation Age of Onset    COPD Mother     Diabetes Father     Heart Attack Father     Other Father         Peritonitis       REVIEW OF SYSTEMS:  Constitutional: negative  Eyes: negative  Respiratory: negative  Cardiovascular: negative  Gastrointestinal: negative  Genitourinary: no acute issues  Musculoskeletal: negative  Skin: negative   Neurological: negative  Hematological/Lymphatic: negative  Psychological: negative    Physical Exam:      No data found. Constitutional: Patient in no acute distress; Neuro: alert and oriented to person place and time. Psych: Mood and affect normal.  Skin: Normal  Lungs: Respiratory effort normal, CTA  Cardiovascular:  Normal peripheral pulses; no murmur. Normal rhythm  Abdomen: Soft, non-tender, non-distended with no CVA, flank pain, hepatosplenomegaly or hernia. Kidneys normal.  Bladder non-tender and not distended. LABS:   No results for input(s): WBC, HGB, HCT, MCV, PLT in the last 72 hours. No results for input(s): NA, K, CL, CO2, PHOS, BUN, CREATININE, CA in the last 72 hours.   Lab Results   Component Value Date    PSA 5.29 (H) 10/21/2020    PSA 5.01 (H) 02/28/2019    PSA 5.49 (H) 09/26/2018         Urinalysis: No results for input(s): COLORU, PHUR, LABCAST, WBCUA, RBCUA, MUCUS, TRICHOMONAS, YEAST, BACTERIA, CLARITYU, SPECGRAV, LEUKOCYTESUR, UROBILINOGEN, BILIRUBINUR, BLOODU in the last 72 hours.     Invalid input(s): NITRATE, GLUCOSEUKETONESUAMORPHOUS     -----------------------------------------------------------------      Assessment and Plan     Impression:    Patient Active Problem List   Diagnosis    Depression    [facility]    Prostate hypertrophy    Type 2 diabetes mellitus without complication, without long-term current use of insulin ([facility]    Obstructive sleep apnea on CPAP    Morbid obesity with BMI of 40.0-44.9, adult ([facility]    Essential hypertension    Erectile dysfunction       Plan:     Consent obtained; cysto greenlight in OR today    Noel Garcia MD  6:36 AM 5/17/2022

## 2022-05-17 NOTE — ANESTHESIA PRE PROCEDURE
Department of Anesthesiology  Preprocedure Note       Name:  Markell Suresh   Age:  60 y.o.  :  1960                                          MRN:  [id number]         Date:  2022      Surgeon: Carleen Christina):  Jacqueline Kat MD    Procedure: Procedure(s):  CYSTO, GREENLIGHT PHOTOVAPORIZATION OF THE PROSTATE    Medications prior to admission:   Prior to Admission medications    Medication Sig Start Date End Date Taking? Authorizing Provider   hydroCHLOROthiazide (MICROZIDE) 12.5 MG capsule TAKE 1 CAPSULE DAILY 3/28/22   Daniel [facility], APRN - CNP   losartan (COZAAR) 100 MG tablet TAKE 1 TABLET DAILY 3/28/22   [facility]   tamsulosin [facility]) 0.4 MG capsule TAKE 1 CAPSULE NIGHTLY 21   KRIS Swanson CNP   ONETOUCH VERIO strip USE TO TEST SUGAR TWICE A DAY 10/6/21   Yaritza Oseguera MD   sertraline (ZOLOFT) 50 MG tablet TAKE 1 TABLET DAILY 21   Yaritza Oseguera MD   Multiple Vitamins-Minerals (MULTIVITAMIN PO) Take by mouth daily    Historical Provider, MD   blood glucose monitor kit and supplies One Touch Verio Glucometer. Sig: test sugar daily. Dx: E11.9 10/15/19   Yaritza Oseguera MD   docusate sodium (COLACE) 100 MG capsule Take 100 mg by mouth as needed     Historical Provider, MD   Lancets [facility] Lancet for Contour Next Lancing Device or per patient preference. Sig: test sugar BID. Dx: E11.9 10/9/19   Yaritza Oseguera MD   Red Yeast Rice Extract (RED YEAST RICE PO) Take 600 mg by mouth daily Taking 2 tabs daily    Historical Provider, MD   Omega-3 Fatty Acids (FISH OIL) 1000 MG CPDR Take 1,000 mg by mouth daily     Historical Provider, MD   sildenafil (VIAGRA) 100 MG tablet Take 1/2 to 1 tab as needed, use as instructed.  17   Yaritza Oseguera MD   CPAP Machine MISC by Does not apply route Please change his Auto CPAP pressure settings to minimum pressure of 10cm H20 and maximum pressure of 14cm H20. 16   Haven Cervantes MD   vitamin D (CHOLECALCIFEROL) 400 UNITS TABS tablet Take 400 Units by mouth daily    Historical Provider, MD   aspirin 81 MG EC tablet Take 81 mg by mouth daily. Historical Provider, MD       Current medications:    Current Facility-Administered Medications   Medication Dose Route Frequency Provider Last Rate Last Admin    ceFAZolin (ANCEF) 3000 mg in dextrose 5 % 100 mL IVPB  3,000 mg IntraVENous 30 Min Pre-Op Joslyn Christopher MD        0.9 % sodium chloride infusion   IntraVENous Continuous Joslyn Christopher MD           Allergies:  No Known Allergies    Problem List:    Patient Active Problem List   Diagnosis Code    Depression F32. 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) E11.9    Obstructive sleep apnea on CPAP G47.33, Z99.89    Morbid obesity with BMI of 40.0-44.9, adult ([facility]) E66.01, Z68.41    Essential hypertension I10    Erectile dysfunction N52.9       Past Medical History:        Diagnosis Date    Anxiety     Depression     Hypertension     Obesity     Type 2 diabetes mellitus without complication, without long-term current use of insulin ([facility])     Unspecified sleep apnea     Wears CPAP       Past Surgical History:        Procedure Laterality Date    ABDOMEN SURGERY      APPENDECTOMY      BACK SURGERY      Herniated disc lumbar--Dr. Laura Number.  CHOLECYSTECTOMY      COLONOSCOPY         Social History:    Social History     Tobacco Use    Smoking status: Never Smoker    Smokeless tobacco: Never Used   Substance Use Topics    Alcohol use:  Yes     Alcohol/week: 0.0 standard drinks     Comment: occasional                                Counseling given: Not Answered      Vital Signs (Current):   Vitals:    05/10/22 0925 05/10/22 0934 05/17/22 0735 05/17/22 0739   BP:   (!) 155/77    Pulse:   60    Resp:   20    Temp:   96.7 °F (35.9 °C)    TempSrc:   Temporal    SpO2:   98%    Weight: (!) 345 lb (156.5 kg) (!) 345 lb (156.5 kg)  (!) 341 lb (154.7 kg)   Height:    6' (1.829 m)                                              BP Readings from Last 3 Encounters:   05/17/22 (!) 155/77   04/01/22 (!) 154/88   03/10/22 (!) 140/73       NPO Status: Time of last liquid consumption: 2300 (sip of water with medication @ 0600 this am)                        Time of last solid consumption: 1800                        Date of last liquid consumption: 05/16/22                        Date of last solid food consumption: 05/16/22    BMI:   Wt Readings from Last 3 Encounters:   05/17/22 (!) 341 lb (154.7 kg)   04/01/22 (!) 347 lb (157.4 kg)   03/10/22 (!) 345 lb (156.5 kg)     Body mass index is 46.25 kg/m². CBC:   Lab Results   Component Value Date    WBC 7.4 05/04/2022    RBC 4.79 05/04/2022    HGB 14.1 05/04/2022    HCT 43.2 05/04/2022    MCV 90.2 05/04/2022    RDW 13.0 08/23/2017     05/04/2022       CMP:   Lab Results   Component Value Date     05/04/2022    K 4.3 05/04/2022    CL 98 05/04/2022    CO2 25 05/04/2022    BUN 19 05/04/2022    CREATININE 0.5 05/04/2022    LABGLOM >90 05/04/2022    GLUCOSE 163 05/04/2022    GLUCOSE 155 09/30/2019    PROT 7.7 05/04/2021    CALCIUM 9.8 05/04/2022    BILITOT 0.5 05/04/2021    BILITOT 0.5 09/21/2010    ALKPHOS 91 05/04/2021    AST 22 05/04/2021    ALT 29 05/04/2021       POC Tests: No results for input(s): POCGLU, POCNA, POCK, POCCL, POCBUN, POCHEMO, POCHCT in the last 72 hours.     Coags: No results found for: PROTIME, INR, APTT    HCG (If Applicable): No results found for: PREGTESTUR, PREGSERUM, HCG, HCGQUANT     ABGs: No results found for: PHART, PO2ART, CSW6OFB, VMQ3JCR, BEART, I0THIIOL     Type & Screen (If Applicable):  No results found for: LABABO, LABRH    Drug/Infectious Status (If Applicable):  Lab Results   Component Value Date    HEPCAB NEGATIVE 03/16/2018       COVID-19 Screening (If Applicable): No results found for: COVID19        Anesthesia Evaluation  Patient summary reviewed and Nursing notes reviewed no history of anesthetic complications:   Airway: Mallampati: III        Dental:          Pulmonary: breath sounds clear to auscultation  (+) sleep apnea: on CPAP,  decreased breath sounds,                             Cardiovascular:  Exercise tolerance: no interval change,   (+) hypertension:,       ECG reviewed  Rhythm: regular  Rate: normal                    Neuro/Psych:   (+) psychiatric history:            GI/Hepatic/Renal:   (+) morbid obesity          Endo/Other:    (+) Diabeteswell controlled, , .                 Abdominal:       Abdomen: soft. Vascular: Other Findings:             Anesthesia Plan      general     ASA 3       Induction: intravenous. MIPS: Postoperative opioids intended and Prophylactic antiemetics administered. Anesthetic plan and risks discussed with patient and spouse.                       [address] [facility],    5/17/2022

## 2022-05-17 NOTE — PROGRESS NOTES

## 2022-05-17 NOTE — ANESTHESIA POSTPROCEDURE EVALUATION
Department of Anesthesiology  Postprocedure Note    Patient: Chantelle Mike  MRN: [id number]  YOB: 1960  Date of evaluation: 5/17/2022  Time:  3:36 PM     Procedure Summary     Date: 05/17/22 Room / Location: [address] [place] [address] KONG Fuentes    Anesthesia Start: 8203 Anesthesia Stop: 0258    Procedure: Dolores Brass OF THE PROSTATE (N/A ) Diagnosis: (WEAK URINARY STREAM, BPH)    Surgeons: Vijay Rebolledo MD Responsible Provider: Phyllis Lopes DO    Anesthesia Type: general ASA Status: 3          Anesthesia Type: No value filed. Vincenzo Phase I: Vincenzo Score: 9    Vincenzo Phase II: Vincenzo Score: 10    Last vitals: Reviewed and per EMR flowsheets.        Anesthesia Post Evaluation    Patient location during evaluation: PACU  Patient participation: complete - patient participated  Level of consciousness: awake  Airway patency: patent  Nausea & Vomiting: no nausea  Complications: no  Cardiovascular status: hemodynamically stable  Respiratory status: acceptable  Hydration status: stable

## 2022-05-17 NOTE — PROGRESS NOTES
Pt admitted to [facility] room 19 and oriented to unit. SCD sleeves applied. Nares swabbed. Pt verbalized permission for first name, last initial and physicians name on white board. SDS board and discharge criteria explained, pt and family verbalized understanding. Pt denies thoughts of harming self or others. Call light in reach. Family at the bedside.

## 2022-05-17 NOTE — DISCHARGE INSTR - DIET

## 2022-05-17 NOTE — OP NOTE
[address] [place] [address]. [place]    DATE: 5/17/2022  Patient:  Lisa Zafar  MRN: [id number]  YOB: 1960    SURGEON: Sofía Senior MD.    ASSISTANT: none    PREOPERATIVE DIAGNOSIS: BPH with outlet obstruction    POSTOPERATIVE DIAGNOSIS: BPH with outlet obstruction    PROCEDURE PERFORMED:  Cystoscopy, Greenlight Photovaporization of Prostate,        ANESTHESIA: General    COMPLICATIONS: none    OR BLOOD LOSS:  Minimal    FLUIDS: Cystalloids per Anesthesia    SPECIMENS:  * No specimens in log *  none    DRAINS: [address]    INDICATIONS FOR PROCEDURE:  The patient is a 60 y.o. male who presents today with WEAK URINARY STREAM, BPH here for CYSTO, GREENLIGHT PHOTOVAPORIZATION OF THE PROSTATE. After risks, benefits and alternatives of the procedure were discussed with the patient, the patient elected to proceed. DETAILS OF PROCEDURE:  After informed consent was obtained in the preoperative area, the patient was taken back to the operating room and transferred to the operating table in supine position. EPC cuffs were placed. The machine was turned on. Anesthesia was induced and antibiotics were given. The patient was placed in modified dorsal lithotomy position and sterilely prepped and draped in a standard fashion. A timeout occurred. Two patient identifiers were used. The 25F rigid scope with the visual obturator was carefully advanced through the urethra. .  Once within the bladder the visual obturator was exchanged for the resection bridge. the ureteral orifices were very close to the bladder neck    The prostate was surveyed. the lateral lobes were noted to be significantly obstructing. . There was a median lobe present. Enucleation of the median lobe was performed. The vaporization was started proximal with a power setting of 80W.  Both the left and right lateral lobes were vaporized in their entirety down to the level of the surgical capsule up to a power of 180 ruelas. With this complete, the apical dissection was carried out delicately. All of the obstructing adenoma was vaporized. Exquisite care was taken to ensure the integrity of the urinary sphincter. Once completed, the sphincter was evaluated and found to be clear of the resection bed, and completely intact. The anterior adenoma was the last tissue to be addressed. The scope was rotated for ease of resection. One last evaluation of the prostatic urethra demonstrated complete vaporization of the gland to the surgical capsule circumferentially. The scope was advanced into the bladder. The ureteral orifices were re-surveyed and noted to be in pristine condition, free of laser scatter. The cystoscope was then removed, and a 22F 3-way Mendez catheter was placed into the bladder. When urine returned, the balloon was instilled with sterile water. The catheter was attached to gentle bladder irrigation using 0.9% normal saline. The catheter was fixed to the leg using a Bryon strap. The patient was then awakened and discharged back to the PACU in good and stable condition. Follow-Up: The bladder will be irrigated in the PACU.  As long as the urine remains clear, the irrigation port will be plugged and the patient will be discharged home with plans to follow for catheter removal.

## 2022-05-17 NOTE — PROGRESS NOTES
CBI discontinued at this time. Pt with  Pink tinged urine. No clots noted. Pt switched to leg bag. Wife and pt instructed on catheter care and how to change the bags. Verbalized understanding.

## 2022-05-17 NOTE — TELEPHONE ENCOUNTER
OK for refill.   ES
This was a shared visit with the PHOEBE. I reviewed and verified the documentation and independently performed the documented:

## 2022-05-17 NOTE — PROGRESS NOTES
1034 Pt arrives to recovery responsive to verbal stimulation. Pt respirations are unlabored on 4 L nasal canula. VSS. Pt denies any pain   1044 Pt status remains unchanged   1055 Pt denies any pain at this time. Pt respirations are unlabored on room air.  Pt VSS  1105 Pt meets criteria for discharge from recovery

## 2022-05-18 ENCOUNTER — NURSE ONLY (OUTPATIENT)
Dept: UROLOGY | Age: 62
End: 2022-05-18

## 2022-05-18 VITALS — HEIGHT: 72 IN | WEIGHT: 315 LBS | BODY MASS INDEX: 42.66 KG/M2

## 2022-05-18 DIAGNOSIS — N40.0 BENIGN PROSTATIC HYPERPLASIA, UNSPECIFIED WHETHER LOWER URINARY TRACT SYMPTOMS PRESENT: Primary | ICD-10-CM

## 2022-05-18 PROCEDURE — 99999 PR OFFICE/OUTPT VISIT,PROCEDURE ONLY: CPT | Performed by: UROLOGY

## 2022-05-18 NOTE — PROGRESS NOTES
Pt urine in leg bag is bloody, tomato juice-like in color. Per Dr Gia Lezama since no clots remove centeno. Patient has given me verbal consent to perform centeno removal  Yes    25 cc of water deflated from centeno balloon. 22 Fr centeno removed without difficulty. Foreskin reduced back down? Yes    Pt will drink fluids and call by 300pm if patient has not urinated. F/u with provider as scheduled.

## 2022-05-31 ENCOUNTER — SCHEDULED TELEPHONE ENCOUNTER (OUTPATIENT)
Dept: UROLOGY | Age: 62
End: 2022-05-31

## 2022-05-31 PROCEDURE — 99024 POSTOP FOLLOW-UP VISIT: CPT | Performed by: UROLOGY

## 2022-06-01 ENCOUNTER — TELEPHONE (OUTPATIENT)
Dept: UROLOGY | Age: 62
End: 2022-06-01

## 2022-06-01 DIAGNOSIS — N40.0 PROSTATE HYPERTROPHY: ICD-10-CM

## 2022-06-01 RX ORDER — TAMSULOSIN HYDROCHLORIDE 0.4 MG/1
CAPSULE ORAL
Qty: 90 CAPSULE | Refills: 3 | Status: SHIPPED | OUTPATIENT
Start: 2022-06-01 | End: 2022-07-28 | Stop reason: SDUPTHER

## 2022-06-01 NOTE — PROGRESS NOTES
Trey Pagan is a 60 y.o. male evaluated via telephone on 5/31/2022 for No chief complaint on file. .        Documentation:  I communicated with the patient and/or health care decision maker about bph. Details of this discussion including any medical advice provided:     S/p PVP  Voiding well  Some irritative symptoms      F/u three months    Total Time: minutes: 5-10 minutes    Trey Pagan was evaluated through a synchronous (real-time) audio encounter. Patient identification was verified at the start of the visit. He (or guardian if applicable) is aware that this is a billable service, which includes applicable co-pays. This visit was conducted with the patient's (and/or legal guardian's) verbal consent. He has not had a related appointment within my department in the past 7 days or scheduled within the next 24 hours. The patient was located at Home: [address] [place] [address]  [facility] [address] [id number]. The provider was located at [address]): [address] [place] [address] Rd  [address] [facility],  [address].     Note: not billable if this call serves to triage the patient into an appointment for the relevant concern    Fabián Houston MD

## 2022-06-01 NOTE — TELEPHONE ENCOUNTER
Pt had telephone visit with Dr. Jailyn Mak yesterday and forgot to ask for a refill of Flomax he takes 2x per day and it needs sent to [facility] please     Thank you

## 2022-06-20 ENCOUNTER — OFFICE VISIT (OUTPATIENT)
Dept: FAMILY MEDICINE CLINIC | Age: 62
End: 2022-06-20
Payer: COMMERCIAL

## 2022-06-20 VITALS
RESPIRATION RATE: 16 BRPM | BODY MASS INDEX: 45.6 KG/M2 | DIASTOLIC BLOOD PRESSURE: 74 MMHG | TEMPERATURE: 97.8 F | SYSTOLIC BLOOD PRESSURE: 130 MMHG | HEART RATE: 60 BPM | WEIGHT: 315 LBS

## 2022-06-20 DIAGNOSIS — E66.01 MORBID OBESITY WITH BMI OF 40.0-44.9, ADULT (HCC): ICD-10-CM

## 2022-06-20 DIAGNOSIS — N52.9 ERECTILE DYSFUNCTION, UNSPECIFIED ERECTILE DYSFUNCTION TYPE: ICD-10-CM

## 2022-06-20 DIAGNOSIS — E11.9 TYPE 2 DIABETES MELLITUS WITHOUT COMPLICATION, WITHOUT LONG-TERM CURRENT USE OF INSULIN (HCC): Primary | ICD-10-CM

## 2022-06-20 DIAGNOSIS — E78.5 DYSLIPIDEMIA, GOAL LDL BELOW 70: ICD-10-CM

## 2022-06-20 DIAGNOSIS — F32.A DEPRESSION, UNSPECIFIED DEPRESSION TYPE: ICD-10-CM

## 2022-06-20 DIAGNOSIS — I10 ESSENTIAL HYPERTENSION: ICD-10-CM

## 2022-06-20 PROCEDURE — 99214 OFFICE O/P EST MOD 30 MIN: CPT | Performed by: NURSE PRACTITIONER

## 2022-06-20 SDOH — ECONOMIC STABILITY: FOOD INSECURITY: WITHIN THE PAST 12 MONTHS, THE FOOD YOU BOUGHT JUST DIDN'T LAST AND YOU DIDN'T HAVE MONEY TO GET MORE.: NEVER TRUE

## 2022-06-20 SDOH — ECONOMIC STABILITY: FOOD INSECURITY: WITHIN THE PAST 12 MONTHS, YOU WORRIED THAT YOUR FOOD WOULD RUN OUT BEFORE YOU GOT MONEY TO BUY MORE.: NEVER TRUE

## 2022-06-20 ASSESSMENT — ENCOUNTER SYMPTOMS
DIARRHEA: 0
VOMITING: 0
COLOR CHANGE: 0
BACK PAIN: 0
COUGH: 0
WHEEZING: 0
EYE PAIN: 0
SHORTNESS OF BREATH: 0
ABDOMINAL PAIN: 0
SINUS PAIN: 0
FACIAL SWELLING: 0
SORE THROAT: 0
NAUSEA: 0
TROUBLE SWALLOWING: 0

## 2022-06-20 ASSESSMENT — PATIENT HEALTH QUESTIONNAIRE - PHQ9
4. FEELING TIRED OR HAVING LITTLE ENERGY: 0
SUM OF ALL RESPONSES TO PHQ QUESTIONS 1-9: 0
2. FEELING DOWN, DEPRESSED OR HOPELESS: 0
6. FEELING BAD ABOUT YOURSELF - OR THAT YOU ARE A FAILURE OR HAVE LET YOURSELF OR YOUR FAMILY DOWN: 0
SUM OF ALL RESPONSES TO PHQ QUESTIONS 1-9: 0
10. IF YOU CHECKED OFF ANY PROBLEMS, HOW DIFFICULT HAVE THESE PROBLEMS MADE IT FOR YOU TO DO YOUR WORK, TAKE CARE OF THINGS AT HOME, OR GET ALONG WITH OTHER PEOPLE: 0
8. MOVING OR SPEAKING SO SLOWLY THAT OTHER PEOPLE COULD HAVE NOTICED. OR THE OPPOSITE, BEING SO FIGETY OR RESTLESS THAT YOU HAVE BEEN MOVING AROUND A LOT MORE THAN USUAL: 0
SUM OF ALL RESPONSES TO PHQ9 QUESTIONS 1 & 2: 0
1. LITTLE INTEREST OR PLEASURE IN DOING THINGS: 0
9. THOUGHTS THAT YOU WOULD BE BETTER OFF DEAD, OR OF HURTING YOURSELF: 0
7. TROUBLE CONCENTRATING ON THINGS, SUCH AS READING THE NEWSPAPER OR WATCHING TELEVISION: 0
3. TROUBLE FALLING OR STAYING ASLEEP: 0
5. POOR APPETITE OR OVEREATING: 0

## 2022-06-20 ASSESSMENT — SOCIAL DETERMINANTS OF HEALTH (SDOH): HOW HARD IS IT FOR YOU TO PAY FOR THE VERY BASICS LIKE FOOD, HOUSING, MEDICAL CARE, AND HEATING?: NOT HARD AT ALL

## 2022-06-20 NOTE — PATIENT INSTRUCTIONS
Patient Education        Body Mass Index: Care Instructions  Your Care Instructions     Body mass index (BMI) can help you see if your weight is raising your risk for health problems. It uses a formula to compare how much you weigh with how tallyou are.  A BMI lower than 18.5 is considered underweight.  A BMI between 18.5 and 24.9 is considered healthy.  A BMI between 25 and 29.9 is considered overweight. A BMI of 30 or higher is considered obese. If your BMI is in the normal range, it means that you have a lower risk for weight-related health problems. If your BMI is in the overweight or obese range, you may be at increased risk for weight-related health problems, such as high blood pressure, heart disease, stroke, arthritis or joint pain, and diabetes. If your BMI is in the underweight range, you may be at increased risk for health problems such as fatigue, lower protection (immunity) againstillness, muscle loss, bone loss, hair loss, and hormone problems. BMI is just one measure of your risk for weight-related health problems. You may be at higher risk for health problems if you are not active, you eat anunhealthy diet, or you drink too much alcohol or use tobacco products. Follow-up care is a key part of your treatment and safety. Be sure to make and go to all appointments, and call your doctor if you are having problems. It's also a good idea to know your test results and keep alist of the medicines you take. How can you care for yourself at home?  Practice healthy eating habits. This includes eating plenty of fruits, vegetables, whole grains, lean protein, and low-fat dairy.  If your doctor recommends it, get more exercise. Walking is a good choice. Bit by bit, increase the amount you walk every day. Try for at least 30 minutes on most days of the week.  Do not smoke. Smoking can increase your risk for health problems.  If you need help quitting, talk to your doctor about stop-smoking programs and medicines. These can increase your chances of quitting for good.  Limit alcohol to 2 drinks a day for men and 1 drink a day for women. Too much alcohol can cause health problems. If you have a BMI higher than 25   Your doctor may do other tests to check your risk for weight-related health problems. This may include measuring the distance around your waist. A waist measurement of more than 40 inches in men or 35 inches in women can increase the risk of weight-related health problems.  Talk with your doctor about steps you can take to stay healthy or improve your health. You may need to make lifestyle changes to lose weight and stay healthy, such as changing your diet and getting regular exercise. If you have a BMI lower than 18.5   Your doctor may do other tests to check your risk for health problems.  Talk with your doctor about steps you can take to stay healthy or improve your health. You may need to make lifestyle changes to gain or maintain weight and stay healthy, such as getting more healthy foods in your diet and doing exercises to build muscle. Where can you learn more? Go to https://[employer]AQS. org and sign in to your [employer] account. Enter S176 in the [facility] box to learn more about \"Body Mass Index: Care Instructions. \"     If you do not have an account, please click on the \"Sign Up Now\" link. Current as of: December 27, 2021               Content Version: 13.2  © 2006-2022 Healthwise, Incorporated. Care instructions adapted under license by [facility] ([facility]). If you have questions about a medical condition or this instruction, always ask your healthcare professional. [place] [address] any warranty or liability for your use of this information. Patient Education        Learning About Meal Planning for Diabetes  Why plan your meals? Meal planning can be a key part of managing diabetes.  Planning meals and snacks with the right balance of carbohydrate, protein, and fat can help you keep yourblood sugar at the target level you set with your doctor. You don't have to eat special foods. You can eat what your family eats, including sweets once in a while. But you do have to pay attention to how oftenyou eat and how much you eat of certain foods. You may want to work with a dietitian or a diabetes educator. They can give you tips and meal ideas and can answer your questions about meal planning. This health professional can also help you reach a healthy weight if that is one ofyour goals. What plan is right for you? Your dietitian or diabetes educator may suggest that you start with the plateformat or carbohydrate counting. The plate format  The plate format is a simple way to help you manage how you eat. You plan meals by learning how much space each food should take on a plate. Using the plate format helps you manage the amount of carbohydrate you eat. It can make it easier to keep your blood sugar level within your target range. It also helpsyou see if you're eating healthy portion sizes. To use the plate format, you put non-starchy vegetables on half your plate. Add lean protein foods, such as fish, lean meats and poultry, or soy products, on one-quarter of the plate. Put a grain or starchy vegetable (such as brown rice or a potato) on the final quarter of the plate. You can add a small piece of fruit and some low-fat or fat-free milk or yogurt, depending on yourcarbohydrate goal for each meal.  Here are some tips for using the plate format:   Make sure that you are not using an oversized plate. A 9-inch plate is best. Many restaurants use larger plates.  Get used to using the plate format at home. Then you can use it when you eat out.  Write down your questions about using the plate format. Talk to your doctor, a dietitian, or a diabetes educator about your concerns.   Carbohydrate counting  With carbohydrate counting, you plan meals based on the amount of carbohydrate in each food. Carbohydrate raises blood sugar higher and more quickly than any other nutrient. It is found in desserts, breads and cereals, and fruit. It's also found in starchy vegetables such as potatoes and corn, grains such as rice and pasta, and milk and yogurt. You can help keep your blood sugar levels within your target range by planning how much carbohydrate to have at meals andsnacks. The amount you need depends on several things. These include your weight, how active you are, which diabetes medicines you take, and what your goals are for your blood sugar levels. A registered dietitian or diabetes educator can helpyou plan how much carbohydrate to include in each meal and snack. An example of a carbohydrate counting plan is:   45 to 60 grams at each meal. That's about the same as 3 to 4 carbohydrate servings.  15 to 20 grams at each snack. That's about the same as 1 carbohydrate serving. The Nutrition Facts label on packaged foods tells you how much carbohydrate is in a serving of the food. First, look at the serving size on the food label. Is that the amount you eat in a serving? All of the nutrition information on a food label is based on that serving size. So if you eat more or less than that, you'll need to adjust the other numbers. Total carbohydrate is the next thing you need to look for on the label. If you count carbohydrate servings, oneserving of carbohydrate is 15 grams. For foods that don't come with labels, such as fresh fruits and vegetables, you'll need a guide that lists carbohydrate in these foods. Ask your doctor, dietitian, or diabetes educator about books or other nutrition guides you canuse. If you take insulin, you need to know how many grams of carbohydrate are in a meal. This lets you know how much rapid-acting insulin to take before you eat. If you use an insulin pump, you get a constant rate of insulin during the day.  So the pump must be programmed at meals to give you extra insulin to cover therise in blood sugar after meals. When you know how much carbohydrate you will eat, you can take the right amount of insulin. Or, if you always use the same amount of insulin, you need to [facility] that you eat the same amount of carbohydrate at meals. If you need more help to understand carbohydrate counting and food labels, askyour doctor, dietitian, or diabetes educator. How can you plan healthy meals? Here are some tips to get started:  [address] your meals a week at a time. Don't forget to include snacks too.  Use cookbooks or online recipes to plan several main meals. Plan some quick meals for busy nights. You also can double some recipes that freeze well. Then you can save half for other busy nights when you don't have time to cook.  Make sure you have the ingredients you need for your recipes. If you're running low on basic items, put these items on your shopping list too.  List foods that you use to make breakfasts, lunches, and snacks. List plenty of fruits and vegetables.  Post this list on the refrigerator. Add to it as you think of more things you need.  Take the list to the store to do your weekly shopping. Follow-up care is a key part of your treatment and safety. Be sure to make and go to all appointments, and call your doctor if you are having problems. It's also a good idea to know your test results and keep alist of the medicines you take. Where can you learn more? Go to https://lisseth.[employer]. org and sign in to your [employer] account. Enter C695 in the [employer] box to learn more about \"Learning About Meal Planning for Diabetes. \"     If you do not have an account, please click on the \"Sign Up Now\" link. Current as of: September 8, 2021               Content Version: 13.2  © 3586-2313 Healthwise, Incorporated. Care instructions adapted under license by [facility] ([facility]).  If you have questions about a medical condition or this instruction, always ask your healthcare professional. [place] [address] any warranty or liability for your use of this information.

## 2022-06-20 NOTE — PROGRESS NOTES
[facility]  [address] [address] [address]  Dept: [phone]  Dept Fax: [phone]  Loc: [phone]     2022     Rut Quiroz (:  1960) is a 60 y.o. male, here for evaluation of the following medical concerns:    Chief Complaint   Patient presents with    6 Month Follow-Up     Pt did not get blood work done, no concerns at this time. Pt is due for a diabetic foot exam.          Pt presents for follow up of T2DM, HTN, ED, GEM, BPH, Depression, Morbid Obesity.      Pt doing well at this time- denies any new complaints. Treatment Adherence:   Medication compliance:  compliant all of the time  Diet compliance:  compliant most of the time  Weight trend: stable  Current exercise: no regular exercise  Barriers: impairment:  physical: recent surgery. Diabetes Mellitus Type 2: Current symptoms/problems include none. Home blood sugar records: fasting range: 130's  Any episodes of hypoglycemia? no  Eye exam current (within one year): no  Tobacco history: He  reports that he has never smoked. He has never used smokeless tobacco.   Daily Aspirin? Yes    Hypertension:  Home blood pressure monitoring: Yes - 130's/70's. He is adherent to a low sodium diet. Patient denies chest pain, headache and lightheadedness. Antihypertensive medication side effects: no medication side effects noted. Use of agents associated with hypertension: none.      Hyperlipidemia:  No new myalgias or GI upset on OTC Fish Oil and red yeast. Pt declines statins in the past. .       Lab Results   Component Value Date    LABA1C 6.9 (H) 2021    LABA1C 6.5 (H) 10/21/2020    LABA1C 6.4 2020     Lab Results   Component Value Date    LABMICR 2.05 2021    CREATININE 0.5 2022     Lab Results   Component Value Date    ALT 29 2021    AST 22 2021     Lab Results   Component Value Date    CHOL 175 2021    TRIG 111 2021 HDL 51 05/04/2021    LDLCALC 102 05/04/2021    LDLDIRECT 110.54 10/21/2020      The 10-year ASCVD risk score (Amelia Rutherford, et al., 2013) is: 21.3%    Values used to calculate the score:      Age: 60 years      Sex: Male      Is Non- : No      Diabetic: Yes      Tobacco smoker: No      Systolic Blood Pressure: 921 mmHg      Is BP treated: Yes      HDL Cholesterol: 51 mg/dL      Total Cholesterol: 175 mg/dL    Patient Active Problem List   Diagnosis    Depression    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 HENT: Negative for congestion, facial swelling, sinus pain, sore throat and trouble swallowing. Eyes: Negative for pain and visual disturbance. Respiratory: Negative for cough, shortness of breath and wheezing. Cardiovascular: Negative for chest pain and palpitations. Gastrointestinal: Negative for abdominal pain, diarrhea, nausea and vomiting. Genitourinary: Negative for difficulty urinating, dysuria and urgency. Musculoskeletal: Negative for back pain, gait problem and neck pain. Skin: Negative for color change and rash. Neurological: Negative for dizziness, weakness and headaches. Psychiatric/Behavioral: Negative for agitation and sleep disturbance. The patient is not nervous/anxious. Prior to Visit Medications    Medication Sig Taking?  Authorizing Provider   tamsulosin (FLOMAX) 0.4 mg capsule TAKE 1 CAPSULE NIGHTLY Yes KRIS Adair CNP   hydroCHLOROthiazide (MICROZIDE) 12.5 MG capsule TAKE 1 CAPSULE DAILY Yes KRIS Kinney CNP   losartan (COZAAR) 100 MG tablet TAKE 1 TABLET DAILY Yes KRIS Kinney CNP ONETOUCH VERIO strip USE TO TEST SUGAR TWICE A DAY Yes Melyssa Pierce MD   sertraline (ZOLOFT) 50 MG tablet TAKE 1 TABLET DAILY Yes Melyssa Pierce MD   Multiple Vitamins-Minerals (MULTIVITAMIN PO) Take by mouth daily  Yes Historical Provider, MD   blood glucose monitor kit and supplies One Touch Verio Glucometer. Sig: test sugar daily. Dx: E11.9 Yes Melyssa Pierce MD   docusate sodium (COLACE) 100 MG capsule Take 100 mg by mouth as needed  Yes Historical Provider, MD   Lancets [facility] Lancet for Contour Next Lancing Device or per patient preference. Sig: test sugar BID. Dx: E11.9 Yes Melyssa Pierce MD   Red Yeast Rice Extract (RED YEAST RICE PO) Take 600 mg by mouth daily Taking 2 tabs daily Yes Historical Provider, MD   sildenafil (VIAGRA) 100 MG tablet Take 1/2 to 1 tab as needed, use as instructed. Yes Melyssa Pierce MD   CPAP Machine MISC by Does not apply route Please change his Auto CPAP pressure settings to minimum pressure of 10cm H20 and maximum pressure of 14cm H20. Yes Debby Camilo MD   vitamin D (CHOLECALCIFEROL) 400 UNITS TABS tablet Take 400 Units by mouth daily Yes Historical Provider, MD   aspirin 81 MG EC tablet Take 81 mg by mouth daily. Yes Historical Provider, MD   Omega-3 Fatty Acids (FISH OIL) 1000 MG CPDR Take 1,000 mg by mouth daily   Patient not taking: Reported on 6/20/2022  Historical Provider, MD        Social History     Tobacco Use    Smoking status: Never Smoker    Smokeless tobacco: Never Used   Substance Use Topics    Alcohol use: Yes     Alcohol/week: 0.0 standard drinks     Comment: occasional        Vitals:    06/20/22 0759 06/20/22 0818   BP: (!) 150/72 130/74   Pulse: 60    Resp: 16    Temp: 97.8 °F (36.6 °C)    TempSrc: Oral    Weight: (!) 336 lb 3.2 oz (152.5 kg)      Estimated body mass index is 45.6 kg/m² as calculated from the following:    Height as of 5/18/22: 6' (1.829 m). Weight as of this encounter: 336 lb 3.2 oz (152.5 kg).     Physical Exam  Vitals reviewed. Constitutional:       General: He is not in acute distress. Appearance: Normal appearance. He is well-developed. HENT:      Head: Normocephalic and atraumatic. Right Ear: Hearing, tympanic membrane, ear canal and external ear normal.      Left Ear: Hearing, tympanic membrane, ear canal and external ear normal.      Nose: Nose normal. No nasal tenderness. Mouth/Throat:      Lips: Pink. Mouth: Mucous membranes are moist. No oral lesions. Pharynx: Oropharynx is clear. Uvula midline. Eyes:      General:         Right eye: No discharge. Left eye: No discharge. Conjunctiva/sclera: Conjunctivae normal.   Neck:      Vascular: No carotid bruit. Trachea: No tracheal deviation. Cardiovascular:      Rate and Rhythm: Normal rate and regular rhythm. Heart sounds: Normal heart sounds. No murmur heard. Pulmonary:      Effort: Pulmonary effort is normal. No respiratory distress. Breath sounds: Normal breath sounds. Abdominal:      General: Bowel sounds are normal.      Palpations: Abdomen is soft. Tenderness: There is no abdominal tenderness. Musculoskeletal:      Cervical back: Full passive range of motion without pain and neck supple. Right lower leg: No edema. Left lower leg: No edema. Lymphadenopathy:      Head:      Right side of head: No submental, submandibular, tonsillar, preauricular, posterior auricular or occipital adenopathy. Left side of head: No submental, submandibular, tonsillar, preauricular, posterior auricular or occipital adenopathy. Cervical: No cervical adenopathy. Skin:     General: Skin is warm and dry. Findings: No rash. Neurological:      General: No focal deficit present. Mental Status: He is alert and oriented to person, place, and time.       Coordination: Coordination normal.   Psychiatric:         Mood and Affect: Mood normal.         Behavior: Behavior normal.

## 2022-07-01 ENCOUNTER — TELEPHONE (OUTPATIENT)
Dept: UROLOGY | Age: 62
End: 2022-07-01

## 2022-07-01 ENCOUNTER — HOSPITAL ENCOUNTER (OUTPATIENT)
Age: 62
Discharge: HOME | End: 2022-07-01
Payer: COMMERCIAL

## 2022-07-01 DIAGNOSIS — R30.0 DYSURIA: ICD-10-CM

## 2022-07-01 DIAGNOSIS — R39.15 URGENCY OF URINATION: ICD-10-CM

## 2022-07-01 DIAGNOSIS — R39.15 URGENCY OF URINATION: Primary | ICD-10-CM

## 2022-07-01 LAB
BACTERIA: ABNORMAL
BILIRUBIN URINE: NEGATIVE
BLOOD, URINE: ABNORMAL
CASTS: ABNORMAL /LPF
CASTS: ABNORMAL /LPF
CHARACTER, URINE: CLEAR
COLOR: YELLOW
CRYSTALS: ABNORMAL
EPITHELIAL CELLS, UA: ABNORMAL /HPF
GLUCOSE, URINE: NEGATIVE MG/DL
KETONES, URINE: NEGATIVE
LEUKOCYTE ESTERASE, URINE: ABNORMAL
MISCELLANEOUS LAB TEST RESULT: ABNORMAL
NITRITE, URINE: NEGATIVE
PH UA: 5.5 (ref 5–9)
PROTEIN UA: ABNORMAL MG/DL
RBC URINE: ABNORMAL /HPF
RENAL EPITHELIAL, UA: ABNORMAL
SPECIFIC GRAVITY UA: 1.01 (ref 1–1.03)
UROBILINOGEN, URINE: 0.2 EU/DL (ref 0–1)
WBC UA: ABNORMAL /HPF
YEAST: ABNORMAL

## 2022-07-01 PROCEDURE — 87086 URINE CULTURE/COLONY COUNT: CPT

## 2022-07-01 PROCEDURE — 81001 URINALYSIS AUTO W/SCOPE: CPT

## 2022-07-01 PROCEDURE — 87077 CULTURE AEROBIC IDENTIFY: CPT

## 2022-07-01 NOTE — TELEPHONE ENCOUNTER
Patient feels like he is emptying his bladder and declined the appointment. He will call the office for any other questions or concerns and gave the urine sample to the lab.

## 2022-07-01 NOTE — TELEPHONE ENCOUNTER
Pt's can have some increased irritative voiding symptoms, frequency, urgency following PVP for several weeks. Recommend checking urinalysis and culture to assure no component of infection contributing. If having any concern for impaired emptying we can have him come in for a pvr check.

## 2022-07-03 LAB
ORGANISM: ABNORMAL
URINE CULTURE, ROUTINE: ABNORMAL
URINE CULTURE, ROUTINE: ABNORMAL

## 2022-07-04 RX ORDER — CEPHALEXIN 500 MG/1
500 CAPSULE ORAL 3 TIMES DAILY
Qty: 21 CAPSULE | Refills: 0 | Status: SHIPPED | OUTPATIENT
Start: 2022-07-04 | End: 2022-07-11

## 2022-07-05 ENCOUNTER — TELEPHONE (OUTPATIENT)
Dept: UROLOGY | Age: 62
End: 2022-07-05

## 2022-07-27 ENCOUNTER — OFFICE VISIT (OUTPATIENT)
Dept: PULMONOLOGY | Age: 62
End: 2022-07-27
Payer: COMMERCIAL

## 2022-07-27 VITALS
HEIGHT: 72 IN | OXYGEN SATURATION: 96 % | HEART RATE: 64 BPM | TEMPERATURE: 98 F | BODY MASS INDEX: 42.66 KG/M2 | SYSTOLIC BLOOD PRESSURE: 136 MMHG | DIASTOLIC BLOOD PRESSURE: 82 MMHG | WEIGHT: 315 LBS

## 2022-07-27 DIAGNOSIS — E66.01 MORBID OBESITY WITH BMI OF 45.0-49.9, ADULT (HCC): ICD-10-CM

## 2022-07-27 DIAGNOSIS — G47.33 OBSTRUCTIVE SLEEP APNEA ON CPAP: Primary | ICD-10-CM

## 2022-07-27 DIAGNOSIS — Z99.89 OBSTRUCTIVE SLEEP APNEA ON CPAP: Primary | ICD-10-CM

## 2022-07-27 PROCEDURE — 99213 OFFICE O/P EST LOW 20 MIN: CPT | Performed by: PHYSICIAN ASSISTANT

## 2022-07-27 ASSESSMENT — ENCOUNTER SYMPTOMS
ALLERGIC/IMMUNOLOGIC NEGATIVE: 1
CHEST TIGHTNESS: 0
STRIDOR: 0
WHEEZING: 0
SHORTNESS OF BREATH: 0
NAUSEA: 0
DIARRHEA: 0
EYES NEGATIVE: 1
BACK PAIN: 0
COUGH: 0

## 2022-07-27 NOTE — PROGRESS NOTES
[facility]      Asya Ortiz         [id number]  7/27/2022   Chief Complaint   Patient presents with    Follow-up     1 year GEM Follow up         Pt of Dr. Evelin Arteaga    PAP Download:   Original or initial AHI: 20     Date of initial study: 03/01/2000      Compliant  100%     Noncompliant 0 %     PAP Type Auto   Level  10/14 cmH2O    Avg Hrs/Day 6 hours 48 minutes   AHI: 0.7   Recorded compliance dates , 06/26/2022 to 07/25/2022  Machine/Mfg:   [x] ResMed    [] Respironics/Dreamstation   Interface:   [] Nasal    [] Nasal pillows   [x] FFM      Provider:      [x] [facility]     [][facility]     [] [employer]    [] [place]    [] [place]               [] [facility]      [] [employer]    [] [address]      [] Other    Neck Size: 20  Mallampati Mallampati 2  ESS:  1  SAQLI: 94    Here is a scan of the most recent download:            Presentation:   Mino Wood presents for sleep medicine follow up for obstructive sleep apnea  Since the last visit, Mino Wood is doing well with PAP. New mask is fitting better. No complaints. Sleeps well and feels rested. Equipment issues: The pressure is  acceptable, the mask is acceptable     Sleep issues:  Do you feel better? Yes  More rested? Yes   Better concentration? yes    Progress History:   Since last visit any new medical issues? Yes prostate surgery  New ER or hospital visits? No  Any new or changes in medicines? No  Any new sleep medicines? No    Review of Systems -   Review of Systems   Constitutional:  Negative for activity change, appetite change, chills and fever. HENT:  Negative for congestion and postnasal drip. Eyes: Negative. Respiratory:  Negative for cough, chest tightness, shortness of breath, wheezing and stridor. Cardiovascular:  Negative for chest pain and leg swelling. Gastrointestinal:  Negative for diarrhea and nausea. Endocrine: Negative. Genitourinary: Negative. Musculoskeletal: Negative.   Negative for arthralgias and back pain. Skin: Negative. Allergic/Immunologic: Negative. Neurological: Negative. Negative for dizziness and light-headedness. Psychiatric/Behavioral: Negative. All other systems reviewed and are negative. Physical Exam:    BMI:  Body mass index is 46.38 kg/m². Wt Readings from Last 3 Encounters:   07/27/22 (!) 342 lb (155.1 kg)   06/20/22 (!) 336 lb 3.2 oz (152.5 kg)   05/18/22 (!) 341 lb (154.7 kg)     Weight stable / unchanged  Vitals: /82   Pulse 64   Temp 98 °F (36.7 °C)   Ht 6' (1.829 m)   Wt (!) 342 lb (155.1 kg)   SpO2 96% Comment: r/a  BMI 46.38 kg/m²       Physical Exam  Constitutional:       Appearance: Normal appearance. He is normal weight. HENT:      Head: Normocephalic and atraumatic. Right Ear: External ear normal.      Left Ear: External ear normal.      Nose: Nose normal.   Eyes:      Extraocular Movements: Extraocular movements intact. Conjunctiva/sclera: Conjunctivae normal.      Pupils: Pupils are equal, round, and reactive to light. Pulmonary:      Effort: Pulmonary effort is normal.   Musculoskeletal:      Cervical back: Normal range of motion and neck supple. Neurological:      General: No focal deficit present. Mental Status: He is alert and oriented to person, place, and time. Psychiatric:         Attention and Perception: Attention and perception normal.         Mood and Affect: Mood and affect normal.         Speech: Speech normal.         Behavior: Behavior normal. Behavior is cooperative. Thought Content: Thought content normal.         Cognition and Memory: Cognition normal.         Judgment: Judgment normal.         ASSESSMENT/DIAGNOSIS     Diagnosis Orders   1. Obstructive sleep apnea on CPAP        2. Morbid obesity with BMI of 45.0-49.9, adult [facility])                 Plan   Do you need any equipment today?  Yes update supplies  - Download reviewed and discussed with patient  - He  was advised to continue current positive airway pressure therapy with above described pressure. - He  advised to keep good compliance with current recommended pressure to get optimal results and clinical improvement  - Recommend 7-9 hours of sleep with PAP  - He was advised to call [employer] company regarding supplies if needed.   -He call my office for earlier appointment if needed for worsening of sleep symptoms.   - He was instructed on weight loss  - Keily Barnett was educated about my impression and plan. Patient verbalizesunderstanding.   We will see Jese Barahona back in: 1 year with download    Information added by my medical assistant/LPN was reviewed today         Ariel Schilder PA-C, MPAS  7/27/2022

## 2022-07-28 DIAGNOSIS — N40.0 PROSTATE HYPERTROPHY: ICD-10-CM

## 2022-07-28 RX ORDER — TAMSULOSIN HYDROCHLORIDE 0.4 MG/1
CAPSULE ORAL
Qty: 90 CAPSULE | Refills: 3 | Status: SHIPPED | OUTPATIENT
Start: 2022-07-28 | End: 2022-08-01 | Stop reason: SDUPTHER

## 2022-07-28 NOTE — TELEPHONE ENCOUNTER
Oanh Peraza called requesting a refill on the following medications:  Requested Prescriptions     Pending Prescriptions Disp Refills    tamsulosin (FLOMAX) 0.4 MG capsule 90 capsule 3     Sig: TAKE 1 CAPSULE NIGHTLY     [facility]   . pv      Date of last visit:   Date of next visit (if applicable): Visit date not found

## 2022-07-28 NOTE — TELEPHONE ENCOUNTER
Pt called in regarding how many pills to take for Flomax. He has seen Sheryle Vinay in the past and he was told to take 2 pills for Flomax. Dr. Delmy Blankenship is now seeing him and the prescription for the Flomax says 1 nightly. Patient would like to know if he should be taking 1 or 2 pills at night. Please advise.

## 2022-07-28 NOTE — TELEPHONE ENCOUNTER
Called pt back, advised him he can take 1 or 2 pill of Flomax at night. Whatever worked better for him.

## 2022-08-01 ENCOUNTER — TELEPHONE (OUTPATIENT)
Dept: UROLOGY | Age: 62
End: 2022-08-01

## 2022-08-01 DIAGNOSIS — N40.0 PROSTATE HYPERTROPHY: ICD-10-CM

## 2022-08-01 RX ORDER — TAMSULOSIN HYDROCHLORIDE 0.4 MG/1
CAPSULE ORAL
Qty: 30 CAPSULE | Refills: 0 | Status: SHIPPED | OUTPATIENT
Start: 2022-08-01 | End: 2022-08-12

## 2022-08-01 NOTE — TELEPHONE ENCOUNTER
Can short supply of flomax be sent to [facility]? [facility] won't ship until 08/04/2022 and he is out now. Please advise.  Thank you

## 2022-08-11 ENCOUNTER — TELEPHONE (OUTPATIENT)
Dept: UROLOGY | Age: 62
End: 2022-08-11

## 2022-08-11 ENCOUNTER — HOSPITAL ENCOUNTER (OUTPATIENT)
Age: 62
Discharge: HOME | DRG: 690 | End: 2022-08-11
Payer: COMMERCIAL

## 2022-08-11 DIAGNOSIS — R30.0 DYSURIA: Primary | ICD-10-CM

## 2022-08-11 DIAGNOSIS — R30.0 DYSURIA: ICD-10-CM

## 2022-08-11 LAB
AMORPHOUS: ABNORMAL
BACTERIA: ABNORMAL
BILIRUBIN URINE: ABNORMAL
BLOOD, URINE: ABNORMAL
CASTS: ABNORMAL /LPF
CASTS: ABNORMAL /LPF
CHARACTER, URINE: ABNORMAL
COLOR: ABNORMAL
CRYSTALS: ABNORMAL
CRYSTALS: ABNORMAL
EPITHELIAL CELLS, UA: ABNORMAL /HPF
GLUCOSE, URINE: 100 MG/DL
ICTOTEST: NEGATIVE
KETONES, URINE: ABNORMAL
LEUKOCYTE ESTERASE, URINE: ABNORMAL
MISCELLANEOUS LAB TEST RESULT: ABNORMAL
MISCELLANEOUS LAB TEST RESULT: ABNORMAL
MUCUS: ABNORMAL
NITRITE, URINE: POSITIVE
PH UA: 5 (ref 5–9)
PROTEIN UA: >= 300 MG/DL
RBC URINE: > 200 /HPF
RENAL EPITHELIAL, UA: ABNORMAL
SPECIFIC GRAVITY UA: 1.02 (ref 1–1.03)
UROBILINOGEN, URINE: 1 EU/DL (ref 0–1)
WBC UA: ABNORMAL /HPF
YEAST: ABNORMAL

## 2022-08-11 PROCEDURE — 81001 URINALYSIS AUTO W/SCOPE: CPT

## 2022-08-11 PROCEDURE — 87086 URINE CULTURE/COLONY COUNT: CPT

## 2022-08-11 RX ORDER — CIPROFLOXACIN 500 MG/1
500 TABLET, FILM COATED ORAL 2 TIMES DAILY
Qty: 20 TABLET | Refills: 0 | Status: ON HOLD | OUTPATIENT
Start: 2022-08-11 | End: 2022-08-15 | Stop reason: HOSPADM

## 2022-08-11 NOTE — TELEPHONE ENCOUNTER
Patient stopped the aspirin last night. He only takes it for heart healthy. He voiced understanding to start the cipro and to be evaluated in the ER if the hematuria gets worse or unable to urinate.  Appointment changed

## 2022-08-11 NOTE — TELEPHONE ENCOUNTER
Patient passes some flecks of tissue \"scabs\" and started to have hematuria Wednesday. The urine is a little lighter than cherry red today. He stated having urgency and frequency last night with some burning. He denies fever or chills. Order placed for urine    Patient underwent  Cystoscopy, Greenlight Photovaporization of Prostate on 05/17/2022. Last treated for UTI on 07/04/2022 with keflex. Please advise.  Thank you

## 2022-08-11 NOTE — TELEPHONE ENCOUNTER
Check ua and culture. Increase oral fluid intake. Start Cipro empirically  MAR shows pt is taking asa daily--why is pt on asa and can this be held for a few days until hematuria improves? Please move up appt with Dr. Danya Alcazar with [facility].   (ASAP)  If hematuria worsens or pt unable to urinate he needs to present to ER for evaluation

## 2022-08-12 ENCOUNTER — HOSPITAL ENCOUNTER (INPATIENT)
Age: 62
LOS: 3 days | Discharge: HOME | DRG: 690 | End: 2022-08-15
Attending: EMERGENCY MEDICINE | Admitting: INTERNAL MEDICINE
Payer: COMMERCIAL

## 2022-08-12 DIAGNOSIS — R33.9 URINARY RETENTION: Primary | ICD-10-CM

## 2022-08-12 DIAGNOSIS — N40.0 PROSTATE HYPERTROPHY: ICD-10-CM

## 2022-08-12 DIAGNOSIS — R31.9 HEMATURIA, UNSPECIFIED TYPE: ICD-10-CM

## 2022-08-12 LAB
ALBUMIN SERPL-MCNC: 4.2 G/DL (ref 3.5–5.1)
ALP BLD-CCNC: 92 U/L (ref 38–126)
ALT SERPL-CCNC: 21 U/L (ref 11–66)
ANION GAP SERPL CALCULATED.3IONS-SCNC: 13 MEQ/L (ref 8–16)
APTT: 34.9 SECONDS (ref 22–38)
AST SERPL-CCNC: 20 U/L (ref 5–40)
BACTERIA: ABNORMAL
BASOPHILS # BLD: 0.3 %
BASOPHILS ABSOLUTE: 0 THOU/MM3 (ref 0–0.1)
BILIRUB SERPL-MCNC: 0.6 MG/DL (ref 0.3–1.2)
BILIRUBIN DIRECT: < 0.2 MG/DL (ref 0–0.3)
BILIRUBIN URINE: NEGATIVE
BLOOD, URINE: ABNORMAL
BUN BLDV-MCNC: 10 MG/DL (ref 7–22)
CALCIUM SERPL-MCNC: 9.9 MG/DL (ref 8.5–10.5)
CASTS: ABNORMAL /LPF
CHARACTER, URINE: ABNORMAL
CHLORIDE BLD-SCNC: 98 MEQ/L (ref 98–111)
CO2: 24 MEQ/L (ref 23–33)
COLOR: ABNORMAL
CREAT SERPL-MCNC: 0.6 MG/DL (ref 0.4–1.2)
CRYSTALS: ABNORMAL
EOSINOPHIL # BLD: 0.1 %
EOSINOPHILS ABSOLUTE: 0 THOU/MM3 (ref 0–0.4)
EPITHELIAL CELLS, UA: ABNORMAL /HPF
ERYTHROCYTE [DISTWIDTH] IN BLOOD BY AUTOMATED COUNT: 12 % (ref 11.5–14.5)
ERYTHROCYTE [DISTWIDTH] IN BLOOD BY AUTOMATED COUNT: 39.1 FL (ref 35–45)
GFR SERPL CREATININE-BSD FRML MDRD: > 90 ML/MIN/1.73M2
GLUCOSE BLD-MCNC: 191 MG/DL (ref 70–108)
GLUCOSE BLD-MCNC: 232 MG/DL (ref 70–108)
GLUCOSE, URINE: 100 MG/DL
HCT VFR BLD CALC: 39.3 % (ref 42–52)
HEMOGLOBIN: 13.3 GM/DL (ref 14–18)
IMMATURE GRANS (ABS): 0.05 THOU/MM3 (ref 0–0.07)
IMMATURE GRANULOCYTES: 0.4 %
INR BLD: 1.07 (ref 0.85–1.13)
KETONES, URINE: 15
LACTIC ACID: 1.6 MMOL/L (ref 0.5–2)
LEUKOCYTE ESTERASE, URINE: ABNORMAL
LYMPHOCYTES # BLD: 6.6 %
LYMPHOCYTES ABSOLUTE: 0.9 THOU/MM3 (ref 1–4.8)
MCH RBC QN AUTO: 29.7 PG (ref 26–33)
MCHC RBC AUTO-ENTMCNC: 33.8 GM/DL (ref 32.2–35.5)
MCV RBC AUTO: 87.7 FL (ref 80–94)
MONOCYTES # BLD: 5.5 %
MONOCYTES ABSOLUTE: 0.7 THOU/MM3 (ref 0.4–1.3)
NITRITE, URINE: POSITIVE
NUCLEATED RED BLOOD CELLS: 0 /100 WBC
PH UA: 6.5 (ref 5–9)
PLATELET # BLD: 230 THOU/MM3 (ref 130–400)
PMV BLD AUTO: 9.8 FL (ref 9.4–12.4)
POTASSIUM REFLEX MAGNESIUM: 3.7 MEQ/L (ref 3.5–5.2)
PROCALCITONIN: 0.03 NG/ML (ref 0.01–0.09)
PROTEIN UA: >= 300 MG/DL
RBC # BLD: 4.48 MILL/MM3 (ref 4.7–6.1)
RBC URINE: > 200 /HPF
SEG NEUTROPHILS: 87.1 %
SEGMENTED NEUTROPHILS ABSOLUTE COUNT: 11.5 THOU/MM3 (ref 1.8–7.7)
SODIUM BLD-SCNC: 135 MEQ/L (ref 135–145)
SPECIFIC GRAVITY UA: <= 1.005 (ref 1–1.03)
TOTAL PROTEIN: 7.1 G/DL (ref 6.1–8)
UROBILINOGEN, URINE: 4 EU/DL (ref 0–1)
WBC # BLD: 13.2 THOU/MM3 (ref 4.8–10.8)
WBC UA: ABNORMAL /HPF

## 2022-08-12 PROCEDURE — 6360000002 HC RX W HCPCS

## 2022-08-12 PROCEDURE — 80048 BASIC METABOLIC PNL TOTAL CA: CPT

## 2022-08-12 PROCEDURE — 36415 COLL VENOUS BLD VENIPUNCTURE: CPT

## 2022-08-12 PROCEDURE — 84145 PROCALCITONIN (PCT): CPT

## 2022-08-12 PROCEDURE — 99285 EMERGENCY DEPT VISIT HI MDM: CPT

## 2022-08-12 PROCEDURE — 83605 ASSAY OF LACTIC ACID: CPT

## 2022-08-12 PROCEDURE — 1200000000 HC SEMI PRIVATE

## 2022-08-12 PROCEDURE — 99223 1ST HOSP IP/OBS HIGH 75: CPT

## 2022-08-12 PROCEDURE — 85025 COMPLETE CBC W/AUTO DIFF WBC: CPT

## 2022-08-12 PROCEDURE — 82948 REAGENT STRIP/BLOOD GLUCOSE: CPT

## 2022-08-12 PROCEDURE — 85610 PROTHROMBIN TIME: CPT

## 2022-08-12 PROCEDURE — 80076 HEPATIC FUNCTION PANEL: CPT

## 2022-08-12 PROCEDURE — 85730 THROMBOPLASTIN TIME PARTIAL: CPT

## 2022-08-12 PROCEDURE — 81001 URINALYSIS AUTO W/SCOPE: CPT

## 2022-08-12 PROCEDURE — 2580000003 HC RX 258

## 2022-08-12 PROCEDURE — 51700 IRRIGATION OF BLADDER: CPT

## 2022-08-12 RX ORDER — LOSARTAN POTASSIUM 100 MG/1
100 TABLET ORAL DAILY
Status: DISCONTINUED | OUTPATIENT
Start: 2022-08-13 | End: 2022-08-15 | Stop reason: HOSPADM

## 2022-08-12 RX ORDER — DEXTROSE MONOHYDRATE 100 MG/ML
INJECTION, SOLUTION INTRAVENOUS CONTINUOUS PRN
Status: DISCONTINUED | OUTPATIENT
Start: 2022-08-12 | End: 2022-08-15 | Stop reason: HOSPADM

## 2022-08-12 RX ORDER — TAMSULOSIN HYDROCHLORIDE 0.4 MG/1
CAPSULE ORAL
Qty: 60 CAPSULE | Refills: 0 | Status: SHIPPED | OUTPATIENT
Start: 2022-08-12

## 2022-08-12 RX ORDER — SODIUM CHLORIDE 0.9 % (FLUSH) 0.9 %
10 SYRINGE (ML) INJECTION EVERY 12 HOURS SCHEDULED
Status: DISCONTINUED | OUTPATIENT
Start: 2022-08-12 | End: 2022-08-15 | Stop reason: HOSPADM

## 2022-08-12 RX ORDER — HYDROCHLOROTHIAZIDE 12.5 MG/1
12.5 CAPSULE, GELATIN COATED ORAL DAILY
Status: DISCONTINUED | OUTPATIENT
Start: 2022-08-13 | End: 2022-08-15 | Stop reason: HOSPADM

## 2022-08-12 RX ORDER — SODIUM CHLORIDE 0.9 % (FLUSH) 0.9 %
10 SYRINGE (ML) INJECTION PRN
Status: DISCONTINUED | OUTPATIENT
Start: 2022-08-12 | End: 2022-08-15 | Stop reason: HOSPADM

## 2022-08-12 RX ORDER — VITAMIN B COMPLEX
500 TABLET ORAL DAILY
Status: DISCONTINUED | OUTPATIENT
Start: 2022-08-13 | End: 2022-08-15 | Stop reason: HOSPADM

## 2022-08-12 RX ORDER — TAMSULOSIN HYDROCHLORIDE 0.4 MG/1
0.4 CAPSULE ORAL DAILY
Status: DISCONTINUED | OUTPATIENT
Start: 2022-08-13 | End: 2022-08-15 | Stop reason: HOSPADM

## 2022-08-12 RX ORDER — SODIUM CHLORIDE 9 MG/ML
INJECTION, SOLUTION INTRAVENOUS PRN
Status: DISCONTINUED | OUTPATIENT
Start: 2022-08-12 | End: 2022-08-15 | Stop reason: HOSPADM

## 2022-08-12 RX ORDER — INSULIN LISPRO 100 [IU]/ML
0-4 INJECTION, SOLUTION INTRAVENOUS; SUBCUTANEOUS
Status: DISCONTINUED | OUTPATIENT
Start: 2022-08-12 | End: 2022-08-15 | Stop reason: HOSPADM

## 2022-08-12 RX ORDER — SODIUM CHLORIDE, SODIUM LACTATE, POTASSIUM CHLORIDE, CALCIUM CHLORIDE 600; 310; 30; 20 MG/100ML; MG/100ML; MG/100ML; MG/100ML
INJECTION, SOLUTION INTRAVENOUS CONTINUOUS
Status: DISCONTINUED | OUTPATIENT
Start: 2022-08-12 | End: 2022-08-15 | Stop reason: HOSPADM

## 2022-08-12 RX ORDER — POTASSIUM CHLORIDE 7.45 MG/ML
10 INJECTION INTRAVENOUS PRN
Status: DISCONTINUED | OUTPATIENT
Start: 2022-08-12 | End: 2022-08-15 | Stop reason: HOSPADM

## 2022-08-12 RX ORDER — SODIUM CHLORIDE, SODIUM LACTATE, POTASSIUM CHLORIDE, CALCIUM CHLORIDE 600; 310; 30; 20 MG/100ML; MG/100ML; MG/100ML; MG/100ML
INJECTION, SOLUTION INTRAVENOUS CONTINUOUS
Status: DISCONTINUED | OUTPATIENT
Start: 2022-08-12 | End: 2022-08-12

## 2022-08-12 RX ORDER — MULTIVITAMIN WITH IRON
1 TABLET ORAL DAILY
Status: DISCONTINUED | OUTPATIENT
Start: 2022-08-13 | End: 2022-08-15 | Stop reason: HOSPADM

## 2022-08-12 RX ORDER — ONDANSETRON 2 MG/ML
4 INJECTION INTRAMUSCULAR; INTRAVENOUS EVERY 6 HOURS PRN
Status: DISCONTINUED | OUTPATIENT
Start: 2022-08-12 | End: 2022-08-15 | Stop reason: HOSPADM

## 2022-08-12 RX ORDER — INSULIN LISPRO 100 [IU]/ML
0-4 INJECTION, SOLUTION INTRAVENOUS; SUBCUTANEOUS NIGHTLY
Status: DISCONTINUED | OUTPATIENT
Start: 2022-08-12 | End: 2022-08-12 | Stop reason: SDUPTHER

## 2022-08-12 RX ORDER — MAGNESIUM SULFATE IN WATER 40 MG/ML
2000 INJECTION, SOLUTION INTRAVENOUS PRN
Status: DISCONTINUED | OUTPATIENT
Start: 2022-08-12 | End: 2022-08-15 | Stop reason: HOSPADM

## 2022-08-12 RX ORDER — ACETAMINOPHEN 325 MG/1
650 TABLET ORAL EVERY 6 HOURS PRN
Status: DISCONTINUED | OUTPATIENT
Start: 2022-08-12 | End: 2022-08-15 | Stop reason: HOSPADM

## 2022-08-12 RX ORDER — POLYETHYLENE GLYCOL 3350 17 G/17G
17 POWDER, FOR SOLUTION ORAL DAILY PRN
Status: DISCONTINUED | OUTPATIENT
Start: 2022-08-12 | End: 2022-08-15 | Stop reason: HOSPADM

## 2022-08-12 RX ORDER — ASPIRIN 81 MG/1
81 TABLET ORAL DAILY
Status: DISCONTINUED | OUTPATIENT
Start: 2022-08-12 | End: 2022-08-15 | Stop reason: HOSPADM

## 2022-08-12 RX ORDER — POTASSIUM CHLORIDE 20 MEQ/1
40 TABLET, EXTENDED RELEASE ORAL PRN
Status: DISCONTINUED | OUTPATIENT
Start: 2022-08-12 | End: 2022-08-15 | Stop reason: HOSPADM

## 2022-08-12 RX ORDER — ONDANSETRON 4 MG/1
4 TABLET, ORALLY DISINTEGRATING ORAL EVERY 8 HOURS PRN
Status: DISCONTINUED | OUTPATIENT
Start: 2022-08-12 | End: 2022-08-15 | Stop reason: HOSPADM

## 2022-08-12 RX ORDER — ACETAMINOPHEN 650 MG/1
650 SUPPOSITORY RECTAL EVERY 6 HOURS PRN
Status: DISCONTINUED | OUTPATIENT
Start: 2022-08-12 | End: 2022-08-15 | Stop reason: HOSPADM

## 2022-08-12 RX ADMIN — CEFTRIAXONE SODIUM 1000 MG: 1 INJECTION, POWDER, FOR SOLUTION INTRAMUSCULAR; INTRAVENOUS at 23:20

## 2022-08-12 RX ADMIN — SODIUM CHLORIDE, POTASSIUM CHLORIDE, SODIUM LACTATE AND CALCIUM CHLORIDE: 600; 310; 30; 20 INJECTION, SOLUTION INTRAVENOUS at 23:18

## 2022-08-12 RX ADMIN — SODIUM CHLORIDE, PRESERVATIVE FREE 10 ML: 5 INJECTION INTRAVENOUS at 23:10

## 2022-08-12 ASSESSMENT — ENCOUNTER SYMPTOMS
BACK PAIN: 1
ABDOMINAL DISTENTION: 1
RESPIRATORY NEGATIVE: 1
VOMITING: 0
NAUSEA: 0
ABDOMINAL PAIN: 1
RHINORRHEA: 0
SHORTNESS OF BREATH: 0
CHEST TIGHTNESS: 0
SORE THROAT: 0
CONSTIPATION: 0
COUGH: 0
EYES NEGATIVE: 1
SINUS PRESSURE: 0
PHOTOPHOBIA: 0
SINUS PAIN: 0
DIARRHEA: 0
ABDOMINAL DISTENTION: 0
ABDOMINAL PAIN: 0
ALLERGIC/IMMUNOLOGIC NEGATIVE: 1

## 2022-08-12 ASSESSMENT — PAIN - FUNCTIONAL ASSESSMENT
PAIN_FUNCTIONAL_ASSESSMENT: WONG-BAKER FACES
PAIN_FUNCTIONAL_ASSESSMENT: NONE - DENIES PAIN

## 2022-08-12 ASSESSMENT — LIFESTYLE VARIABLES
HOW MANY STANDARD DRINKS CONTAINING ALCOHOL DO YOU HAVE ON A TYPICAL DAY: PATIENT DOES NOT DRINK
HOW OFTEN DO YOU HAVE A DRINK CONTAINING ALCOHOL: NEVER

## 2022-08-12 ASSESSMENT — PAIN SCALES - WONG BAKER: WONGBAKER_NUMERICALRESPONSE: 4;6

## 2022-08-12 NOTE — ED NOTES
Upon first contact with patient this RN receives bedside shift report Joselin MOTLEY. Pt resting in bed. Pt updated on POC, verbalized understanding.         Marjorie Nunes RN  08/12/22 1923

## 2022-08-12 NOTE — ED PROVIDER NOTES

## 2022-08-12 NOTE — ED PROVIDER NOTES
[facility] ENCOUNTER          Pt Name: Dana Trinh  MRN: [id number]  [place] 1960  Date of evaluation: 8/12/2022  Treating Resident Physician: Ariel Tolbert MD  Supervising Physician: Dr Isabelle Powell    History obtained from the patient. CHIEF COMPLAINT     No chief complaint on file. HISTORY OF PRESENT ILLNESS    HPI  Dana Trinh is a 60 y.o. male who presents to the emergency department for evaluation of hematuria and urinary retention    Patient states that on Wednesday he noted that he was starting to pass clots from the penis. Patient had a greenlight photo laser ablation of his prostate performed in May and presented with some initial hematuria pain and retention that resolved after several days. Patient denies any recent traumas or Mendez catheter placements. Prior to symptoms starting. Patient is not having any abdominal pain or flank pain. Patient denies any fevers chills nausea vomiting lightheadedness dizziness. Patient does note that he has had difficulty starting urinating since and when he is able to urinate notes that he is passing large clots. Patient states that he can feel that his abdomen is distending and it started to get uncomfortable but is not painful yet. Patient spoke with his urologist Dr. Caryn Anderson who recommended he come into the emergency department to be evaluated for his hematuria. The patient has no other acute complaints at this time. REVIEW OF SYSTEMS   Review of Systems   Constitutional: Negative. Negative for activity change, chills, fatigue and fever. HENT: Negative. Negative for sinus pressure and sinus pain. Eyes: Negative. Negative for photophobia and visual disturbance. Respiratory: Negative. Negative for cough, chest tightness and shortness of breath. Cardiovascular: Negative. Negative for chest pain and leg swelling.    Gastrointestinal:  Positive for abdominal distention. Negative for abdominal pain, constipation, diarrhea, nausea and vomiting. Endocrine: Negative. Genitourinary:  Positive for difficulty urinating, hematuria and urgency. Negative for dysuria. Musculoskeletal: Negative. Skin: Negative. Negative for rash and wound. Allergic/Immunologic: Negative. Neurological: Negative. Negative for dizziness, light-headedness, numbness and headaches. Hematological: Negative. Psychiatric/Behavioral: Negative. Negative for agitation and confusion. All other systems reviewed and are negative. PAST MEDICAL AND SURGICAL HISTORY     Past Medical History:   Diagnosis Date    Anxiety     Depression     Hypertension     Obesity     Type 2 diabetes mellitus without complication, without long-term current use of insulin ([facility] [address]     doesnt take any medications at this time     Unspecified sleep apnea     Wears CPAP     Past Surgical History:   Procedure Laterality Date    ABDOMEN SURGERY      APPENDECTOMY      BACK SURGERY      Herniated disc lumbar-- [address]     CHOLECYSTECTOMY      COLONOSCOPY      TURP N/A 5/17/2022    CYSTO, GREENLIGHT PHOTOVAPORIZATION OF THE PROSTATE performed by Bang Taylor MD at [address]     Current Facility-Administered Medications:     [Held by provider] aspirin EC tablet 81 mg, 81 mg, Oral, Daily, Letty Sutherland PA-C    [START ON 8/13/2022] hydroCHLOROthiazide (MICROZIDE) capsule 12.5 mg, 12.5 mg, Oral, Daily, Letty Sutherland PA-C    [START ON 8/13/2022] losartan (COZAAR) tablet 100 mg, 100 mg, Oral, Daily, Letty Sutherland PA-C    [START ON 8/13/2022] multivitamin 1 tablet, 1 tablet, Oral, Daily, Letty Sutherland PA-C    [START ON 8/13/2022] sertraline (ZOLOFT) tablet 50 mg, 50 mg, Oral, Daily, Letty Sutherland PA-C    [START ON 8/13/2022] tamsulosin (FLOMAX) capsule 0.4 mg, 0.4 mg, Oral, Daily, Letty Sutherland PA-C    [START ON 8/13/2022] Vitamin D (CHOLECALCIFEROL) tablet 500 Units, 500 Units, Oral, Daily, Letty Sutherland PA-C    sodium chloride flush 0.9 % injection 10 mL, 10 mL, IntraVENous, 2 times per day, Ricky Araujo PA-C    sodium chloride flush 0.9 % injection 10 mL, 10 mL, IntraVENous, PRN, EVELINE Bullock-KONG    0.9 % sodium chloride infusion, , IntraVENous, PRN, EVELINE Bullock-KONG    ondansetron (ZOFRAN-ODT) disintegrating tablet 4 mg, 4 mg, Oral, Q8H PRN **OR** ondansetron (ZOFRAN) injection 4 mg, 4 mg, IntraVENous, Q6H PRN, JUAREZ BullockC    polyethylene glycol (GLYCOLAX) packet 17 g, 17 g, Oral, Daily PRN, Letty Sutherland PA-C    acetaminophen (TYLENOL) tablet 650 mg, 650 mg, Oral, Q6H PRN **OR** acetaminophen (TYLENOL) suppository 650 mg, 650 mg, Rectal, Q6H PRN, Letty Sutherland PA-C    potassium chloride (KLOR-CON M) extended release tablet 40 mEq, 40 mEq, Oral, PRN **OR** potassium bicarb-citric acid (EFFER-K) effervescent tablet 40 mEq, 40 mEq, Oral, PRN **OR** potassium chloride 10 mEq/100 mL IVPB (Peripheral Line), 10 mEq, IntraVENous, PRN, Letty Sutherland PA-C    magnesium sulfate 2000 mg in 50 mL IVPB premix, 2,000 mg, IntraVENous, PRN, Letty Sutherland PA-C    insulin lispro (HUMALOG) injection vial 0-4 Units, 0-4 Units, SubCUTAneous, 4x Daily PC & HS, Letty Sutherland PA-C    glucose chewable tablet 16 g, 4 tablet, Oral, PRN, Letty Sutherland PA-C    dextrose bolus 10% 125 mL, 125 mL, IntraVENous, PRN **OR** dextrose bolus 10% 250 mL, 250 mL, IntraVENous, PRN, Letty Sutherland PA-C    glucagon (rDNA) injection 1 mg, 1 mg, SubCUTAneous, PRN, EVELINE Bullock-KONG    dextrose 10 % infusion, , IntraVENous, Continuous PRN, Letty Sutherland PA-C    cefTRIAXone (ROCEPHIN) 1,000 mg in dextrose 5 % 50 mL IVPB mini-bag, 1,000 mg, IntraVENous, Q24H, Letty Sutherland PA-C    lactated ringers infusion, , IntraVENous, Continuous, Letty Sutherland PA-C      SOCIAL HISTORY     Social History     Social History Narrative    Not on file     Social History     Tobacco Use    Smoking status: Never    Smokeless tobacco: Never   Vaping Use    Vaping Use: Never used   Substance Use Topics    Alcohol use: Yes     Alcohol/week: 0.0 standard drinks     Comment: occasional    Drug use: No         ALLERGIES   No Known Allergies      FAMILY HISTORY     Family History   Problem Relation Age of Onset    COPD Mother     Diabetes Father     Heart Attack Father     Other Father         Peritonitis         PREVIOUS RECORDS   Previous records reviewed: This is this patient's first visit to [facility] ED, no previous records available on EMR. .        PHYSICAL EXAM     ED Triage Vitals [08/12/22 1714]   BP Temp Temp Source Heart Rate Resp SpO2 Height Weight   111/88 98.4 °F (36.9 °C) Oral (!) 128 18 97 % -- --     Initial vital signs and nursing assessment reviewed and normal. Body mass index is 45.71 kg/m². Pulsoximetry is normal per my interpretation. Additional Vital Signs:  Vitals:    08/12/22 2033   BP: (!) 140/80   Pulse: 88   Resp: 16   Temp: 98.6 °F (37 °C)   SpO2: 97%       Physical Exam  Vitals and nursing note reviewed. Constitutional:       Appearance: He is obese. HENT:      Head: Normocephalic and atraumatic. Eyes:      Extraocular Movements: Extraocular movements intact. Pupils: Pupils are equal, round, and reactive to light. Cardiovascular:      Rate and Rhythm: Normal rate and regular rhythm. Pulses: Normal pulses. Heart sounds: Normal heart sounds, S1 normal and S2 normal. No murmur heard. No friction rub. No gallop. No S3 or S4 sounds. Pulmonary:      Effort: Pulmonary effort is normal. No tachypnea or respiratory distress. Breath sounds: Normal breath sounds. Abdominal:      General: Abdomen is flat. Bowel sounds are normal.      Palpations: Abdomen is soft. Tenderness: There is abdominal tenderness in the suprapubic area. There is no right CVA tenderness, left CVA tenderness, guarding or rebound.       Comments: Suprapubic tenderness to palpation with guarding. No rebound. No CVA tenderness. Musculoskeletal:         General: Normal range of motion. Right lower leg: No edema. Left lower leg: No edema. Skin:     General: Skin is warm and dry. Capillary Refill: Capillary refill takes less than 2 seconds. Neurological:      General: No focal deficit present. Mental Status: He is alert. MEDICAL DECISION MAKING   Initial Assessment:   Patient is a 69-year-old male who is 3 months status post green laser ablation of prostate presenting with hematuria and urinary retention. Patient passing clots when urinating. Physical exam document as above notably patient does have reproducible suprapubic tenderness without rebound or guarding but no CVA tenderness. Patient does not appear to be in respiratory distress and no extra heart sounds are noted. Differential diagnosis for this patient include coagulopathy secondary to hepatitis or cirrhosis, kidney stone, acute cystitis, unrecognized trauma. Plan:   CBC, BMP, LFTs, urinalysis, bladder irrigation.         ED RESULTS   Laboratory results:  Labs Reviewed   CBC WITH AUTO DIFFERENTIAL - Abnormal; Notable for the following components:       Result Value    WBC 13.2 (*)     RBC 4.48 (*)     Hemoglobin 13.3 (*)     Hematocrit 39.3 (*)     Segs Absolute 11.5 (*)     Lymphocytes Absolute 0.9 (*)     All other components within normal limits   BASIC METABOLIC PANEL W/ REFLEX TO MG FOR LOW K - Abnormal; Notable for the following components:    Glucose 232 (*)     All other components within normal limits   URINALYSIS WITH MICROSCOPIC - Abnormal; Notable for the following components:    Glucose, Urine 100 (*)     Ketones, Urine 15 (*)     Blood, Urine LARGE (*)     Protein, UA >= 300 (*)     Urobilinogen, Urine 4.0 (*)     Nitrite, Urine POSITIVE (*)     Leukocyte Esterase, Urine MODERATE (*)     Color, UA RED (*)     Character, Urine TURBID (*)     All other components within normal limits HEPATIC FUNCTION PANEL   PROTIME-INR   APTT   ANION GAP   GLOMERULAR FILTRATION RATE, ESTIMATED   BASIC METABOLIC PANEL W/ REFLEX TO MG FOR LOW K   CBC WITH AUTO DIFFERENTIAL   LACTIC ACID   PROCALCITONIN   POCT GLUCOSE   POCT GLUCOSE       Radiologic studies results:  No orders to display       ED Medications administered this visit:   Medications   aspirin EC tablet 81 mg ( Oral Automatically Held 8/15/22 0900)   hydroCHLOROthiazide (MICROZIDE) capsule 12.5 mg (has no administration in time range)   losartan (COZAAR) tablet 100 mg (has no administration in time range)   multivitamin 1 tablet (has no administration in time range)   sertraline (ZOLOFT) tablet 50 mg (has no administration in time range)   tamsulosin (FLOMAX) capsule 0.4 mg (has no administration in time range)   Vitamin D (CHOLECALCIFEROL) tablet 500 Units (has no administration in time range)   sodium chloride flush 0.9 % injection 10 mL (has no administration in time range)   sodium chloride flush 0.9 % injection 10 mL (has no administration in time range)   0.9 % sodium chloride infusion (has no administration in time range)   ondansetron (ZOFRAN-ODT) disintegrating tablet 4 mg (has no administration in time range)     Or   ondansetron (ZOFRAN) injection 4 mg (has no administration in time range)   polyethylene glycol (GLYCOLAX) packet 17 g (has no administration in time range)   acetaminophen (TYLENOL) tablet 650 mg (has no administration in time range)     Or   acetaminophen (TYLENOL) suppository 650 mg (has no administration in time range)   potassium chloride (KLOR-CON M) extended release tablet 40 mEq (has no administration in time range)     Or   potassium bicarb-citric acid (EFFER-K) effervescent tablet 40 mEq (has no administration in time range)     Or   potassium chloride 10 mEq/100 mL IVPB (Peripheral Line) (has no administration in time range)   magnesium sulfate 2000 mg in 50 mL IVPB premix (has no administration in time range) insulin lispro (HUMALOG) injection vial 0-4 Units (has no administration in time range)   glucose chewable tablet 16 g (has no administration in time range)   dextrose bolus 10% 125 mL (has no administration in time range)     Or   dextrose bolus 10% 250 mL (has no administration in time range)   glucagon (rDNA) injection 1 mg (has no administration in time range)   dextrose 10 % infusion (has no administration in time range)   cefTRIAXone (ROCEPHIN) 1,000 mg in dextrose 5 % 50 mL IVPB mini-bag (has no administration in time range)   lactated ringers infusion (has no administration in time range)         ED COURSE     ED Course as of 08/12/22 2155   Fri Aug 12, 2022   2153 Patient's urinalysis demonstrates urinary tract infection as well as large amounts of blood. These results did not come back until after the patient was already upstairs. So patient did not receive antibiotics for the urinary tract infection down here. Team of service was contacted regarding these results. Patient had continuous bladder irrigation titrated to pink with multiple clots expelled. Hospital team agreed to admit this patient. Urology will see in the morning. [SILVIA]      ED Course User Index  [SILVIA] Carlito Stone MD           MEDICATION CHANGES     Current Discharge Medication List            FINAL DISPOSITION     Final diagnoses:   Urinary retention   Hematuria, unspecified type     Condition: condition: stable  Dispo: Admit to med/surg floor      This transcription was electronically signed. Parts of this transcriptions may have been dictated by use of voice recognition software and electronically transcribed, and parts may have been transcribed with the assistance of an ED scribe. The transcription may contain errors not detected in proofreading. Please refer to my supervising physician's documentation if my documentation differs.     Electronically Signed: iMreya Amato MD, 08/12/22, 9:55 PM          [employer] Jumper, MD  Resident  08/12/22 2373

## 2022-08-12 NOTE — ED TRIAGE NOTES
Pt is on Cipro for a UTI s/p prostate surgery. He states he has been unable to void and is just dribbling blood progressively worse since Wednesday night.  He called Dr. Rainer Singh office who encouraged him to keep drinking water, but he still can't urinate

## 2022-08-12 NOTE — TELEPHONE ENCOUNTER
Patient is passing 50 cent size clots. Patient was taking flomax BID but the prescription was sent for 1 a day. Dr Tyshawn Holcomb changed it to BID at a past appointment    He will need a short term supply sent to [address] [place] [address] Avenue will discuss the dose with Dr Estela Jordan on 08/16/2022 if he should continue to take BID or go back to daily.     Patient advised to continue to hold the aspirin, push fluids, continue cipro while waiting on the final urine results, and if unable to void to be evaluated in the ER

## 2022-08-12 NOTE — ED NOTES
Prior to CBI initiation, Pt output 1700ml from centeno bag. Urine is dark red in color.       Michi Rider RN  08/12/22 1647

## 2022-08-13 ENCOUNTER — APPOINTMENT (OUTPATIENT)
Dept: CT IMAGING | Age: 62
DRG: 690 | End: 2022-08-13
Payer: COMMERCIAL

## 2022-08-13 LAB
ANION GAP SERPL CALCULATED.3IONS-SCNC: 12 MEQ/L (ref 8–16)
BASOPHILS # BLD: 0.4 %
BASOPHILS ABSOLUTE: 0 THOU/MM3 (ref 0–0.1)
BUN BLDV-MCNC: 14 MG/DL (ref 7–22)
CALCIUM SERPL-MCNC: 9.8 MG/DL (ref 8.5–10.5)
CHLORIDE BLD-SCNC: 100 MEQ/L (ref 98–111)
CO2: 29 MEQ/L (ref 23–33)
CREAT SERPL-MCNC: 0.6 MG/DL (ref 0.4–1.2)
EOSINOPHIL # BLD: 0.8 %
EOSINOPHILS ABSOLUTE: 0.1 THOU/MM3 (ref 0–0.4)
ERYTHROCYTE [DISTWIDTH] IN BLOOD BY AUTOMATED COUNT: 12.4 % (ref 11.5–14.5)
ERYTHROCYTE [DISTWIDTH] IN BLOOD BY AUTOMATED COUNT: 41.1 FL (ref 35–45)
GFR SERPL CREATININE-BSD FRML MDRD: > 90 ML/MIN/1.73M2
GLUCOSE BLD-MCNC: 137 MG/DL (ref 70–108)
GLUCOSE BLD-MCNC: 154 MG/DL (ref 70–108)
GLUCOSE BLD-MCNC: 162 MG/DL (ref 70–108)
GLUCOSE BLD-MCNC: 181 MG/DL (ref 70–108)
GLUCOSE BLD-MCNC: 199 MG/DL (ref 70–108)
HCT VFR BLD CALC: 38.8 % (ref 42–52)
HEMOGLOBIN: 12.8 GM/DL (ref 14–18)
IMMATURE GRANS (ABS): 0.02 THOU/MM3 (ref 0–0.07)
IMMATURE GRANULOCYTES: 0.2 %
LYMPHOCYTES # BLD: 15.8 %
LYMPHOCYTES ABSOLUTE: 1.7 THOU/MM3 (ref 1–4.8)
MCH RBC QN AUTO: 29.4 PG (ref 26–33)
MCHC RBC AUTO-ENTMCNC: 33 GM/DL (ref 32.2–35.5)
MCV RBC AUTO: 89.2 FL (ref 80–94)
MONOCYTES # BLD: 8.1 %
MONOCYTES ABSOLUTE: 0.9 THOU/MM3 (ref 0.4–1.3)
NUCLEATED RED BLOOD CELLS: 0 /100 WBC
OSMOLALITY CALCULATION: 286.3 MOSMOL/KG (ref 275–300)
PLATELET # BLD: 231 THOU/MM3 (ref 130–400)
PMV BLD AUTO: 10 FL (ref 9.4–12.4)
POTASSIUM REFLEX MAGNESIUM: 4.3 MEQ/L (ref 3.5–5.2)
RBC # BLD: 4.35 MILL/MM3 (ref 4.7–6.1)
SEG NEUTROPHILS: 74.7 %
SEGMENTED NEUTROPHILS ABSOLUTE COUNT: 7.9 THOU/MM3 (ref 1.8–7.7)
SODIUM BLD-SCNC: 141 MEQ/L (ref 135–145)
URINE CULTURE, ROUTINE: NORMAL
WBC # BLD: 10.6 THOU/MM3 (ref 4.8–10.8)

## 2022-08-13 PROCEDURE — 82948 REAGENT STRIP/BLOOD GLUCOSE: CPT

## 2022-08-13 PROCEDURE — 2580000003 HC RX 258

## 2022-08-13 PROCEDURE — 74176 CT ABD & PELVIS W/O CONTRAST: CPT

## 2022-08-13 PROCEDURE — 80048 BASIC METABOLIC PNL TOTAL CA: CPT

## 2022-08-13 PROCEDURE — 6360000002 HC RX W HCPCS

## 2022-08-13 PROCEDURE — 85025 COMPLETE CBC W/AUTO DIFF WBC: CPT

## 2022-08-13 PROCEDURE — 6360000002 HC RX W HCPCS: Performed by: UROLOGY

## 2022-08-13 PROCEDURE — 36415 COLL VENOUS BLD VENIPUNCTURE: CPT

## 2022-08-13 PROCEDURE — 99232 SBSQ HOSP IP/OBS MODERATE 35: CPT | Performed by: OPHTHALMOLOGY

## 2022-08-13 PROCEDURE — 1200000000 HC SEMI PRIVATE

## 2022-08-13 PROCEDURE — 6370000000 HC RX 637 (ALT 250 FOR IP)

## 2022-08-13 RX ORDER — ATROPA BELLADONNA AND OPIUM 16.2; 6 MG/1; MG/1
60 SUPPOSITORY RECTAL EVERY 8 HOURS PRN
Status: DISCONTINUED | OUTPATIENT
Start: 2022-08-13 | End: 2022-08-13 | Stop reason: RX

## 2022-08-13 RX ADMIN — LOSARTAN POTASSIUM 100 MG: 100 TABLET, FILM COATED ORAL at 08:36

## 2022-08-13 RX ADMIN — HYDROCHLOROTHIAZIDE 12.5 MG: 12.5 CAPSULE ORAL at 08:36

## 2022-08-13 RX ADMIN — SODIUM CHLORIDE, POTASSIUM CHLORIDE, SODIUM LACTATE AND CALCIUM CHLORIDE: 600; 310; 30; 20 INJECTION, SOLUTION INTRAVENOUS at 11:52

## 2022-08-13 RX ADMIN — Medication 1 TABLET: at 08:36

## 2022-08-13 RX ADMIN — HYDROMORPHONE HYDROCHLORIDE 0.5 MG: 1 INJECTION, SOLUTION INTRAMUSCULAR; INTRAVENOUS; SUBCUTANEOUS at 13:10

## 2022-08-13 RX ADMIN — TAMSULOSIN HYDROCHLORIDE 0.4 MG: 0.4 CAPSULE ORAL at 08:36

## 2022-08-13 RX ADMIN — SERTRALINE 50 MG: 50 TABLET, FILM COATED ORAL at 08:36

## 2022-08-13 RX ADMIN — SODIUM CHLORIDE, POTASSIUM CHLORIDE, SODIUM LACTATE AND CALCIUM CHLORIDE: 600; 310; 30; 20 INJECTION, SOLUTION INTRAVENOUS at 20:10

## 2022-08-13 RX ADMIN — CEFTRIAXONE SODIUM 1000 MG: 1 INJECTION, POWDER, FOR SOLUTION INTRAMUSCULAR; INTRAVENOUS at 21:48

## 2022-08-13 RX ADMIN — SODIUM CHLORIDE, PRESERVATIVE FREE 10 ML: 5 INJECTION INTRAVENOUS at 08:36

## 2022-08-13 RX ADMIN — SODIUM CHLORIDE, POTASSIUM CHLORIDE, SODIUM LACTATE AND CALCIUM CHLORIDE: 600; 310; 30; 20 INJECTION, SOLUTION INTRAVENOUS at 05:29

## 2022-08-13 RX ADMIN — Medication 500 UNITS: at 08:35

## 2022-08-13 ASSESSMENT — PAIN SCALES - WONG BAKER
WONGBAKER_NUMERICALRESPONSE: 0

## 2022-08-13 ASSESSMENT — PAIN DESCRIPTION - ORIENTATION: ORIENTATION: LOWER

## 2022-08-13 ASSESSMENT — PAIN SCALES - GENERAL
PAINLEVEL_OUTOF10: 10
PAINLEVEL_OUTOF10: 0
PAINLEVEL_OUTOF10: 0

## 2022-08-13 ASSESSMENT — PAIN DESCRIPTION - LOCATION: LOCATION: PENIS;BACK

## 2022-08-13 ASSESSMENT — PAIN DESCRIPTION - DESCRIPTORS: DESCRIPTORS: THROBBING

## 2022-08-13 NOTE — CONSULTS
[facility]  [address] [place] [address]  Dept: [phone]  Loc: [phone]  Visit Date: 8/12/2022    Urology Consult Note    Reason for Consult: Urinary retention and gross hematuria  Requesting Physician: Medicine service  ______________________________________________    Urologic Impression: Post greenlight clot retention. CT scan ordered. Moderate amount of clot but should be able to resolve without surgical intervention. Urologic Plans / Recommendations: Continue CBI and pain control.    ______________________________________________    History Obtained From:  patient    Chief Complaint: Blood in urine and inability to urinate    HISTORY OF PRESENT ILLNESS:                The patient is a 60 y.o. male admitted for urinary retention and has the following urologic problem: Post greenlight laser hematuria. Patient stated he was doing well from his greenlight procedure 3 months ago. Recently started to pass some \"scabs\". He is on aspirin. 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 He noticed his urine become darker and had inability urinate so he presented to the emergency room. Mendez catheter was placed. CBI started. Intermittent red output. Patient with some significant bladder spasms and bladder discomfort. Belladonna opium suppositories are on nationwide backorder. He did get pain relief from IV Dilaudid. Past Medical History:        Diagnosis Date    Anxiety     Depression     Hypertension     Obesity     Type 2 diabetes mellitus without complication, without long-term current use of insulin ([address]     doesnt take any medications at this time     Unspecified sleep apnea     Wears CPAP     Past Surgical History:        Procedure Laterality Date    ABDOMEN SURGERY      APPENDECTOMY      BACK SURGERY      Herniated disc lumbar--Dr. Greg Neville.     CHOLECYSTECTOMY      COLONOSCOPY      TURP N/A 5/17/2022    Tatiana GILBERT PHOTOVAPORIZATION OF THE PROSTATE performed by Urvashi Caldwell MD at [address]:  Patient has no known allergies. Social History:  Social History     Socioeconomic History    Marital status:      Spouse name: Not on file    Number of children: Not on file    Years of education: Not on file    Highest education level: Not on file   Occupational History     Employer: [employer]   Tobacco Use    Smoking status: Never    Smokeless tobacco: Never   Vaping Use    Vaping Use: Never used   Substance and Sexual Activity    Alcohol use: Yes     Alcohol/week: 0.0 standard drinks     Comment: occasional    Drug use: No    Sexual activity: Yes     Comment:    Other Topics Concern    Not on file   Social History Narrative    Not on file     Social Determinants of Health     Financial Resource Strain: Low Risk     Difficulty of Paying Living Expenses: Not hard at all   Food Insecurity: No Food Insecurity    Worried About Running Out of Food in the Last Year: Never true    Ran Out of Food in the Last Year: Never true   Transportation Needs: Not on file   Physical Activity: Not on file   Stress: Not on file   Social Connections: Not on file   Intimate Partner Violence: Not on file   Housing Stability: Not on file     Family History:       Problem Relation Age of Onset    COPD Mother     Diabetes Father     Heart Attack Father     Other Father         Peritonitis       PHYSICAL EXAM:  VITALS:  BP (!) 147/68   Pulse 64   Temp 97.8 °F (36.6 °C) (Oral)   Resp 17   Ht 6' (1.829 m)   Wt (!) 337 lb (152.9 kg)   SpO2 97%   BMI 45.71 kg/m² . Nursing note and vitals reviewed.   General Appearance:  no acute distress  HEENT:  normal appearance  Cardiovascular:  no significant peripheral edema  Respiratory:  normal respiratory effort  Gastrointestinal:  abdomen is not distended and is consistent with BMI  Musculoskeletal:  normal range of motion to all extremities  Neurologic: no focal weakness; no tremor  Psychiatric: normal affect, normal train of thought, cooperative  : Mendez catheter in place. Pink to light red on slow CBI that readily clears when CBI increased. DATA:  CBC:   Lab Results   Component Value Date/Time    WBC 10.6 08/13/2022 07:39 AM    RBC 4.35 08/13/2022 07:39 AM    HGB 12.8 08/13/2022 07:39 AM    HCT 38.8 08/13/2022 07:39 AM    MCV 89.2 08/13/2022 07:39 AM    MCH 29.4 08/13/2022 07:39 AM    MCHC 33.0 08/13/2022 07:39 AM    RDW 13.0 08/23/2017 06:33 AM     08/13/2022 07:39 AM    MPV 10.0 08/13/2022 07:39 AM     BMP:    Lab Results   Component Value Date/Time     08/13/2022 07:39 AM    K 4.3 08/13/2022 07:39 AM     08/13/2022 07:39 AM    CO2 29 08/13/2022 07:39 AM    BUN 14 08/13/2022 07:39 AM    CREATININE 0.6 08/13/2022 07:39 AM    CALCIUM 9.8 08/13/2022 07:39 AM    LABGLOM >90 08/13/2022 07:39 AM    GLUCOSE 181 08/13/2022 07:39 AM    GLUCOSE 155 09/30/2019 11:40 AM     BUN/Creatinine:    Lab Results   Component Value Date/Time    BUN 14 08/13/2022 07:39 AM    CREATININE 0.6 08/13/2022 07:39 AM     Magnesium:  No results found for: MG  Phosphorus:  No results found for: PHOS  PT/INR:    Lab Results   Component Value Date/Time    INR 1.07 08/12/2022 06:01 PM     U/A:    Lab Results   Component Value Date/Time    NITRITE neg 11/01/2019 09:24 AM    COLORU RED 08/12/2022 07:00 PM    PHUR 6.5 08/12/2022 07:00 PM    LABCAST NONE SEEN 08/12/2022 07:00 PM    WBCUA 25-50 08/12/2022 07:00 PM    RBCUA > 200 08/12/2022 07:00 PM    MUCUS NONE SEEN 08/11/2022 09:12 AM    YEAST NONE SEEN 08/11/2022 09:12 AM    BACTERIA FEW 08/12/2022 07:00 PM    SPECGRAV <=1.005 08/12/2022 07:00 PM    LEUKOCYTESUR MODERATE 08/12/2022 07:00 PM    UROBILINOGEN 4.0 08/12/2022 07:00 PM    BILIRUBINUR NEGATIVE 08/12/2022 07:00 PM    BLOODU LARGE 08/12/2022 07:00 PM    GLUCOSEU Negative 03/08/2022 07:57 AM    AMORPHOUS NONE SEEN 08/11/2022 09:12 AM       Imaging:    The patient has had the following relevant imaging to this  consult: CT scan abdomen pelvis,  which I have independently reviewed along with its accompanying report. The study demonstrates Mendez catheter in proper position. Air in bladder consistent with catheterization. Moderate amount of clot at the anterior wall.         Thank you for including us in the care of Jose Esteves MD, MD  08/13/22 7:28 PM  Urology

## 2022-08-13 NOTE — PROGRESS NOTES
Hospitalist Progress Note    Patient:  Dada Merino      Unit/Bed:8A-07/007-A    YOB: 1960    MRN: [id number]       Acct: [de-identified]     PCP: Niall Mcdaniel MD    Date of Admission: 8/12/2022    Chief Complaint:  Urinary retention     Assessment and Plan:  Gross hematuria, with urinary retention S/P  greenlight procedure:              Pt presented afebrile, non-toxic appearing, hemodynamically stable, with urinary retention. Centeno was placed, gross blood noted in centeno with immediate drainage and relief of abdominal pain. Follows with Dr. Braxton Villegas- greenlight procedure 5/17/22. Continue to monitor H&H with daily CBC. Three-way centeno catheter placed with CBI ordered. Plan NPO at midnight for likely cystoscopy in the AM. Urology consulted. UTI:              PT afebrile, non-toxic appearing. WBC 13.2, UA positive nitrites, leukocytes, large blood, few bacteria. LA, Pro vita, Urine culture ordered. Will start Rocephin, gentle IVF. May consider trending LA and increase fluids vs fluid bolus if LA is elevated. T2DM:              Glucose 232 on admission. Does not appear to be on any home meds outpatient. Low dose SSI, hypoglycemia protocol ordered. Continue to monitor with daily BMP, POCT glucose checks. Carb limited diet. Essential HTN:              BP overall well controlled. Will continue home meds with holding parameters. and continue to monitor closely. History Of Present Illness:    Micheal Remy is a 61 y/o  male with a PMHx of HTN, T2DM, GEM who presents to [facility] ED today for the evaluation of urinary retention for 3 days. Pt states since Wednesday he has had intermittent difficulty urinating, with the passing of blood clots. He reports associated lower abdominal and flank pain on both sides, and groin pain. He states he has a history of enlarged prostate and follows with Dr. Braxton Villegas outpatient.  In May he reportedly had a urologic procedure, and has not had difficulties until Wednesday with urinating. He states yesterday evening he noticed his symptoms worsen, and decided to come in today for further evaluation. Pt denies taking any anticoagulation. He reports he does take ASA daily. He denies fever, chills, nausea, vomiting, chest pain, shortness of breath. Subjective (past 24 hours)  Pain is under control: better since admission  Still with active gross hematuria  No chest pain, no SOB  Diet:  Diet NPO      Medications:  Scheduled Meds:   [Held by provider] aspirin  81 mg Oral Daily    hydroCHLOROthiazide  12.5 mg Oral Daily    losartan  100 mg Oral Daily    multivitamin  1 tablet Oral Daily    sertraline  50 mg Oral Daily    tamsulosin  0.4 mg Oral Daily    Vitamin D  500 Units Oral Daily    sodium chloride flush  10 mL IntraVENous 2 times per day    insulin lispro  0-4 Units SubCUTAneous 4x Daily PC & HS    cefTRIAXone (ROCEPHIN) IV  1,000 mg IntraVENous Q24H     Continuous Infusions:   sodium chloride      dextrose      lactated ringers 150 mL/hr at 08/13/22 0704     PRN Meds:sodium chloride flush, sodium chloride, ondansetron **OR** ondansetron, polyethylene glycol, acetaminophen **OR** acetaminophen, potassium chloride **OR** potassium alternative oral replacement **OR** potassium chloride, magnesium sulfate, glucose, dextrose bolus **OR** dextrose bolus, glucagon (rDNA), dextrose    Objective:    Review of Labs and Diagnostic Testing:  Labs:     Recent Labs     08/12/22  1801 08/13/22  0739   WBC 13.2* 10.6   HGB 13.3* 12.8*   HCT 39.3* 38.8*    231     Recent Labs     08/12/22  1801 08/13/22  0739    141   K 3.7 4.3   CL 98 100   CO2 24 29   BUN 10 14   CREATININE 0.6 0.6   CALCIUM 9.9 9.8     Recent Labs     08/12/22  1801   AST 20   ALT 21   BILIDIR <0.2   BILITOT 0.6   ALKPHOS 92     No results found for: AMYLASE  Recent Labs     08/12/22  1801   INR 1.07     No results for input(s): TROPONINT in the last 72 hours.   No results for input(s): BNP in the last 72 hours. Recent Labs     08/12/22  2144   LACTA 1.6     Lab Results   Component Value Date/Time    PROCAL 0.03 08/12/2022 06:01 PM     Lab Results   Component Value Date/Time    LABA1C 6.9 05/04/2021 12:31 PM       Urinalysis:   Lab Results   Component Value Date/Time    NITRU POSITIVE 08/12/2022 07:00 PM    WBCUA 25-50 08/12/2022 07:00 PM    BACTERIA FEW 08/12/2022 07:00 PM    RBCUA > 200 08/12/2022 07:00 PM    BLOODU LARGE 08/12/2022 07:00 PM    SPECGRAV <=1.005 08/12/2022 07:00 PM    GLUCOSEU Negative 03/08/2022 07:57 AM     Radiology:   Reviewed: see report for details  CXR: I have reviewed the CXR  EKG:  No acute changes:  No orders to display       Physical Exam:  BP (!) 152/80   Pulse 76   Temp 98.1 °F (36.7 °C) (Oral)   Resp 16   Ht 6' (1.829 m)   Wt (!) 337 lb (152.9 kg)   SpO2 99%   BMI 45.71 kg/m²   General appearance - alert, well appearing, and in no distress   Chest - clear to auscultation, no wheezes, rales or rhonchi, symmetric air entry   Distant Breath Sounds: No   Heart - normal rate, regular rhythm, normal S1, S2, no murmurs, rubs, clicks or gallops   Abdomen - soft, not tender, nondistended, no masses or organomegaly   Obese: No; Protuberant: Bowel sounds heard  Neurological - A&O 3 , normal speech, no focal findings or movement disorder noted   Musculoskeletal - no joint tenderness, deformity or swelling   Extremities - peripheral pulses normal, no pedal edema, no clubbing or cyanosis  24 hour intake/output:  Intake/Output Summary (Last 24 hours) at 8/13/2022 0958  Last data filed at 8/13/2022 0827  Gross per 24 hour   Intake 1138. 35 ml   Output 650 ml   Net 488.35 ml     Assessment/Plan:    Principal Problem:    Hematuria  Resolved Problems:    * No resolved hospital problems.  *      DVT prophylaxis: [] Lovenox                                 [] SCDs                                 [] SQ Heparin                                 [] Encourage ambulation           [x] Already on Anticoagulation  Code Status: Full Code    Anthony Quinones MD on 8/13/2022 at 9:58 AM  Rounding Hospitalist

## 2022-08-13 NOTE — ED NOTES
CBI started at this time. Fluids running at moderate speed. Pt tolerating well.       Aric Ramirez RN  08/12/22 2001

## 2022-08-13 NOTE — PLAN OF CARE
Problem: Discharge Planning  Goal: Discharge to home or other facility with appropriate resources  Outcome: Progressing  Flowsheets (Taken 8/13/2022 0011)  Discharge to home or other facility with appropriate resources:   Identify barriers to discharge with patient and caregiver   Arrange for needed discharge resources and transportation as appropriate     Problem: Pain  Goal: Verbalizes/displays adequate comfort level or baseline comfort level  Outcome: Progressing  Flowsheets (Taken 8/13/2022 0011)  Verbalizes/displays adequate comfort level or baseline comfort level:   Encourage patient to monitor pain and request assistance   Assess pain using appropriate pain scale   Implement non-pharmacological measures as appropriate and evaluate response     Problem: Safety - Adult  Goal: Free from fall injury  Outcome: Progressing  Flowsheets (Taken 8/13/2022 0011)  Free From Fall Injury: Instruct family/caregiver on patient safety

## 2022-08-13 NOTE — PROGRESS NOTES
Patient arrived per cart to 8A07. Assist to bed and vitals taken. Admission paperwork completed. Explained to patient that [facility] is not responsible for any lost or stolen items. Patient verbalized understanding. Oriented to room and use of call light and bed controls. Patient denies pain or needs. No signs of distress noted. Bed locked & in low position, side-rails up x2. Call light in reach. Reminded patient to call nurse if any needs arise. Explained patients right to have family, representative or physician notified of their admission. Patient has Declined for physician to be notified. Patient has Declined for family/representative to be notified. Son Anneliese Kline is at bedside. Care plan reviewed with patient. Patient verbalizes understanding of the care plan and contributed to goal setting.

## 2022-08-13 NOTE — ED NOTES
Patient transferred to [address] [place] [address] room 007 nurse informed.       Alberta Shoulders  08/12/22 2009

## 2022-08-13 NOTE — H&P
Hospitalist - History & Physical      Patient: Clarice Salazar    Unit/Bed:VIKY /VIKY  YOB: 1960  MRN: [id number]   Acct: [de-identified]   PCP: Eleazar Moyer MD    Date of Service: Pt seen/examined on 08/12/22  and Admitted to Inpatient with expected LOS greater than two midnights due to medical therapy. Chief Complaint:  Urinary retention    Assessment and Plan:  Gross hematuria, with urinary retention S/P  greenlight procedure:    Pt presented afebrile, non-toxic appearing, hemodynamically stable, with urinary retention. Centeno was placed, gross blood noted in centeno with immediate drainage and relief of abdominal pain. Follows with Dr. Robert Park- greenlight procedure 5/17/22. Continue to monitor H&H with daily CBC. Three-way centeno catheter placed with CBI ordered. Plan NPO at midnight for likely cystoscopy in the AM. Urology consulted. UTI:    PT afebrile, non-toxic appearing. WBC 13.2, UA positive nitrites, leukocytes, large blood, few bacteria. LA, Pro vita, Urine culture ordered. Will start Rocephin, gentle IVF. May consider trending LA and increase fluids vs fluid bolus if LA is elevated. T2DM:    Glucose 232 today. Does not appear to be on any home meds outpatient. Low dose SSI, hypoglycemia protocol ordered. Continue to monitor with daily BMP, POCT glucose checks. Carb limited diet. Essential HTN:    BP overall well controlled. Will continue home meds with holding parameters. and continue to monitor closely. History Of Present Illness:    Oneida Mendoza is a 61 y/o  male with a PMHx of HTN, T2DM, GEM who presents to [facility] ED today for the evaluation of urinary retention for 3 days. Pt states since Wednesday he has had intermittent difficulty urinating, with the passing of blood clots. He reports associated lower abdominal and flank pain on both sides, and groin pain. He states he has a history of enlarged prostate and follows with Dr. Robert Park outpatient.  In May he reportedly had a urologic procedure, and has not had difficulties until Wednesday with urinating. He states yesterday evening he noticed his symptoms worsen, and decided to come in today for further evaluation. Pt denies taking any anticoagulation. He reports he does take ASA daily. He denies fever, chills, nausea, vomiting, chest pain, shortness of breath. Past Medical History:        Diagnosis Date    Anxiety     Depression     Hypertension     Obesity     Type 2 diabetes mellitus without complication, without long-term current use of insulin ([address]     doesnt take any medications at this time     Unspecified sleep apnea     Wears CPAP       Past Surgical History:        Procedure Laterality Date    ABDOMEN SURGERY      APPENDECTOMY      BACK SURGERY      Herniated disc lumbar--Dr. Ministerio Verduzco. CHOLECYSTECTOMY      COLONOSCOPY      TURP N/A 5/17/2022    CYSTO, GREENLIGHT PHOTOVAPORIZATION OF THE PROSTATE performed by Eriberto Quinn MD at [facility] Medications:   No current facility-administered medications on file prior to encounter. Current Outpatient Medications on File Prior to Encounter   Medication Sig Dispense Refill    tamsulosin (FLOMAX) 0.4 MG capsule Take twice daily 60 capsule 0    ciprofloxacin (CIPRO) 500 MG tablet Take 1 tablet by mouth in the morning and 1 tablet before bedtime. Do all this for 10 days. 20 tablet 0    sertraline (ZOLOFT) 50 MG tablet TAKE 1 TABLET DAILY 90 tablet 3    hydroCHLOROthiazide (MICROZIDE) 12.5 MG capsule TAKE 1 CAPSULE DAILY 90 capsule 3    losartan (COZAAR) 100 MG tablet TAKE 1 TABLET DAILY 90 tablet 3    ONETOUCH VERIO strip USE TO TEST SUGAR TWICE A  strip 3    Multiple Vitamins-Minerals (MULTIVITAMIN PO) Take by mouth daily       blood glucose monitor kit and supplies One Touch Verio Glucometer. Sig: test sugar daily.  Dx: E11.9 1 kit 0    docusate sodium (COLACE) 100 MG capsule Take 100 mg by mouth as needed       Lancets MISC Lancet for Contour Next Lancing Device or dizziness, weakness, light-headedness and headaches. PHYSICAL EXAM:  /74   Pulse 94   Temp 98.4 °F (36.9 °C) (Oral)   Resp 17   SpO2 94%   General appearance: No apparent distress. Appears stated age and cooperative. Skin: Skin color, texture, turgor normal.  No rashes or lesions. HEENT: Normal cephalic, atraumatic without obvious deformity. Pupils equal, round, and reactive to light. Extra-ocular muscles intact. Conjunctivae/corneas clear. Neck: Trachea midline. Supple, with full range of motion. No jugular venous distention. Cardiovascular: Diminished heart sounds. Regular rate and rhythm with normal S1/S2. No murmurs, rubs or gallops. Respiratory:  Normal respiratory effort. Clear to auscultation, bilaterally without rales, wheezes, or rhonchi. Abdomen: Generalized tenderness to palpation of the lower abdominal quadrants. Soft, non-tender, non-distended. Normal bowel sounds. Musculoskeletal:  Scant non-pitting edema noted in LE bilaterally. No weakness or instability noted. No edema, erythema, or gross deformity noted. Vascular:  Pulses +2 palpable, equal bilaterally. Neurologic:  Neurovascularly intact without any focal sensory/motor deficits. Cranial nerves: II-XII grossly intact. Psychiatric: Alert and oriented, thought content appropriate, normal insight      Labs:   Recent Labs     08/12/22  1801   WBC 13.2*   HGB 13.3*   HCT 39.3*        Recent Labs     08/12/22  1801      K 3.7   CL 98   CO2 24   BUN 10   CREATININE 0.6   CALCIUM 9.9     Recent Labs     08/12/22  1801   AST 20   ALT 21   BILIDIR <0.2   BILITOT 0.6   ALKPHOS 92     Recent Labs     08/12/22  1801   INR 1.07     No results for input(s): [place] in the last 72 hours.     Urinalysis:    Lab Results   Component Value Date/Time    NITRU POSITIVE 08/12/2022 07:00 PM    WBCUA 25-50 08/12/2022 07:00 PM    BACTERIA FEW 08/12/2022 07:00 PM    RBCUA > 200 08/12/2022 07:00 PM    BLOODU LARGE

## 2022-08-14 LAB
ANION GAP SERPL CALCULATED.3IONS-SCNC: 10 MEQ/L (ref 8–16)
BASOPHILS # BLD: 0.3 %
BASOPHILS ABSOLUTE: 0 THOU/MM3 (ref 0–0.1)
BUN BLDV-MCNC: 11 MG/DL (ref 7–22)
CALCIUM SERPL-MCNC: 9.4 MG/DL (ref 8.5–10.5)
CHLORIDE BLD-SCNC: 102 MEQ/L (ref 98–111)
CO2: 27 MEQ/L (ref 23–33)
CREAT SERPL-MCNC: 0.5 MG/DL (ref 0.4–1.2)
EOSINOPHIL # BLD: 0.7 %
EOSINOPHILS ABSOLUTE: 0.1 THOU/MM3 (ref 0–0.4)
ERYTHROCYTE [DISTWIDTH] IN BLOOD BY AUTOMATED COUNT: 12.3 % (ref 11.5–14.5)
ERYTHROCYTE [DISTWIDTH] IN BLOOD BY AUTOMATED COUNT: 40 FL (ref 35–45)
GFR SERPL CREATININE-BSD FRML MDRD: > 90 ML/MIN/1.73M2
GLUCOSE BLD-MCNC: 162 MG/DL (ref 70–108)
GLUCOSE BLD-MCNC: 170 MG/DL (ref 70–108)
GLUCOSE BLD-MCNC: 182 MG/DL (ref 70–108)
GLUCOSE BLD-MCNC: 207 MG/DL (ref 70–108)
GLUCOSE BLD-MCNC: 215 MG/DL (ref 70–108)
HCT VFR BLD CALC: 37.9 % (ref 42–52)
HEMOGLOBIN: 12.6 GM/DL (ref 14–18)
IMMATURE GRANS (ABS): 0.03 THOU/MM3 (ref 0–0.07)
IMMATURE GRANULOCYTES: 0.3 %
LYMPHOCYTES # BLD: 6.6 %
LYMPHOCYTES ABSOLUTE: 0.7 THOU/MM3 (ref 1–4.8)
MCH RBC QN AUTO: 29.6 PG (ref 26–33)
MCHC RBC AUTO-ENTMCNC: 33.2 GM/DL (ref 32.2–35.5)
MCV RBC AUTO: 89.2 FL (ref 80–94)
MONOCYTES # BLD: 8.6 %
MONOCYTES ABSOLUTE: 0.9 THOU/MM3 (ref 0.4–1.3)
NUCLEATED RED BLOOD CELLS: 0 /100 WBC
PLATELET # BLD: 206 THOU/MM3 (ref 130–400)
PMV BLD AUTO: 9.8 FL (ref 9.4–12.4)
POTASSIUM REFLEX MAGNESIUM: 4.4 MEQ/L (ref 3.5–5.2)
POTASSIUM SERPL-SCNC: 4.4 MEQ/L (ref 3.5–5.2)
RBC # BLD: 4.25 MILL/MM3 (ref 4.7–6.1)
SEG NEUTROPHILS: 83.5 %
SEGMENTED NEUTROPHILS ABSOLUTE COUNT: 8.4 THOU/MM3 (ref 1.8–7.7)
SODIUM BLD-SCNC: 139 MEQ/L (ref 135–145)
WBC # BLD: 10 THOU/MM3 (ref 4.8–10.8)

## 2022-08-14 PROCEDURE — 1200000000 HC SEMI PRIVATE

## 2022-08-14 PROCEDURE — 6360000002 HC RX W HCPCS: Performed by: UROLOGY

## 2022-08-14 PROCEDURE — 2580000003 HC RX 258

## 2022-08-14 PROCEDURE — 6360000002 HC RX W HCPCS

## 2022-08-14 PROCEDURE — 85025 COMPLETE CBC W/AUTO DIFF WBC: CPT

## 2022-08-14 PROCEDURE — 80048 BASIC METABOLIC PNL TOTAL CA: CPT

## 2022-08-14 PROCEDURE — 99232 SBSQ HOSP IP/OBS MODERATE 35: CPT | Performed by: OPHTHALMOLOGY

## 2022-08-14 PROCEDURE — 82948 REAGENT STRIP/BLOOD GLUCOSE: CPT

## 2022-08-14 PROCEDURE — 6370000000 HC RX 637 (ALT 250 FOR IP)

## 2022-08-14 PROCEDURE — 6370000000 HC RX 637 (ALT 250 FOR IP): Performed by: OPHTHALMOLOGY

## 2022-08-14 PROCEDURE — 36415 COLL VENOUS BLD VENIPUNCTURE: CPT

## 2022-08-14 RX ORDER — BISACODYL 10 MG
10 SUPPOSITORY, RECTAL RECTAL PRN
Status: DISCONTINUED | OUTPATIENT
Start: 2022-08-14 | End: 2022-08-15 | Stop reason: HOSPADM

## 2022-08-14 RX ORDER — SENNA PLUS 8.6 MG/1
1 TABLET ORAL 2 TIMES DAILY
Status: DISCONTINUED | OUTPATIENT
Start: 2022-08-14 | End: 2022-08-15 | Stop reason: HOSPADM

## 2022-08-14 RX ADMIN — Medication 500 UNITS: at 09:45

## 2022-08-14 RX ADMIN — CEFTRIAXONE SODIUM 1000 MG: 1 INJECTION, POWDER, FOR SOLUTION INTRAMUSCULAR; INTRAVENOUS at 21:04

## 2022-08-14 RX ADMIN — TAMSULOSIN HYDROCHLORIDE 0.4 MG: 0.4 CAPSULE ORAL at 09:45

## 2022-08-14 RX ADMIN — SODIUM CHLORIDE, POTASSIUM CHLORIDE, SODIUM LACTATE AND CALCIUM CHLORIDE: 600; 310; 30; 20 INJECTION, SOLUTION INTRAVENOUS at 22:47

## 2022-08-14 RX ADMIN — SODIUM CHLORIDE, POTASSIUM CHLORIDE, SODIUM LACTATE AND CALCIUM CHLORIDE: 600; 310; 30; 20 INJECTION, SOLUTION INTRAVENOUS at 09:49

## 2022-08-14 RX ADMIN — SODIUM CHLORIDE, PRESERVATIVE FREE 10 ML: 5 INJECTION INTRAVENOUS at 21:01

## 2022-08-14 RX ADMIN — INSULIN LISPRO 1 UNITS: 100 INJECTION, SOLUTION INTRAVENOUS; SUBCUTANEOUS at 21:00

## 2022-08-14 RX ADMIN — HYDROCHLOROTHIAZIDE 12.5 MG: 12.5 CAPSULE ORAL at 09:44

## 2022-08-14 RX ADMIN — LOSARTAN POTASSIUM 100 MG: 100 TABLET, FILM COATED ORAL at 09:45

## 2022-08-14 RX ADMIN — SENNOSIDES 8.6 MG: 8.6 TABLET, FILM COATED ORAL at 21:01

## 2022-08-14 RX ADMIN — Medication 1 TABLET: at 09:44

## 2022-08-14 RX ADMIN — HYDROMORPHONE HYDROCHLORIDE 0.5 MG: 1 INJECTION, SOLUTION INTRAMUSCULAR; INTRAVENOUS; SUBCUTANEOUS at 18:36

## 2022-08-14 RX ADMIN — SERTRALINE 50 MG: 50 TABLET, FILM COATED ORAL at 09:45

## 2022-08-14 RX ADMIN — HYDROMORPHONE HYDROCHLORIDE 0.5 MG: 1 INJECTION, SOLUTION INTRAMUSCULAR; INTRAVENOUS; SUBCUTANEOUS at 10:33

## 2022-08-14 RX ADMIN — HYDROMORPHONE HYDROCHLORIDE 0.5 MG: 1 INJECTION, SOLUTION INTRAMUSCULAR; INTRAVENOUS; SUBCUTANEOUS at 22:44

## 2022-08-14 RX ADMIN — INSULIN LISPRO 1 UNITS: 100 INJECTION, SOLUTION INTRAVENOUS; SUBCUTANEOUS at 18:47

## 2022-08-14 RX ADMIN — SODIUM CHLORIDE, PRESERVATIVE FREE 10 ML: 5 INJECTION INTRAVENOUS at 09:45

## 2022-08-14 RX ADMIN — SENNOSIDES 8.6 MG: 8.6 TABLET, FILM COATED ORAL at 09:44

## 2022-08-14 RX ADMIN — SODIUM CHLORIDE, POTASSIUM CHLORIDE, SODIUM LACTATE AND CALCIUM CHLORIDE: 600; 310; 30; 20 INJECTION, SOLUTION INTRAVENOUS at 16:36

## 2022-08-14 RX ADMIN — SODIUM CHLORIDE, POTASSIUM CHLORIDE, SODIUM LACTATE AND CALCIUM CHLORIDE: 600; 310; 30; 20 INJECTION, SOLUTION INTRAVENOUS at 03:25

## 2022-08-14 ASSESSMENT — PAIN SCALES - GENERAL
PAINLEVEL_OUTOF10: 9
PAINLEVEL_OUTOF10: 0
PAINLEVEL_OUTOF10: 2
PAINLEVEL_OUTOF10: 9
PAINLEVEL_OUTOF10: 0
PAINLEVEL_OUTOF10: 8

## 2022-08-14 ASSESSMENT — PAIN DESCRIPTION - LOCATION
LOCATION: BACK;PENIS
LOCATION: BACK;PENIS
LOCATION: PENIS;BACK

## 2022-08-14 ASSESSMENT — PAIN DESCRIPTION - DESCRIPTORS
DESCRIPTORS: SHARP
DESCRIPTORS: SHARP;PRESSURE
DESCRIPTORS: THROBBING;SHARP

## 2022-08-14 ASSESSMENT — PAIN DESCRIPTION - ORIENTATION: ORIENTATION: LOWER

## 2022-08-14 NOTE — PROGRESS NOTES
Urology Progress Note           Progress Note For:  Sunday 8/14/2022   Problem(s) Being Addressed: Post Greenlight hematuria    Interval events since last Progress  Note: Required irrigation x 1 for clot      Subjective: Patient more comfortable    Objective: Urine medium red with CBI    Relevant labs and Imaging:     Lab Results   Component Value Date    WBC 10.0 08/14/2022    HGB 12.6 (L) 08/14/2022    HCT 37.9 (L) 08/14/2022    MCV 89.2 08/14/2022     08/14/2022          Impression: Resolving clot retention. Still suspect some remaining clot based on CT yesterday. No significant retained stool on CT.      Plan/Recommendations:  Recommend another day of CBI, reassess tomorrow AM, no indications for surgery at this time       Sherlyn Neves MD  10:03 AM 8/14/2022

## 2022-08-14 NOTE — PLAN OF CARE
Problem: Discharge Planning  Goal: Discharge to home or other facility with appropriate resources  Outcome: Progressing  Flowsheets (Taken 8/14/2022 0149)  Discharge to home or other facility with appropriate resources:   Identify barriers to discharge with patient and caregiver   Arrange for needed discharge resources and transportation as appropriate     Problem: Pain  Goal: Verbalizes/displays adequate comfort level or baseline comfort level  Outcome: Progressing  Flowsheets (Taken 8/14/2022 0149)  Verbalizes/displays adequate comfort level or baseline comfort level:   Encourage patient to monitor pain and request assistance   Assess pain using appropriate pain scale   Implement non-pharmacological measures as appropriate and evaluate response     Problem: Safety - Adult  Goal: Free from fall injury  Outcome: Progressing  Flowsheets (Taken 8/14/2022 0149)  Free From Fall Injury: Instruct family/caregiver on patient safety

## 2022-08-14 NOTE — PROGRESS NOTES
Hospitalist Progress Note    Patient:  Roberto Marks      Unit/Bed:8A-07/007-A    YOB: 1960    MRN: [id number]       Acct: [de-identified]     PCP: Stella Sandhoff, MD    Date of Admission: 8/12/2022    Chief Complaint:  Urinary retention     Assessment and Plan:  Gross hematuria, with urinary retention S/P  greenlight procedure:              Pt presented afebrile, non-toxic appearing, hemodynamically stable, with urinary retention. Centeno was placed, gross blood noted in centeno with immediate drainage and relief of abdominal pain. Follows with Dr. Darby Fleischer- greenlight procedure 5/17/22. Continue to monitor H&H with daily CBC. Three-way centeno catheter placed with CBI ordered. Urology is following: follow their recs     UTI:              PT afebrile, non-toxic appearing. WBC 13.2, UA positive nitrites, leukocytes, large blood, few bacteria. LA, Pro vita, Urine culture ordered. Will start Rocephin, gentle IVF. May consider trending LA and increase fluids vs fluid bolus if LA is elevated. T2DM:              Glucose 232 on admission. Does not appear to be on any home meds outpatient. Low dose SSI, hypoglycemia protocol ordered. Continue to monitor with daily BMP, POCT glucose checks. Carb limited diet. Essential HTN:              BP overall well controlled. Will continue home meds with holding parameters. and continue to monitor closely. History Of Present Illness:    Vi Caldwell is a 61 y/o  male with a PMHx of HTN, T2DM, GEM who presents to [facility] ED today for the evaluation of urinary retention for 3 days. Pt states since Wednesday he has had intermittent difficulty urinating, with the passing of blood clots. He reports associated lower abdominal and flank pain on both sides, and groin pain. He states he has a history of enlarged prostate and follows with Dr. Darby Fleischer outpatient. In May he reportedly had a urologic procedure, and has not had difficulties until Wednesday with urinating.  He states yesterday evening he noticed his symptoms worsen, and decided to come in today for further evaluation. Pt denies taking any anticoagulation. He reports he does take ASA daily. He denies fever, chills, nausea, vomiting, chest pain, shortness of breath. Subjective (past 24 hours)  Pain is under control: better since admission  Still with active gross hematuria  No chest pain, no SOB  Diet:  ADULT DIET;  Regular      Medications:  Scheduled Meds:   [Held by provider] aspirin  81 mg Oral Daily    hydroCHLOROthiazide  12.5 mg Oral Daily    losartan  100 mg Oral Daily    multivitamin  1 tablet Oral Daily    sertraline  50 mg Oral Daily    tamsulosin  0.4 mg Oral Daily    Vitamin D  500 Units Oral Daily    sodium chloride flush  10 mL IntraVENous 2 times per day    insulin lispro  0-4 Units SubCUTAneous 4x Daily PC & HS    cefTRIAXone (ROCEPHIN) IV  1,000 mg IntraVENous Q24H     Continuous Infusions:   sodium chloride      dextrose      lactated ringers 150 mL/hr at 08/14/22 0720     PRN Meds:HYDROmorphone, sodium chloride flush, sodium chloride, ondansetron **OR** ondansetron, polyethylene glycol, acetaminophen **OR** acetaminophen, potassium chloride **OR** potassium alternative oral replacement **OR** potassium chloride, magnesium sulfate, glucose, dextrose bolus **OR** dextrose bolus, glucagon (rDNA), dextrose    Objective:    Review of Labs and Diagnostic Testing:  Labs:     Recent Labs     08/12/22  1801 08/13/22  0739 08/14/22  0657   WBC 13.2* 10.6 10.0   HGB 13.3* 12.8* 12.6*   HCT 39.3* 38.8* 37.9*    231 206     Recent Labs     08/12/22  1801 08/13/22  0739 08/14/22  0657    141 139   K 3.7 4.3 4.4   CL 98 100 102   CO2 24 29 27   BUN 10 14 11   CREATININE 0.6 0.6 0.5   CALCIUM 9.9 9.8 9.4     Recent Labs     08/12/22  1801   AST 20   ALT 21   BILIDIR <0.2   BILITOT 0.6   ALKPHOS 92     No results found for: AMYLASE  Recent Labs     08/12/22  1801   INR 1.07     No results for input(s): TROPONINT in the last 72 hours. No results for input(s): BNP in the last 72 hours. Recent Labs     08/12/22  2144   LACTA 1.6     Lab Results   Component Value Date/Time    PROCAL 0.03 08/12/2022 06:01 PM     Lab Results   Component Value Date/Time    LABA1C 6.9 05/04/2021 12:31 PM       Urinalysis:   Lab Results   Component Value Date/Time    NITRU POSITIVE 08/12/2022 07:00 PM    WBCUA 25-50 08/12/2022 07:00 PM    BACTERIA FEW 08/12/2022 07:00 PM    RBCUA > 200 08/12/2022 07:00 PM    BLOODU LARGE 08/12/2022 07:00 PM    SPECGRAV <=1.005 08/12/2022 07:00 PM    GLUCOSEU Negative 03/08/2022 07:57 AM     Radiology:   Reviewed: see report for details  CXR: I have reviewed the CXR  EKG:  No acute changes:  CT ABDOMEN PELVIS WO CONTRAST Additional Contrast? None   Final Result       1. Marked prostatomegaly with Mendez catheter in place with prominent blood in the bladder. 2. Hepatic steatosis. 3. Mild colonic diverticulosis. **This report has been created using voice recognition software. It may contain minor errors which are inherent in voice recognition technology. **      Final report electronically signed by Dr. Altagracia Kennedy MD on 8/13/2022 4:32 PM          Physical Exam:  BP (!) 148/78   Pulse 79   Temp 98.7 °F (37.1 °C) (Oral)   Resp 16   Ht 6' (1.829 m)   Wt (!) 337 lb (152.9 kg)   SpO2 97%   BMI 45.71 kg/m²   General appearance - alert, well appearing, and in no distress   Chest - clear to auscultation, no wheezes, rales or rhonchi, symmetric air entry   Distant Breath Sounds: No   Heart - normal rate, regular rhythm, normal S1, S2, no murmurs, rubs, clicks or gallops   Abdomen - soft, not tender, nondistended, no masses or organomegaly   Obese: No; Protuberant:  Bowel sounds heard  Neurological - A&O 3 , normal speech, no focal findings or movement disorder noted   Musculoskeletal - no joint tenderness, deformity or swelling   Extremities - peripheral pulses normal, no pedal edema, no clubbing or cyanosis  24 hour intake/output:  Intake/Output Summary (Last 24 hours) at 8/14/2022 0749  Last data filed at 8/14/2022 0720  Gross per 24 hour   Intake 3965.32 ml   Output 1150 ml   Net 2815.32 ml     Assessment/Plan:    Principal Problem:    Hematuria  Resolved Problems:    * No resolved hospital problems.  *      DVT prophylaxis: [] Lovenox                                 [] SCDs                                 [] SQ Heparin                                 [] Encourage ambulation           [x] Already on Anticoagulation  Code Status: Full Code    Fredy Rodriguez MD on 8/14/2022 at 7:49 AM  Rounding Hospitalist

## 2022-08-14 NOTE — CARE COORDINATION
8/14/22, 1:26 PM EDT  DISCHARGE PLANNING EVALUATION:    Ramu Hansen       Admitted: 8/12/2022/ [address] day: 2   Location: 8A-07/007-A Reason for admit: Urinary retention [R33.9]  Hematuria [R31.9]  Hematuria, unspecified type [R31.9]   PMH:  has a past medical history of Anxiety, Depression, Hypertension, Obesity, Type 2 diabetes mellitus without complication, without long-term current use of insulin ([facility] [address] and Unspecified sleep apnea. Procedure:   8/13 CT abdomen/pelvis: 1. Marked prostatomegaly with Mendez catheter in place with prominent blood in the bladder. 2. Hepatic steatosis. 3. Mild colonic diverticulosis. Barriers to Discharge:  Presented with hematuria. Hospitalist and urology following. Mendez with CBI, plan to continue for today. No surgical intervention planned. Hgb 12.6. LR @ 150ml/hr. IV rocephin. Pain control. PCP: Rios Chery MD  Readmission Risk Score: 7.6%  Patient's Healthcare Decision Maker: Patient Declined (Legal Next of [address] [place] [address] as Decision Maker)    Patient Goals/Plan/Treatment Preferences: Spoke with Yamsin Godfrey, he is from home with his wife. He is independent and does not anticipate discharge needs. Transportation/Food Security/Housekeeping Addressed:  No issues identified.

## 2022-08-15 ENCOUNTER — TELEPHONE (OUTPATIENT)
Dept: UROLOGY | Age: 62
End: 2022-08-15

## 2022-08-15 VITALS
TEMPERATURE: 98.3 F | WEIGHT: 315 LBS | OXYGEN SATURATION: 93 % | HEIGHT: 72 IN | RESPIRATION RATE: 20 BRPM | BODY MASS INDEX: 42.66 KG/M2 | HEART RATE: 72 BPM | SYSTOLIC BLOOD PRESSURE: 140 MMHG | DIASTOLIC BLOOD PRESSURE: 67 MMHG

## 2022-08-15 PROBLEM — R33.9 URINARY RETENTION: Status: ACTIVE | Noted: 2022-08-15

## 2022-08-15 LAB
ANION GAP SERPL CALCULATED.3IONS-SCNC: 11 MEQ/L (ref 8–16)
AVERAGE GLUCOSE: 171 MG/DL (ref 70–126)
BASOPHILS # BLD: 0.5 %
BASOPHILS ABSOLUTE: 0 THOU/MM3 (ref 0–0.1)
BUN BLDV-MCNC: 10 MG/DL (ref 7–22)
CALCIUM SERPL-MCNC: 9.5 MG/DL (ref 8.5–10.5)
CHLORIDE BLD-SCNC: 100 MEQ/L (ref 98–111)
CO2: 27 MEQ/L (ref 23–33)
CREAT SERPL-MCNC: 0.7 MG/DL (ref 0.4–1.2)
EOSINOPHIL # BLD: 0.6 %
EOSINOPHILS ABSOLUTE: 0 THOU/MM3 (ref 0–0.4)
ERYTHROCYTE [DISTWIDTH] IN BLOOD BY AUTOMATED COUNT: 12.5 % (ref 11.5–14.5)
ERYTHROCYTE [DISTWIDTH] IN BLOOD BY AUTOMATED COUNT: 42.4 FL (ref 35–45)
GFR SERPL CREATININE-BSD FRML MDRD: > 90 ML/MIN/1.73M2
GLUCOSE BLD-MCNC: 154 MG/DL (ref 70–108)
GLUCOSE BLD-MCNC: 157 MG/DL (ref 70–108)
GLUCOSE BLD-MCNC: 163 MG/DL (ref 70–108)
HBA1C MFR BLD: 7.7 % (ref 4.4–6.4)
HCT VFR BLD CALC: 37.6 % (ref 42–52)
HEMOGLOBIN: 12.1 GM/DL (ref 14–18)
IMMATURE GRANS (ABS): 0.04 THOU/MM3 (ref 0–0.07)
IMMATURE GRANULOCYTES: 0.5 %
LYMPHOCYTES # BLD: 11.3 %
LYMPHOCYTES ABSOLUTE: 0.9 THOU/MM3 (ref 1–4.8)
MCH RBC QN AUTO: 29.8 PG (ref 26–33)
MCHC RBC AUTO-ENTMCNC: 32.2 GM/DL (ref 32.2–35.5)
MCV RBC AUTO: 92.6 FL (ref 80–94)
MONOCYTES # BLD: 13.1 %
MONOCYTES ABSOLUTE: 1.1 THOU/MM3 (ref 0.4–1.3)
NUCLEATED RED BLOOD CELLS: 0 /100 WBC
PLATELET # BLD: 189 THOU/MM3 (ref 130–400)
PMV BLD AUTO: 10.2 FL (ref 9.4–12.4)
POTASSIUM REFLEX MAGNESIUM: 4.4 MEQ/L (ref 3.5–5.2)
POTASSIUM SERPL-SCNC: 4.4 MEQ/L (ref 3.5–5.2)
RBC # BLD: 4.06 MILL/MM3 (ref 4.7–6.1)
SEG NEUTROPHILS: 74 %
SEGMENTED NEUTROPHILS ABSOLUTE COUNT: 6.1 THOU/MM3 (ref 1.8–7.7)
SODIUM BLD-SCNC: 138 MEQ/L (ref 135–145)
WBC # BLD: 8.2 THOU/MM3 (ref 4.8–10.8)

## 2022-08-15 PROCEDURE — 6370000000 HC RX 637 (ALT 250 FOR IP)

## 2022-08-15 PROCEDURE — 6370000000 HC RX 637 (ALT 250 FOR IP): Performed by: OPHTHALMOLOGY

## 2022-08-15 PROCEDURE — 99232 SBSQ HOSP IP/OBS MODERATE 35: CPT | Performed by: UROLOGY

## 2022-08-15 PROCEDURE — 85025 COMPLETE CBC W/AUTO DIFF WBC: CPT

## 2022-08-15 PROCEDURE — 99238 HOSP IP/OBS DSCHRG MGMT 30/<: CPT | Performed by: PHYSICIAN ASSISTANT

## 2022-08-15 PROCEDURE — 36415 COLL VENOUS BLD VENIPUNCTURE: CPT

## 2022-08-15 PROCEDURE — 80048 BASIC METABOLIC PNL TOTAL CA: CPT

## 2022-08-15 PROCEDURE — 82948 REAGENT STRIP/BLOOD GLUCOSE: CPT

## 2022-08-15 PROCEDURE — 83036 HEMOGLOBIN GLYCOSYLATED A1C: CPT

## 2022-08-15 PROCEDURE — 2580000003 HC RX 258

## 2022-08-15 RX ORDER — CEPHALEXIN 500 MG/1
500 CAPSULE ORAL 4 TIMES DAILY
Qty: 28 CAPSULE | Refills: 0 | Status: SHIPPED | OUTPATIENT
Start: 2022-08-15 | End: 2022-08-22

## 2022-08-15 RX ADMIN — SERTRALINE 50 MG: 50 TABLET, FILM COATED ORAL at 08:51

## 2022-08-15 RX ADMIN — SENNOSIDES 8.6 MG: 8.6 TABLET, FILM COATED ORAL at 08:51

## 2022-08-15 RX ADMIN — MAGNESIUM HYDROXIDE 30 ML: 400 SUSPENSION ORAL at 12:10

## 2022-08-15 RX ADMIN — SODIUM CHLORIDE, PRESERVATIVE FREE 10 ML: 5 INJECTION INTRAVENOUS at 08:51

## 2022-08-15 RX ADMIN — SODIUM CHLORIDE, POTASSIUM CHLORIDE, SODIUM LACTATE AND CALCIUM CHLORIDE: 600; 310; 30; 20 INJECTION, SOLUTION INTRAVENOUS at 11:38

## 2022-08-15 RX ADMIN — Medication 500 UNITS: at 08:51

## 2022-08-15 RX ADMIN — HYDROCHLOROTHIAZIDE 12.5 MG: 12.5 CAPSULE ORAL at 08:51

## 2022-08-15 RX ADMIN — TAMSULOSIN HYDROCHLORIDE 0.4 MG: 0.4 CAPSULE ORAL at 08:51

## 2022-08-15 RX ADMIN — LOSARTAN POTASSIUM 100 MG: 100 TABLET, FILM COATED ORAL at 08:51

## 2022-08-15 RX ADMIN — Medication 1 TABLET: at 08:51

## 2022-08-15 ASSESSMENT — PAIN SCALES - GENERAL: PAINLEVEL_OUTOF10: 0

## 2022-08-15 NOTE — PROGRESS NOTES
Urology Progress Note           Progress Note For:  Sunday 8/14/2022   Problem(s) Being Addressed: Post Greenlight hematuria    Interval events since last Progress  Note: Required irrigation x 1 for clot      Subjective: Patient more comfortable    Objective: Urine pink tinged with CBI at moderate rate    Relevant labs and Imaging:     Lab Results   Component Value Date    WBC 8.2 08/15/2022    HGB 12.1 (L) 08/15/2022    HCT 37.6 (L) 08/15/2022    MCV 92.6 08/15/2022     08/15/2022     General Appearance:  no acute distress  HEENT:  normal appearance  Cardiovascular:  no significant peripheral edema  Respiratory:  normal respiratory effort  Gastrointestinal:  abdomen is not distended and is consistent with BMI  Musculoskeletal:  normal range of motion to all extremities  Neurologic: no focal weakness; no tremor  Psychiatric: normal affect, normal train of thought, cooperative  : Centeno catheter in place. Pink to light red on slow CBI that readily clears when CBI increased.  Impression: Hand irrigated with copious solution. Able to evacuate many large clots. Urine is clear post irrigation     Plan/Recommendations:  No indication for surgery at this time. Will recheck urine color later today.   Has office visit tomorrow with Dr Luan Amador, APRN - CNP  8:37 AM 8/15/2022    Addendum 1200  Urine is clear  Ok for discharge with centeno catheter from Urology standpoint  Continue abx  Will plan for cystoscopy void trial in office in 1-2 wks  Discussed with Dr Heriberto Mendenhall  Urology signing off, please call with questions/concerns

## 2022-08-15 NOTE — FLOWSHEET NOTE
08/15/22 1315   Encounter Summary   Encounter Overview/Reason  Initial Encounter   Service Provided For: Patient   Referral/Consult From: Anupama   Last Encounter  08/15/22   Complexity of Encounter Moderate   Begin Time 1312   End Time  1320   Total Time Calculated 8 min   Encounter    Type Initial Screen/Assessment   Spiritual/Emotional needs   Type Spiritual Support   Assessment/Intervention/Outcome   Assessment Calm   Intervention Empowerment   Outcome Encouraged   Initial Spiritual Care Contact:     Jyoti Segura is in bed on 8A. He is hoping to be discharged later on today. He is a 60year old male who did not have any family in the room with him. His conversation with this  revealed that he is hoping for a follow up with his doctors after discharge. He welcomed prayer for his healing and was thankful.

## 2022-08-15 NOTE — CARE COORDINATION
8/15/22, 8:07 AM EDT    DISCHARGE ON [address] day: 3  Location: 8A-07/007-A Reason for admit: Urinary retention [R33.9]  Hematuria [R31.9]  Hematuria, unspecified type [R31.9]   Procedure:   8/13 CT abdomen/pelvis: 1. Marked prostatomegaly with Mendez catheter in place with prominent blood in the bladder. 2. Hepatic steatosis. 3. Mild colonic diverticulosis. Barriers to Discharge:  Hospitalist and Urology following. Hgb 12.1 (12.6). Tmax 100.0F. Room air, sats 91-92%. Mendez with CBI. Rocephin iv daily. PCP: Janet Sharma MD  Readmission Risk Score: 7.9%  Patient Goals/Plan/Treatment Preferences: Plans home with wife. Monitor for needs. 8/15/22, 12:20 PM EDT    Patient goals/plan/ treatment preferences discussed by  and . Patient goals/plan/ treatment preferences reviewed with patient/ family. Patient/ family verbalize understanding of discharge plan and are in agreement with goal/plan/treatment preferences. Understanding was demonstrated using the teach back method. AVS provided by RN at time of discharge, which includes all necessary medical information pertaining to the patients current course of illness, treatment, post-discharge goals of care, and treatment preferences.      Services At/After Discharge: None

## 2022-08-15 NOTE — PROGRESS NOTES
Pt to d/c home w/ wife   Reviewed AVS,s/s of Heart Attack and stroke w/ pt  Pt has Rx and instructed to take to pharmacy   Pt denies any q/c, SOB, chest pain, blurred vision, headache, n/v at this time  PVU  Pt left floor via transport

## 2022-08-15 NOTE — TELEPHONE ENCOUNTER
Per Michael Everett ok to come in on Friday instead of tomorrow for cysto and void trial. Called pt he will call back, if he does still want to be seen tomorrow he will need to be here at 8am not at his previously scheduled appointment.

## 2022-08-15 NOTE — TELEPHONE ENCOUNTER
Consult over the wkend for hematuria - rsolved  Has OV jada with Joan Beck  Please switch OV to cysto with void trial with Dr Joan Beck

## 2022-08-15 NOTE — PLAN OF CARE
Problem: Discharge Planning  Goal: Discharge to home or other facility with appropriate resources  Flowsheets (Taken 8/14/2022 0149 by Jake Cortés RN)  Discharge to home or other facility with appropriate resources:   Identify barriers to discharge with patient and caregiver   Arrange for needed discharge resources and transportation as appropriate  Note: Discharge home with any resources     Problem: Pain  Goal: Verbalizes/displays adequate comfort level or baseline comfort level  Flowsheets (Taken 8/14/2022 0730 by Kaushik Savage RN)  Verbalizes/displays adequate comfort level or baseline comfort level:   Encourage patient to monitor pain and request assistance   Assess pain using appropriate pain scale   Administer analgesics based on type and severity of pain and evaluate response  Note: Assessment and interventions for optimal pain control     Problem: Safety - Adult  Goal: Free from fall injury  Flowsheets (Taken 8/14/2022 0149 by Jake Cortés RN)  Free From Fall Injury: Instruct family/caregiver on patient safety  Note: Assessments and interventions for optimal safety      Problem: Chronic Conditions and Co-morbidities  Goal: Patient's chronic conditions and co-morbidity symptoms are monitored and maintained or improved  Flowsheets (Taken 8/14/2022 0328)  Care Plan - Patient's Chronic Conditions and Co-Morbidity Symptoms are Monitored and Maintained or Improved:   Monitor and assess patient's chronic conditions and comorbid symptoms for stability, deterioration, or improvement   Collaborate with multidisciplinary team to address chronic and comorbid conditions and prevent exacerbation or deterioration   Update acute care plan with appropriate goals if chronic or comorbid symptoms are exacerbated and prevent overall improvement and discharge  Note: Assessment and interventions for optimal health outcomes

## 2022-08-15 NOTE — H&P
Hospitalist Discharge Summary    Patient:  Anil Katz  YOB: 1960    MRN: [id number]   Acct: [de-identified]    Primary Care Physician: Raenelle Lesches, MD        Admit date:  8/12/2022    Discharge date:  8/15/2022      Discharge Diagnoses:   Hematuria    Discharge Medications:         Medication List        START taking these medications      cephALEXin 500 MG capsule  Commonly known as: KEFLEX  Take 1 capsule by mouth in the morning and 1 capsule at noon and 1 capsule in the evening and 1 capsule before bedtime. Do all this for 28 doses. CONTINUE taking these medications      blood glucose monitor kit and supplies  One Touch Verio Glucometer. Sig: test sugar daily. Dx: E11.9     CPAP Machine Misc  by Does not apply route Please change his Auto CPAP pressure settings to minimum pressure of 10cm H20 and maximum pressure of 14cm H20.     docusate sodium 100 MG capsule  Commonly known as: COLACE     fish oil 1000 MG Cpdr     hydroCHLOROthiazide 12.5 MG capsule  Commonly known as: MICROZIDE  TAKE 1 CAPSULE DAILY     Lancets Misc  Lancet for Contour Next Lancing Device or per patient preference. Sig: test sugar BID. Dx: E11.9     losartan 100 MG tablet  Commonly known as: COZAAR  TAKE 1 TABLET DAILY     MULTIVITAMIN PO     OneTouch Verio strip  Generic drug: blood glucose test strips  USE TO TEST SUGAR TWICE A DAY     RED YEAST RICE PO     sertraline 50 MG tablet  Commonly known as: ZOLOFT  TAKE 1 TABLET DAILY     sildenafil 100 MG tablet  Commonly known as: Viagra  Take 1/2 to 1 tab as needed, use as instructed.      tamsulosin 0.4 MG capsule  Commonly known as: FLOMAX  Take twice daily     vitamin D 400 units Tabs tablet  Commonly known as: CHOLECALCIFEROL            STOP taking these medications      aspirin 81 MG EC tablet     ciprofloxacin 500 MG tablet  Commonly known as: CIPRO               Where to Get Your Medications        You can get these medications from any pharmacy Bring a paper prescription for each of these medications  cephALEXin 500 MG capsule         Diet:  ADULT DIET; Regular    Activity:  Activity as tolerated (Patient may move about with assist as indicated or with supervision.)    Follow-up:  in the next few days with Deejay Proctor MD,  tomorrow with Dr. Sade Ponce in the office    Hospital Course: clinical course has improved    Pending Tests: none    Consultants: Urology    Procedures: CBI, hand irrigation    Diagnostic Tests: CT abd/pelvis    CBC:   Recent Labs     08/15/22  0530   WBC 8.2   HGB 12.1*        BMP:    Recent Labs     08/15/22  0530      K 4.4  4.4      CO2 27   BUN 10   CREATININE 0.7   GLUCOSE 163*     Calcium:  Recent Labs     08/15/22  0530   CALCIUM 9.5     Ionized Calcium:No results for input(s): IONCA in the last 72 hours. Magnesium:No results for input(s): MG in the last 72 hours. Phosphorus:No results for input(s): PHOS in the last 72 hours. BNP:No results for input(s): BNP in the last 72 hours. Glucose:  Recent Labs     08/15/22  0813   POCGLU 157*     HgbA1C: No results for input(s): LABA1C in the last 72 hours. INR:   Recent Labs     08/12/22  1801   INR 1.07     Hepatic:   Recent Labs     08/12/22  1801   ALKPHOS 92   ALT 21   AST 20   PROT 7.1   BILITOT 0.6   BILIDIR <0.2   LABALBU 4.2     Amylase and Lipase:  Recent Labs     08/12/22  2144   LACTA 1.6     Lactic Acid:   Recent Labs     08/12/22  2144   LACTA 1.6     Troponin: No results for input(s): CKTOTAL, CKMB, TROPONINT in the last 72 hours. BNP: No results for input(s): BNP in the last 72 hours. Lipids: No results for input(s): CHOL, TRIG, HDL, LDL, LDLCALC in the last 72 hours.   ABGs: No results found for: PHART, PO2ART, PRV7RXN, HQT9WGZ, BEART        Physical Exam:    Vitals:Patient Vitals for the past 24 hrs:   BP Temp Temp src Pulse Resp SpO2   08/15/22 1111 (!) 140/67 98.3 °F (36.8 °C) Oral 72 20 93 %   08/15/22 0800 (!) 150/76 98.8 °F (37.1 °C) Oral 73 16 92 %   08/15/22 0335 130/76 99.4 °F (37.4 °C) Oral 81 16 91 %   08/14/22 2314 -- -- -- -- 16 --   08/14/22 2244 -- -- -- -- 16 --   08/14/22 2100 130/70 100 °F (37.8 °C) Oral 94 -- 95 %   08/14/22 1836 -- -- -- -- 16 --   08/14/22 1515 (!) 148/75 97.8 °F (36.6 °C) Oral 79 16 94 %     Weight: Weight: (!) 337 lb (152.9 kg)     24 hour intake/output:  Intake/Output Summary (Last 24 hours) at 8/15/2022 1211  Last data filed at 8/15/2022 7006  Gross per 24 hour   Intake 610 ml   Output 200 ml   Net 410 ml       General appearance - alert, well appearing, and in no distress  Eyes - pupils equal and reactive, extraocular eye movements intact  Neck - supple, no significant adenopathy  Chest - clear to auscultation, no wheezes, rales or rhonchi, symmetric air entry  Distant Breath Sounds: No  Heart - normal rate, regular rhythm, normal S1, S2, no murmurs, rubs, clicks or gallops  Abdomen - soft, nontender, nondistended, no masses or organomegaly  Obese: Yes; Protuberant: Yes   Neurological - alert, oriented, normal speech, no focal findings or movement disorder noted  Musculoskeletal - no joint tenderness, deformity or swelling  Extremities - peripheral pulses normal, no pedal edema, no clubbing or cyanosis  Skin - normal coloration and turgor, no rashes, no suspicious skin lesions noted      Hospital Course: Per BONG Dora Fortune is a 59 y/o  male with a PMHx of HTN, T2DM, GEM who presents to [facility] ED today for the evaluation of urinary retention for 3 days. Pt states since Wednesday he has had intermittent difficulty urinating, with the passing of blood clots. He reports associated lower abdominal and flank pain on both sides, and groin pain. He states he has a history of enlarged prostate and follows with Dr. Armando Ramirez outpatient. In May he reportedly had a urologic procedure, and has not had difficulties until Wednesday with urinating.  He states yesterday evening he noticed his symptoms worsen, and decided to come in today for further evaluation. Pt denies taking any anticoagulation. He reports he does take ASA daily. He denies fever, chills, nausea, vomiting, chest pain, shortness of breath.  \"  8/14/22 -->  continue CBI  8/15/2022 -->  urine is clear after hand irrigation    Disposition: home    Condition: Stable    Time Spent: Less than 30 minutes        Madhu Levi, VERONICA  Discharging Hospitalist Physician Assistant

## 2022-08-15 NOTE — PROGRESS NOTES
Hospitalist Progress Note    Patient:  Jean Pierre Bartlett      Unit/Bed:8A-07/007-A    YOB: 1960    MRN: [id number]       Acct: [de-identified]     PCP: Janet Sharma MD    Date of Admission: 8/12/2022    Assessment/Plan:    Florian Level hematuria, with urinary retention S/P  greenlight procedure:         Pt presented afebrile, non-toxic appearing, hemodynamically stable, with urinary retention. Centeno was placed, gross blood noted in centeno with immediate drainage and relief of abdominal pain. Follows with Dr. Maco Vaz- greenlight procedure 5/17/22. Continue to monitor H&H with daily CBC. Three-way centeno catheter placed with CBI ordered. Urology is following: follow their recs   8/15/2022 --> overnight nurse hand irrigated some clots, this morning at the bedside urology PA hand irrigated thien hematuria, will continue CBI another day before attempting void trial     UTI:              PT afebrile, non-toxic appearing. WBC 13.2, UA positive nitrites, leukocytes, large blood, few bacteria. LA, Pro vita, Urine culture ordered. Will start Rocephin, gentle IVF. May consider trending LA and increase fluids vs fluid bolus if LA is elevated. 8/15/2022 --> no cultures on urine sample, continue empiric rocephin     T2DM:              Glucose 232 on admission. Does not appear to be on any home meds outpatient. Low dose SSI, hypoglycemia protocol ordered. Continue to monitor with daily BMP, POCT glucose checks. Carb limited diet. 8/15/2022 --> HA1C added, sugars still trending high, may need to add Lantus     Essential HTN:              BP overall well controlled. Will continue home meds with holding parameters. and continue to monitor closely. 8/15/2022 --> fairly well controlled for hospital setting, outpatient follow up recommended         Expected discharge date:  8/16/2022?     Disposition:    [x] Home       [] TCU       [] Rehab       [] Psych       [] SNF       [] [place]       [] Other-    Chief Complaint: hematuria    Hospital Course: Garland Johnson is a 59 y/o  male with a PMHx of HTN, T2DM, GEM who presents to [facility] ED today for the evaluation of urinary retention for 3 days. Pt states since Wednesday he has had intermittent difficulty urinating, with the passing of blood clots. He reports associated lower abdominal and flank pain on both sides, and groin pain. He states he has a history of enlarged prostate and follows with Dr. Danya Alcazar outpatient. In May he reportedly had a urologic procedure, and has not had difficulties until Wednesday with urinating. He states yesterday evening he noticed his symptoms worsen, and decided to come in today for further evaluation. Pt denies taking any anticoagulation. He reports he does take ASA daily. He denies fever, chills, nausea, vomiting, chest pain, shortness of breath.  \"       Subjective (past 24 hours):   CBI has been clearing but hand irrigation continues to yield clots       Medications:  Reviewed    Infusion Medications    sodium chloride      dextrose      lactated ringers 150 mL/hr at 08/14/22 2557     Scheduled Medications    senna  1 tablet Oral BID    [Held by provider] aspirin  81 mg Oral Daily    hydroCHLOROthiazide  12.5 mg Oral Daily    losartan  100 mg Oral Daily    multivitamin  1 tablet Oral Daily    sertraline  50 mg Oral Daily    tamsulosin  0.4 mg Oral Daily    Vitamin D  500 Units Oral Daily    sodium chloride flush  10 mL IntraVENous 2 times per day    insulin lispro  0-4 Units SubCUTAneous 4x Daily PC & HS    cefTRIAXone (ROCEPHIN) IV  1,000 mg IntraVENous Q24H     PRN Meds: bisacodyl, magnesium hydroxide, HYDROmorphone, sodium chloride flush, sodium chloride, ondansetron **OR** ondansetron, polyethylene glycol, acetaminophen **OR** acetaminophen, potassium chloride **OR** potassium alternative oral replacement **OR** potassium chloride, magnesium sulfate, glucose, dextrose bolus **OR** dextrose bolus, glucagon (rDNA), dextrose      Intake/Output Summary (Last 24 hours) at 8/15/2022 1010  Last data filed at 8/15/2022 1616  Gross per 24 hour   Intake 610 ml   Output -1650 ml   Net 2260 ml       Diet:  ADULT DIET; Regular    Exam:  BP (!) 150/76   Pulse 73   Temp 98.8 °F (37.1 °C) (Oral)   Resp 16   Ht 6' (1.829 m)   Wt (!) 337 lb (152.9 kg)   SpO2 92%   BMI 45.71 kg/m²     General appearance: No apparent distress, appears stated age and cooperative. HEENT: Pupils equal, round, and reactive to light. Conjunctivae/corneas clear. Neck: Supple, with full range of motion. No jugular venous distention. Trachea midline. Respiratory:  Normal respiratory effort. Clear to auscultation, bilaterally without Rales/Wheezes/Rhonchi. Cardiovascular: Regular rate and rhythm with normal S1/S2 without murmurs, rubs or gallops. Abdomen: Soft, non-tender, non-distended with normal bowel sounds. Musculoskeletal: passive and active ROM x 4 extremities. Skin: Skin color, texture, turgor normal.  No rashes or lesions. Neurologic:  Neurovascularly intact without any focal sensory/motor deficits. Cranial nerves: II-XII intact, grossly non-focal.  Psychiatric: Alert and oriented, thought content appropriate, normal insight  Capillary Refill: Brisk,< 3 seconds   Peripheral Pulses: +2 palpable, equal bilaterally       Labs:   Recent Labs     08/13/22  0739 08/14/22  0657 08/15/22  0530   WBC 10.6 10.0 8.2   HGB 12.8* 12.6* 12.1*   HCT 38.8* 37.9* 37.6*    206 189     Recent Labs     08/13/22  0739 08/14/22  0657 08/15/22  0530    139 138   K 4.3 4.4  4.4 4.4  4.4    102 100   CO2 29 27 27   BUN 14 11 10   CREATININE 0.6 0.5 0.7   CALCIUM 9.8 9.4 9.5     Recent Labs     08/12/22  1801   AST 20   ALT 21   BILIDIR <0.2   BILITOT 0.6   ALKPHOS 92     Recent Labs     08/12/22  1801   INR 1.07     No results for input(s): Gi Shanks in the last 72 hours.     Microbiology:      Urinalysis:      Lab Results   Component Value Date/Time    NITRU POSITIVE 08/12/2022 07:00 PM    WBCUA 25-50 08/12/2022 07:00 PM    BACTERIA FEW 08/12/2022 07:00 PM    RBCUA > 200 08/12/2022 07:00 PM    BLOODU LARGE 08/12/2022 07:00 PM    SPECGRAV <=1.005 08/12/2022 07:00 PM    GLUCOSEU Negative 03/08/2022 07:57 AM       Radiology:  CT ABDOMEN PELVIS WO CONTRAST Additional Contrast? None    Result Date: 8/13/2022  PROCEDURE: CT ABDOMEN PELVIS WO CONTRAST CLINICAL INFORMATION: hematuria . Pain. COMPARISON: CT abdomen pelvis dated 3/21/2014. TECHNIQUE: Axial 5 mm CT images were obtained through the abdomen and pelvis. No contrast was given. Coronal and sagittal reconstructions were created. All CT scans at this facility use dose modulation, iterative reconstruction, and/or weight-based dosing when appropriate to reduce radiation dose to as low as reasonably achievable. FINDINGS:  There is minimal atelectatic change at the lung bases. The visualized portion of the heart is unremarkable. The liver is diffusely hypodense without focal lesion identified. The gallbladder is surgically absent. Adrenal glands are unremarkable. There is nonspecific perinephric fat stranding bilaterally. Kidneys are otherwise unremarkable. The spleen and pancreas are unremarkable. No retroperitoneal or mesenteric lymphadenopathy is identified. There are scattered diverticula within the large bowel without adjacent inflammation to suggest diverticulitis. The appendix is surgically absent. Small bowel appears within normal limits. The prostate is markedly enlarged. There is a Mendez catheter in place. There is hyperdense blood within the bladder which is minimally distended. There is also small amount of air within the bladder. No free fluid is identified. There are stable degenerative changes of the lumbar spine. 1. Marked prostatomegaly with Mendez catheter in place with prominent blood in the bladder. 2. Hepatic steatosis. 3. Mild colonic diverticulosis.  **This report has been created using voice recognition software. It may contain minor errors which are inherent in voice recognition technology. ** Final report electronically signed by Dr. Kehinde Castellano MD on 8/13/2022 4:32 PM      DVT prophylaxis: [] Lovenox                                 [] SCDs                                 [] SQ Heparin                                 [x] Encourage ambulation           [] Already on Anticoagulation     Code Status: Full Code    PT/OT Eval Status: none    Tele:   [] yes             [x] no    Active Hospital Problems    Diagnosis Date Noted    Urinary retention [R33.9] 08/15/2022     Priority: Medium    Hematuria [R31.9] 08/12/2022     Priority: Medium       Electronically signed by Patrick Walsh PA-C on 8/15/2022 at 10:10 AM

## 2022-08-15 NOTE — PLAN OF CARE
Problem: Discharge Planning  Goal: Discharge to home or other facility with appropriate resources  8/15/2022 1256 by Lambert West RN  Outcome: Completed  Flowsheets (Taken 8/15/2022 0800)  Discharge to home or other facility with appropriate resources:   Identify barriers to discharge with patient and caregiver   Arrange for needed discharge resources and transportation as appropriate   Identify discharge learning needs (meds, wound care, etc)  8/14/2022 2309 by Paul Thakur RN  Flowsheets (Taken 8/14/2022 0149 by Luz Elena Arora RN)  Discharge to home or other facility with appropriate resources:   Identify barriers to discharge with patient and caregiver   Arrange for needed discharge resources and transportation as appropriate  Note: Discharge home with any resources

## 2022-08-16 ENCOUNTER — PROCEDURE VISIT (OUTPATIENT)
Dept: UROLOGY | Age: 62
End: 2022-08-16
Payer: COMMERCIAL

## 2022-08-16 ENCOUNTER — CARE COORDINATION (OUTPATIENT)
Dept: CASE MANAGEMENT | Age: 62
End: 2022-08-16

## 2022-08-16 ENCOUNTER — TELEPHONE (OUTPATIENT)
Dept: FAMILY MEDICINE CLINIC | Age: 62
End: 2022-08-16

## 2022-08-16 VITALS
SYSTOLIC BLOOD PRESSURE: 136 MMHG | DIASTOLIC BLOOD PRESSURE: 78 MMHG | HEIGHT: 72 IN | WEIGHT: 315 LBS | BODY MASS INDEX: 42.66 KG/M2

## 2022-08-16 DIAGNOSIS — R33.9 URINARY RETENTION: Primary | ICD-10-CM

## 2022-08-16 DIAGNOSIS — R31.9 HEMATURIA, UNSPECIFIED TYPE: Primary | ICD-10-CM

## 2022-08-16 PROCEDURE — 81003 URINALYSIS AUTO W/O SCOPE: CPT | Performed by: UROLOGY

## 2022-08-16 PROCEDURE — 52000 CYSTOURETHROSCOPY: CPT | Performed by: UROLOGY

## 2022-08-16 NOTE — DISCHARGE SUMMARY
Hospitalist Discharge Summary    Patient:  Asya Ortiz  YOB: 1960    MRN: [id number]   Acct: [de-identified]    Primary Care Physician: Kartik Barboza MD        Admit date:  8/12/2022    Discharge date:  8/15/2022      Discharge Diagnoses:   Hematuria    Discharge Medications:         Medication List        START taking these medications      cephALEXin 500 MG capsule  Commonly known as: KEFLEX  Take 1 capsule by mouth in the morning and 1 capsule at noon and 1 capsule in the evening and 1 capsule before bedtime. Do all this for 28 doses. CONTINUE taking these medications      blood glucose monitor kit and supplies  One Touch Verio Glucometer. Sig: test sugar daily. Dx: E11.9     CPAP Machine Misc  by Does not apply route Please change his Auto CPAP pressure settings to minimum pressure of 10cm H20 and maximum pressure of 14cm H20.     docusate sodium 100 MG capsule  Commonly known as: COLACE     fish oil 1000 MG Cpdr     hydroCHLOROthiazide 12.5 MG capsule  Commonly known as: MICROZIDE  TAKE 1 CAPSULE DAILY     Lancets Misc  Lancet for Contour Next Lancing Device or per patient preference. Sig: test sugar BID. Dx: E11.9     losartan 100 MG tablet  Commonly known as: COZAAR  TAKE 1 TABLET DAILY     MULTIVITAMIN PO     OneTouch Verio strip  Generic drug: blood glucose test strips  USE TO TEST SUGAR TWICE A DAY     RED YEAST RICE PO     sertraline 50 MG tablet  Commonly known as: ZOLOFT  TAKE 1 TABLET DAILY     sildenafil 100 MG tablet  Commonly known as: Viagra  Take 1/2 to 1 tab as needed, use as instructed.      tamsulosin 0.4 MG capsule  Commonly known as: FLOMAX  Take twice daily     vitamin D 400 units Tabs tablet  Commonly known as: CHOLECALCIFEROL            STOP taking these medications      aspirin 81 MG EC tablet     ciprofloxacin 500 MG tablet  Commonly known as: CIPRO               Where to Get Your Medications        You can get these medications from any pharmacy Bring a paper prescription for each of these medications  cephALEXin 500 MG capsule         Diet:  ADULT DIET; Regular    Activity:  Activity as tolerated (Patient may move about with assist as indicated or with supervision.)    Follow-up:  in the next few days with Kalpana Randall MD,  tomorrow with Dr. Delmy Blankenship in the office    Hospital Course: clinical course has improved    Pending Tests: none    Consultants: Urology    Procedures: CBI, hand irrigation    Diagnostic Tests: CT abd/pelvis    CBC:   Recent Labs     08/15/22  0530   WBC 8.2   HGB 12.1*        BMP:    Recent Labs     08/15/22  0530      K 4.4  4.4      CO2 27   BUN 10   CREATININE 0.7   GLUCOSE 163*     Calcium:  Recent Labs     08/15/22  0530   CALCIUM 9.5     Ionized Calcium:No results for input(s): IONCA in the last 72 hours. Magnesium:No results for input(s): MG in the last 72 hours. Phosphorus:No results for input(s): PHOS in the last 72 hours. BNP:No results for input(s): BNP in the last 72 hours. Glucose:  Recent Labs     08/15/22  0813   POCGLU 157*     HgbA1C: No results for input(s): LABA1C in the last 72 hours. INR:   Recent Labs     08/12/22  1801   INR 1.07     Hepatic:   Recent Labs     08/12/22  1801   ALKPHOS 92   ALT 21   AST 20   PROT 7.1   BILITOT 0.6   BILIDIR <0.2   LABALBU 4.2     Amylase and Lipase:  Recent Labs     08/12/22  2144   LACTA 1.6     Lactic Acid:   Recent Labs     08/12/22  2144   LACTA 1.6     Troponin: No results for input(s): CKTOTAL, CKMB, TROPONINT in the last 72 hours. BNP: No results for input(s): BNP in the last 72 hours. Lipids: No results for input(s): CHOL, TRIG, HDL, LDL, LDLCALC in the last 72 hours.   ABGs: No results found for: PHART, PO2ART, LHU0VRI, NNL8LSA, BEART        Physical Exam:    Vitals:Patient Vitals for the past 24 hrs:   BP Temp Temp src Pulse Resp SpO2   08/15/22 1111 (!) 140/67 98.3 °F (36.8 °C) Oral 72 20 93 %   08/15/22 0800 (!) 150/76 98.8 °F (37.1 °C) in today for further evaluation. Pt denies taking any anticoagulation. He reports he does take ASA daily. He denies fever, chills, nausea, vomiting, chest pain, shortness of breath.  \"  8/14/22 -->  continue CBI  8/15/2022 -->  urine is clear after hand irrigation    Disposition: home    Condition: Stable    Time Spent: Less than 30 minutes        Betty Brewster PA-C  Discharging Hospitalist Physician Assistant

## 2022-08-16 NOTE — PROGRESS NOTES
Cystoscopy    Operative Note    Patient:  Moshe Temple  MRN: [id number]  YOB: 1960    Date: 08/16/22  Surgeon: Leon Aviles MD  Anesthesia: Urojet Local  Indications: hematuria  Position: Supine  EBL: 0 ml    Findings:   The patient was prepped and draped in the usual sterile fashion. The flexible cystoscope was advanced through the urethra and into the bladder. The bladder was thoroughly inspected and the following was noted:    Residual Urine: minimal. urine cloudy and concerning for infection  Urethra: No abnormalities of the urethra are noted. Urethral dilation was not performed. Prostate:  open bladder neck with some calcifications on anterior tissue and lateral lobes. , intravesical extension of prostate not present. There was a previous TURP defect. Bladder: no tumor noted . Moderate trabeculation noted. no bladder diverticulum. Ureters: Orifices with normal configuration and location. The cystoscope was removed. The patient tolerated the procedure well.   Keep catheter out  Cont abx - finish keflex  F/u juju in 2-3 weeks

## 2022-08-16 NOTE — PROGRESS NOTES
30 cc of water deflated from centeno balloon. 25 Fr centeno removed without difficulty prior to cysto.

## 2022-08-16 NOTE — CARE COORDINATION
[facility] Transitions Initial Follow Up Call    Call within 2 business days of discharge: Yes    Patient: Rajat Price Patient : 1960   MRN: [id number]  Reason for Admission: Hematuria, Urinary retention  Discharge Date: 8/15/22 RARS: Readmission Risk Score: 7.9      Last Discharge [facility]       Date Complaint Diagnosis Description Type Department Provider    22 Urinary Retention;  Hasn't urinated  since Yesterday; UTI Urinary retention . .. [facility] Kelly Maravilla. .. Spoke with: Ephraim Goodwin, patient    Scheduled appointment with Specialist-had appt with urology on 22  Education of patient/family/caregiver/guardian to support self-management-when to contact providers    Contacted patient for initial care transition follow up. Spoke very briefly with patient. Quita Calhoun states that he is doing good so far. Denies having any abdominal/pelvic pain. No longer having any blood noted in urine. Denies having issues/difficulty with urinating. Had appointment with urology this morning. States he is doing fine. He is eating and drinking fluids w/o issues. Reviewed medication list.  1111F completed. Continues to take Keflex. Also reviewed stopped medications-Aspirin and Cipro. No questions regarding his medications. He had appointment with urology this morning. PCP office contacted patient regarding follow up appointment. According to notes, patient declines appointment and will call back to schedule. We briefly discussed when to contact providers. He verbalized understanding. No questions or needs at this time. Transitions of Care Initial Call    Was this an external facility discharge?  No Discharge Facility: [facility] to be reviewed by the provider   Additional needs identified to be addressed with provider: No  none             Method of communication with provider : none    Advance Care Planning:   Does patient have an Advance Directive: not on file. Care Transition Nurse contacted the patient by telephone to perform post hospital discharge assessment. Verified name and  with patient as identifiers. Provided introduction to self, and explanation of the CTN role. Medication reconciliation was performed with patient, who verbalizes understanding of administration of home medications. Was patient discharged with a pulse oximeter? no    CTN provided contact information. Plan for follow-up call in 5-7 days based on severity of symptoms and risk factors.   Plan for next call: symptom management-hematuria, urinary retention    Care Transitions 24 Hour Call    Do you have a copy of your discharge instructions?: Yes  Do you have all of your prescriptions and are they filled?: Yes  Have you been contacted by a [employer] [address]?: No  Have you scheduled your follow up appointment?: Yes  Do you feel like you have everything you need to keep you well at home?: Yes  Care Transitions Interventions         Follow Up  Future Appointments   Date Time Provider Kelly Lorenz   2022  8:45 AM Janie Robison, [address]   2023  [address] Shaji Knox, [address], RN

## 2022-08-16 NOTE — TELEPHONE ENCOUNTER
[facility] 45 Transitions Initial Follow Up Call    Outreach made within 2 business days of discharge: Yes    Patient: Anil Katz Patient : 1960   MRN: [id number]  Reason for Admission: There are no discharge diagnoses documented for the most recent discharge. Discharge Date: 8/15/22       Spoke with: Anil Katz    Discharge department/facility: [facility]    TCM Interactive Patient Contact:  Was patient able to fill all prescriptions: Yes  Was patient instructed to bring all medications to the follow-up visit: Yes  Is patient taking all medications as directed in the discharge summary? Yes  Does patient understand their discharge instructions: Yes  Does patient have questions or concerns that need addressed prior to 7-14 day follow up office visit: no    Scheduled appointment with PCP within 7-14 days    Follow Up  Future Appointments   Date Time Provider Kelly Lorenz   2022  8:45 AM Janie Robison  [facility]   2023  7:45 AM Kaveh Gabriel [address] [place] [address], [facility]      Anil Katz does not want to make a Follow-up appointment at this time due to he states that he is seeing his Urologist and will call back to schedule after that appointment.

## 2022-08-23 ENCOUNTER — CARE COORDINATION (OUTPATIENT)
Dept: CASE MANAGEMENT | Age: 62
End: 2022-08-23

## 2022-08-23 NOTE — CARE COORDINATION
[facility] 45 Transitions Follow Up Call    2022    Patient: Eben Arteaga  Patient : 1960   MRN: [id number]  Reason for Admission: Hematuria, Urinary retention  Discharge Date: 8/15/22 RARS: Readmission Risk Score: 7.9         Spoke with: Ann Ga, patient    Contacted patient for care transition follow up. Spoke briefly with patient. Kelle Ardon states that he is doing good. Denies having any urinary issues/difficulties. Denies having any blood noted in urine, fever, chills. He is eating and drinking fluids w/o issues. He has a follow up with urology on 22. He is aware of when to contact providers with any new or worsening symptoms. No questions or needs at this time. Care Transitions Follow Up Call    Needs to be reviewed by the provider   Additional needs identified to be addressed with provider: No  none             Method of communication with provider : none      Care Transition Nurse contacted the patient by telephone to follow up after admission on 22. Verified name and  with patient as identifiers. Addressed changes since last contact: none  Discussed follow-up appointments. If no appointment was previously scheduled, appointment scheduling offered: Yes. Is follow up appointment scheduled within 7 days of discharge? Yes. Patients top risk factors for readmission: medical condition-Hematuria, Urinary retention, Essential HTN, DM  Interventions to address risk factors: Education of patient/family/caregiver/guardian to support self-management-aware of when to contact providers    CTN provided contact information for future needs. Plan for follow-up call in 7-10 days based on severity of symptoms and risk factors.   Plan for next call: symptom management-hematuria, urinary retention      Care Transitions Subsequent and Final Call    Subsequent and Final Calls  Do you have any ongoing symptoms?: No  Have your medications changed?: No  Do you have any questions related to your medications?: No  Do you currently have any active services?: No  Do you have any needs or concerns that I can assist you with?: No  Care Transitions Interventions  Other Interventions:              Follow Up  Future Appointments   Date Time Provider Kelly Lorenz   9/8/2022  8:45 AM Arpan Juan MD [address] [facility]   7/31/2023  7:45 AM Maine Vera, [address], RN  Care Transition Nurse

## 2022-08-31 ENCOUNTER — CARE COORDINATION (OUTPATIENT)
Dept: CASE MANAGEMENT | Age: 62
End: 2022-08-31

## 2022-08-31 NOTE — CARE COORDINATION
[facility] 45 Transitions Follow Up Call    2022    Patient: Kendra Chand  Patient : 1960   MRN: [id number]  Reason for Admission: Hematuria, Urinary retention  Discharge Date: 8/15/22 RARS: Readmission Risk Score: 7.9         Spoke with: Asmita Shaw, patient    Contacted patient for final care transition follow up. Spoke very briefly with patient. Yunior Guthrie states that he is still doing well. Denies having any urinary issues/difficulties. Denies having any blood noted in urine. He is eating and drinking fluids w/o issues. He is aware of when to contact provider with any new or worsening symptoms. No questions or needs at this time. No further outreach. Care Transitions Follow Up Call    Needs to be reviewed by the provider   Additional needs identified to be addressed with provider: No  none             Method of communication with provider : none      Care Transition Nurse contacted the patient by telephone to follow up after admission on 22. Verified name and  with patient as identifiers. Addressed changes since last contact: none  Discussed follow-up appointments. If no appointment was previously scheduled, appointment scheduling offered: Yes. Is follow up appointment scheduled within 7 days of discharge? Yes. Patients top risk factors for readmission: Hematuria, Urinary retention, Essential HTN, DM  Interventions to address risk factors: Education of patient/family/caregiver/guardian to support self-management-aware of when to contact providers    CTN provided contact information for future needs. No further follow-up call indicated based on severity of symptoms and risk factors.     Care Transitions Subsequent and Final Call    Subsequent and Final Calls  Do you have any ongoing symptoms?: No  Do you have any questions related to your medications?: No  Do you currently have any active services?: No  Do you have any needs or concerns that I can assist you with?: No  Care Transitions Interventions  Other Interventions:              Follow Up  Future Appointments   Date Time Provider Kelly Lorenz   9/8/2022  8:45 AM Montez Oliveira MD [address] [facility] J[facility]DSON [place] [address]   7/31/2023  7:45 AM Robson Nash, [address], RN  Care Transition Nurse

## 2022-09-08 ENCOUNTER — OFFICE VISIT (OUTPATIENT)
Dept: UROLOGY | Age: 62
End: 2022-09-08
Payer: COMMERCIAL

## 2022-09-08 VITALS
HEIGHT: 72 IN | WEIGHT: 315 LBS | DIASTOLIC BLOOD PRESSURE: 88 MMHG | SYSTOLIC BLOOD PRESSURE: 138 MMHG | BODY MASS INDEX: 42.66 KG/M2

## 2022-09-08 DIAGNOSIS — N40.0 BENIGN PROSTATIC HYPERPLASIA, UNSPECIFIED WHETHER LOWER URINARY TRACT SYMPTOMS PRESENT: ICD-10-CM

## 2022-09-08 DIAGNOSIS — R97.20 ELEVATED PSA: ICD-10-CM

## 2022-09-08 DIAGNOSIS — R31.9 HEMATURIA, UNSPECIFIED TYPE: Primary | ICD-10-CM

## 2022-09-08 DIAGNOSIS — N52.9 ERECTILE DYSFUNCTION, UNSPECIFIED ERECTILE DYSFUNCTION TYPE: ICD-10-CM

## 2022-09-08 LAB
BILIRUBIN URINE: NEGATIVE
BLOOD URINE, POC: NEGATIVE
CHARACTER, URINE: CLEAR
COLOR, URINE: YELLOW
GLUCOSE URINE: NEGATIVE MG/DL
KETONES, URINE: NEGATIVE
LEUKOCYTE CLUMPS, URINE: ABNORMAL
NITRITE, URINE: NEGATIVE
PH, URINE: 5.5 (ref 5–9)
PROTEIN, URINE: 30 MG/DL
SPECIFIC GRAVITY, URINE: >= 1.03 (ref 1–1.03)
UROBILINOGEN, URINE: 0.2 EU/DL (ref 0–1)

## 2022-09-08 PROCEDURE — 99214 OFFICE O/P EST MOD 30 MIN: CPT | Performed by: UROLOGY

## 2022-09-08 PROCEDURE — 81003 URINALYSIS AUTO W/O SCOPE: CPT | Performed by: UROLOGY

## 2022-09-08 RX ORDER — ASCORBIC ACID 500 MG
500 TABLET ORAL DAILY
COMMUNITY

## 2022-09-08 NOTE — PROGRESS NOTES
[facility] [address] [place] [address]  [address] [place] [id number]  Dept: [phone]  Dept Fax: [phone]  Loc: [address] [facility] Urology Office Note -     Patient:  Roberto Marks  YOB: 1960    The patient is a 60 y.o. male who presents today for evaluation of the following problems:   Chief Complaint   Patient presents with    Hematuria        History of Present Illness:    Gross hematuria  Secondary to infections and dystrophic calcifications  No new hematuria    BPH  Hx of PVP  Last cystoscopy showed open bladder neck    Elevated PSA  No recent PSA  Last psa 4.9  Biopsy with Dr Vineet Mccain in past        Requested/reviewed records from Stella Sandhoff, MD office and/or outside [de-identified]    (Patient's old records have been requested, reviewed and pertinent findings summarized in today's note.)    Procedures Today: N/A    Last several PSA's:  Lab Results   Component Value Date    PSA 5.29 (H) 10/21/2020    PSA 5.01 (H) 02/28/2019    PSA 5.49 (H) 09/26/2018       Last total testosterone:  No results found for: TESTOSTERONE    Urinalysis today:  Results for POC orders placed in visit on 09/08/22   POCT Urinalysis No Micro (Auto)   Result Value Ref Range    Glucose, Ur Negative NEGATIVE mg/dl    Bilirubin Urine Negative     Ketones, Urine Negative NEGATIVE    Specific Gravity, Urine >= 1.030 1.002 - 1.030    Blood, UA POC Negative NEGATIVE    pH, Urine 5.50 5.0 - 9.0    Protein, Urine 30 (A) NEGATIVE mg/dl    Urobilinogen, Urine 0.20 0.0 - 1.0 eu/dl    Nitrite, Urine Negative NEGATIVE    Leukocyte Clumps, Urine Small (A) NEGATIVE    Color, Urine Yellow YELLOW-STRAW    Character, Urine Clear [id number]       Last BUN and creatinine:  Lab Results   Component Value Date    BUN 10 08/15/2022     Lab Results   Component Value Date    CREATININE 0.7 08/15/2022       Imaging Reviewed during this Office Visit:   BALDO KWON Vinay Guevara MD independently reviewed the images and verified the radiology reports from:    CT ABDOMEN PELVIS WO CONTRAST Additional Contrast? None    Result Date: 8/13/2022  PROCEDURE: CT ABDOMEN PELVIS WO CONTRAST CLINICAL INFORMATION: hematuria . Pain. COMPARISON: CT abdomen pelvis dated 3/21/2014. TECHNIQUE: Axial 5 mm CT images were obtained through the abdomen and pelvis. No contrast was given. Coronal and sagittal reconstructions were created. All CT scans at this facility use dose modulation, iterative reconstruction, and/or weight-based dosing when appropriate to reduce radiation dose to as low as reasonably achievable. FINDINGS:  There is minimal atelectatic change at the lung bases. The visualized portion of the heart is unremarkable. The liver is diffusely hypodense without focal lesion identified. The gallbladder is surgically absent. Adrenal glands are unremarkable. There is nonspecific perinephric fat stranding bilaterally. Kidneys are otherwise unremarkable. The spleen and pancreas are unremarkable. No retroperitoneal or mesenteric lymphadenopathy is identified. There are scattered diverticula within the large bowel without adjacent inflammation to suggest diverticulitis. The appendix is surgically absent. Small bowel appears within normal limits. The prostate is markedly enlarged. There is a Mendez catheter in place. There is hyperdense blood within the bladder which is minimally distended. There is also small amount of air within the bladder. No free fluid is identified. There are stable degenerative changes of the lumbar spine. 1. Marked prostatomegaly with Mendez catheter in place with prominent blood in the bladder. 2. Hepatic steatosis. 3. Mild colonic diverticulosis. **This report has been created using voice recognition software. It may contain minor errors which are inherent in voice recognition technology. ** Final report electronically signed by Dr. Marleny Phillips MD on 8/13/2022 4:32 PM      PAST MEDICAL, FAMILY AND SOCIAL HISTORY:  Past Medical History:   Diagnosis Date    Anxiety     Depression     Hypertension     Obesity     Type 2 diabetes mellitus without complication, without long-term current use of insulin ([address]     doesnt take any medications at this time     Unspecified sleep apnea     Wears CPAP     Past Surgical History:   Procedure Laterality Date    ABDOMEN SURGERY      APPENDECTOMY      BACK SURGERY      Herniated disc lumbar--Dr. Kacy Kohli. CHOLECYSTECTOMY      COLONOSCOPY      TURP N/A 5/17/2022    CYSTO, GREENLIGHT PHOTOVAPORIZATION OF THE PROSTATE performed by Montez Oliveira MD at [address] [place] [address] History   Problem Relation Age of Onset    COPD Mother     Diabetes Father     Heart Attack Father     Other Father         Peritonitis     Outpatient Medications Marked as Taking for the 9/8/22 encounter (Office Visit) with Montez Oliveira MD   Medication Sig Dispense Refill    vitamin C (ASCORBIC ACID) 500 MG tablet Take 500 mg by mouth daily      tamsulosin (FLOMAX) 0.4 MG capsule Take twice daily 60 capsule 0    sertraline (ZOLOFT) 50 MG tablet TAKE 1 TABLET DAILY 90 tablet 3    hydroCHLOROthiazide (MICROZIDE) 12.5 MG capsule TAKE 1 CAPSULE DAILY 90 capsule 3    losartan (COZAAR) 100 MG tablet TAKE 1 TABLET DAILY 90 tablet 3    ONETOUCH VERIO strip USE TO TEST SUGAR TWICE A  strip 3    blood glucose monitor kit and supplies One Touch Verio Glucometer. Sig: test sugar daily. Dx: E11.9 1 kit 0    docusate sodium (COLACE) 100 MG capsule Take 100 mg by mouth as needed       Lancets MISC Lancet for Contour Next Lancing Device or per patient preference. Sig: test sugar BID. Dx: E11.9 200 each 3    Red Yeast Rice Extract (RED YEAST RICE PO) Take 600 mg by mouth daily Taking 2 tabs daily      sildenafil (VIAGRA) 100 MG tablet Take 1/2 to 1 tab as needed, use as instructed.  10 tablet 5    CPAP Machine MISC by Does not apply route Please change his Auto CPAP pressure settings to minimum pressure of 10cm H20 and maximum pressure of 14cm H20. 1 each 0    vitamin D (CHOLECALCIFEROL) 400 UNITS TABS tablet Take 400 Units by mouth daily         Patient has no known allergies. Social History     Tobacco Use   Smoking Status Never   Smokeless Tobacco Never      (If patient a smoker, smoking cessation counseling offered)   Social History     Substance and Sexual Activity   Alcohol Use Yes    Alcohol/week: 0.0 standard drinks    Comment: occasional       REVIEW OF SYSTEMS:  Constitutional: negative  Eyes: negative  Respiratory: negative  Cardiovascular: negative  Gastrointestinal: negative  Genitourinary: see HPI  Musculoskeletal: negative  Skin: negative   Neurological: negative  Hematological/Lymphatic: negative  Psychological: negative        Physical Exam:    This a 60 y.o. male  Vitals:    09/08/22 0836   BP: 138/88     Body mass index is 45.24 kg/m². Constitutional: Patient in no acute distress;         Assessment and Plan        1. Hematuria, unspecified type    2. Benign prostatic hyperplasia, unspecified whether lower urinary tract symptoms present    3. Erectile dysfunction, unspecified erectile dysfunction type    4. Elevated PSA               Plan:        Gross hematuria- resolved  BPH- stop flomax. Improving flow. Does have dystrophic califications  ED- sildenafil PRN stable. Elevated psa- hx of biopsy in past. Psa stable. Recheck in six months      Prescriptions Ordered:  No orders of the defined types were placed in this encounter.      Orders Placed:  Orders Placed This Encounter   Procedures    POCT Urinalysis No Micro (Auto)            BALDO Chen MD

## 2023-02-28 ENCOUNTER — HOSPITAL ENCOUNTER (OUTPATIENT)
Age: 63
Discharge: HOME | End: 2023-02-28
Payer: COMMERCIAL

## 2023-02-28 DIAGNOSIS — R97.20 ELEVATED PSA: ICD-10-CM

## 2023-02-28 LAB — PSA SERPL-MCNC: 6.04 NG/ML (ref 0–1)

## 2023-02-28 PROCEDURE — 84153 ASSAY OF PSA TOTAL: CPT

## 2023-02-28 PROCEDURE — 36415 COLL VENOUS BLD VENIPUNCTURE: CPT

## 2023-03-01 ENCOUNTER — HOSPITAL ENCOUNTER (OUTPATIENT)
Age: 63
Discharge: HOME | End: 2023-03-01
Payer: COMMERCIAL

## 2023-03-01 ENCOUNTER — TELEPHONE (OUTPATIENT)
Dept: UROLOGY | Age: 63
End: 2023-03-01

## 2023-03-01 DIAGNOSIS — R31.9 HEMATURIA, UNSPECIFIED TYPE: ICD-10-CM

## 2023-03-01 DIAGNOSIS — R39.15 URGENCY OF URINATION: ICD-10-CM

## 2023-03-01 DIAGNOSIS — R31.9 HEMATURIA, UNSPECIFIED TYPE: Primary | ICD-10-CM

## 2023-03-01 LAB
BACTERIA: NORMAL
BILIRUB UR QL STRIP: NEGATIVE
CASTS #/AREA URNS LPF: NORMAL /LPF
CASTS #/AREA URNS LPF: NORMAL /LPF
CHARACTER UR: CLEAR
CHARCOAL URNS QL MICRO: NORMAL
COLOR UR: YELLOW
CRYSTALS URNS QL MICRO: NORMAL
EPITHELIAL CELLS, UA: NORMAL /HPF
GLUCOSE UR QL STRIP.AUTO: NEGATIVE MG/DL
HGB UR QL STRIP.AUTO: NEGATIVE
KETONES UR QL STRIP.AUTO: NEGATIVE
LEUKOCYTE ESTERASE UR QL STRIP.AUTO: NEGATIVE
NITRITE UR QL STRIP.AUTO: NEGATIVE
PH UR STRIP.AUTO: 7 [PH] (ref 5–9)
PROT UR STRIP.AUTO-MCNC: NEGATIVE MG/DL
RBC #/AREA URNS HPF: NORMAL /HPF
RENAL EPI CELLS #/AREA URNS HPF: NORMAL /[HPF]
SPECIFIC GRAVITY UA: 1.01 (ref 1–1.03)
UROBILINOGEN, URINE: 0.2 EU/DL (ref 0–1)
WBC #/AREA URNS HPF: NORMAL /HPF
YEAST LIKE FUNGI URNS QL MICRO: NORMAL

## 2023-03-01 PROCEDURE — 87086 URINE CULTURE/COLONY COUNT: CPT

## 2023-03-01 PROCEDURE — 81001 URINALYSIS AUTO W/SCOPE: CPT

## 2023-03-01 RX ORDER — SULFAMETHOXAZOLE AND TRIMETHOPRIM 800; 160 MG/1; MG/1
1 TABLET ORAL 2 TIMES DAILY
Qty: 20 TABLET | Refills: 0 | Status: SHIPPED | OUTPATIENT
Start: 2023-03-01 | End: 2023-03-11

## 2023-03-01 NOTE — TELEPHONE ENCOUNTER
Obtain urine sample prior to starting antibiotics. Will send in Bactrim BID x 10 days empirically. Will adjust pending urine culture. Advise to push fluids.      The patient should go to the ED if he develops fever, chills, nausea, vomiting, chest pain, SOB, calf pain, feelings of incomplete emptying, or should they otherwise feel they need evaluated

## 2023-03-01 NOTE — TELEPHONE ENCOUNTER
Patient thinks he has UTI. C/o hematuria and passed some clots last night. The urine is clear today.    C/o urgency and frequency. He denies fever, chills, and burning.

## 2023-03-01 NOTE — TELEPHONE ENCOUNTER
Patient advised to start bactrim after giving the urine sample to the lab and to push fluids. He voiced understanding. If he develops fever, chills, SOB, chest pain or unable to urinate to present to the ER.

## 2023-03-03 LAB
BACTERIA UR CULT: ABNORMAL
ORGANISM: ABNORMAL

## 2023-03-03 NOTE — TELEPHONE ENCOUNTER
Urine culture positive for growth of contaminants. Continue Bactrim as initially prescribed.      The patient should go to the ED if he develops fever, chills, nausea, vomiting, chest pain, SOB, calf pain, feelings of incomplete emptying, or should they otherwise feel they need evaluated

## 2023-03-06 NOTE — TELEPHONE ENCOUNTER
Patient was feeling really good. He did see some blood in the urine and a couple of clots yesterday. Today the urine is clear. The urgency and frequency is better but not completely resolved. He has enough Bactrim until Friday. He will continue the bactrim and discuss more at his appointment with Dr Delmy Blankenship on Thursday.

## 2023-03-09 ENCOUNTER — TELEPHONE (OUTPATIENT)
Dept: UROLOGY | Age: 63
End: 2023-03-09

## 2023-03-09 ENCOUNTER — OFFICE VISIT (OUTPATIENT)
Dept: UROLOGY | Age: 63
End: 2023-03-09
Payer: COMMERCIAL

## 2023-03-09 VITALS — HEIGHT: 72 IN | BODY MASS INDEX: 42.66 KG/M2 | WEIGHT: 315 LBS | RESPIRATION RATE: 18 BRPM

## 2023-03-09 DIAGNOSIS — R97.20 ELEVATED PSA: ICD-10-CM

## 2023-03-09 DIAGNOSIS — N40.0 BENIGN PROSTATIC HYPERPLASIA, UNSPECIFIED WHETHER LOWER URINARY TRACT SYMPTOMS PRESENT: ICD-10-CM

## 2023-03-09 DIAGNOSIS — N52.9 ERECTILE DYSFUNCTION, UNSPECIFIED ERECTILE DYSFUNCTION TYPE: Primary | ICD-10-CM

## 2023-03-09 PROCEDURE — 99214 OFFICE O/P EST MOD 30 MIN: CPT | Performed by: UROLOGY

## 2023-03-09 NOTE — PROGRESS NOTES
Terry  Yaakov Syed [facility] [id number]  Dept: [phone]  Dept Fax: [phone]  Loc: [address] [facility] Urology Office Note -     Patient:  Anil Katz  YOB: 1960    The patient is a 58 y.o. male who presents today for evaluation of the following problems:   Chief Complaint   Patient presents with    Benign Prostatic Hypertrophy    Elevated PSA        History of Present Illness:    Gross hematuria  Secondary to infections and dystrophic calcifications  Had hematuria recently    BPH  Hx of PVP  Last cystoscopy showed open bladder neck  Pt very happy with flow    Elevated PSA  Rising PSA but has been high for years  Last MRI 2019  Biopsy with Dr Jade Mohs in past        Requested/reviewed records from Raenelle Lesches, MD office and/or outside [de-identified]    (Patient's old records have been requested, reviewed and pertinent findings summarized in today's note.)    Procedures Today: N/A    Last several PSA's:  Lab Results   Component Value Date    PSA 6.04 (H) 02/28/2023    PSA 5.29 (H) 10/21/2020    PSA 5.01 (H) 02/28/2019       Last total testosterone:  No results found for: TESTOSTERONE    Urinalysis today:  No results found for this visit on 03/09/23. Last BUN and creatinine:  Lab Results   Component Value Date    BUN 10 08/15/2022     Lab Results   Component Value Date    CREATININE 0.7 08/15/2022       Imaging Reviewed during this Office Visit:   Sarabjit Zapien MD independently reviewed the images and verified the radiology reports from:    CT ABDOMEN PELVIS WO CONTRAST Additional Contrast? None    Result Date: 8/13/2022  PROCEDURE: CT ABDOMEN PELVIS WO CONTRAST CLINICAL INFORMATION: hematuria . Pain. COMPARISON: CT abdomen pelvis dated 3/21/2014. TECHNIQUE: Axial 5 mm CT images were obtained through the abdomen and pelvis. No contrast was given.  Coronal and sagittal reconstructions were created. All CT scans at this facility use dose modulation, iterative reconstruction, and/or weight-based dosing when appropriate to reduce radiation dose to as low as reasonably achievable. FINDINGS:  There is minimal atelectatic change at the lung bases. The visualized portion of the heart is unremarkable. The liver is diffusely hypodense without focal lesion identified. The gallbladder is surgically absent. Adrenal glands are unremarkable. There is nonspecific perinephric fat stranding bilaterally. Kidneys are otherwise unremarkable. The spleen and pancreas are unremarkable. No retroperitoneal or mesenteric lymphadenopathy is identified. There are scattered diverticula within the large bowel without adjacent inflammation to suggest diverticulitis. The appendix is surgically absent. Small bowel appears within normal limits. The prostate is markedly enlarged. There is a Mendez catheter in place. There is hyperdense blood within the bladder which is minimally distended. There is also small amount of air within the bladder. No free fluid is identified. There are stable degenerative changes of the lumbar spine.      1. Marked prostatomegaly with Mendez catheter in place with prominent blood in the bladder. 2. Hepatic steatosis. 3. Mild colonic diverticulosis. **This report has been created using voice recognition software. It may contain minor errors which are inherent in voice recognition technology.** Final report electronically signed by Dr. Osvaldo Bautista DO, MD on 8/13/2022 4:32 PM      PAST MEDICAL, FAMILY AND SOCIAL HISTORY:  Past Medical History:   Diagnosis Date    Anxiety     Depression     Hypertension     Obesity     Type 2 diabetes mellitus without complication, without long-term current use of insulin (HCC)     doesnt take any medications at this time     Unspecified sleep apnea     Wears CPAP     Past Surgical History:   Procedure Laterality Date    ABDOMEN SURGERY      APPENDECTOMY  BACK SURGERY      Herniated disc lumbar--Dr. Asha Lewis. CHOLECYSTECTOMY      COLONOSCOPY      TURP N/A 5/17/2022    CYSTO, GREENLIGHT PHOTOVAPORIZATION OF THE PROSTATE performed by Carter Mathews MD at [address] [place] [address] History   Problem Relation Age of Onset    COPD Mother     Diabetes Father     Heart Attack Father     Other Father         Peritonitis     Outpatient Medications Marked as Taking for the 3/9/23 encounter (Office Visit) with Carter Mathews MD   Medication Sig Dispense Refill    MISC NATURAL PRODUCT OP Apply to eye Eye victims      Turmeric 450 MG CAPS Take by mouth      Probiotic Product (CULTURELLE PROBIOTICS PO) Take by mouth      sulfamethoxazole-trimethoprim (BACTRIM DS;SEPTRA DS) 800-160 MG per tablet Take 1 tablet by mouth 2 times daily for 10 days 20 tablet 0    vitamin C (ASCORBIC ACID) 500 MG tablet Take 500 mg by mouth daily      sertraline (ZOLOFT) 50 MG tablet TAKE 1 TABLET DAILY 90 tablet 3    hydroCHLOROthiazide (MICROZIDE) 12.5 MG capsule TAKE 1 CAPSULE DAILY 90 capsule 3    losartan (COZAAR) 100 MG tablet TAKE 1 TABLET DAILY 90 tablet 3    ONETOUCH VERIO strip USE TO TEST SUGAR TWICE A  strip 3    blood glucose monitor kit and supplies One Touch Verio Glucometer. Sig: test sugar daily. Dx: E11.9 1 kit 0    docusate sodium (COLACE) 100 MG capsule Take 100 mg by mouth as needed       Lancets MISC Lancet for Contour Next Lancing Device or per patient preference. Sig: test sugar BID. Dx: E11.9 200 each 3    Red Yeast Rice Extract (RED YEAST RICE PO) Take 600 mg by mouth daily Taking 2 tabs daily      CPAP Machine MISC by Does not apply route Please change his Auto CPAP pressure settings to minimum pressure of 10cm H20 and maximum pressure of 14cm H20. 1 each 0    vitamin D (CHOLECALCIFEROL) 400 UNITS TABS tablet Take 400 Units by mouth daily         Patient has no known allergies.   Social History     Tobacco Use   Smoking Status Never   Smokeless Tobacco Never      (If patient a smoker, smoking cessation counseling offered)   Social History     Substance and Sexual Activity   Alcohol Use Yes    Alcohol/week: 0.0 standard drinks    Comment: occasional       REVIEW OF SYSTEMS:  Constitutional: negative  Eyes: negative  Respiratory: negative  Cardiovascular: negative  Gastrointestinal: negative  Genitourinary: see HPI  Musculoskeletal: negative  Skin: negative   Neurological: negative  Hematological/Lymphatic: negative  Psychological: negative        Physical Exam:    This a 58 y.o. male  Vitals:    03/09/23 0753   Resp: 18     Body mass index is 46.79 kg/m². Constitutional: Patient in no acute distress;         Assessment and Plan        1. Erectile dysfunction, unspecified erectile dysfunction type    2. Elevated PSA    3. Benign prostatic hyperplasia, unspecified whether lower urinary tract symptoms present                 Plan:        Gross hematuria- did have recent episode. Does have dystrophic calcifications. Its possible he is not having infections but its just residual calcifications/debris from previous PVP. Improve constipation. BPH- stop flomax. Very happy with flow  ED- sildenafil PRN stable. Elevated psa- hx of biopsy in past. Psa slight uptick. Discussed repeating workup vs cont monitoring. Will get prostate MRI. F/u with prostate MRI w eric      Prescriptions Ordered:  No orders of the defined types were placed in this encounter. Orders Placed:  No orders of the defined types were placed in this encounter.            Sarabjit Zapien MD

## 2023-03-09 NOTE — TELEPHONE ENCOUNTER
Patient scheduled for MRI PROSTATE W WO  at [facility] MR on 3/27/2023 ARRIVAL OF 7AM FOR A 730AM SCAN.     Order with instructions given to the patient in the office

## 2023-03-23 DIAGNOSIS — I10 HYPERTENSION, UNSPECIFIED TYPE: Primary | ICD-10-CM

## 2023-03-27 ENCOUNTER — HOSPITAL ENCOUNTER (OUTPATIENT)
Dept: MRI IMAGING | Age: 63
Discharge: HOME | End: 2023-03-27
Payer: COMMERCIAL

## 2023-03-27 DIAGNOSIS — R97.20 ELEVATED PSA: ICD-10-CM

## 2023-03-27 LAB — POC CREATININE WHOLE BLOOD: 0.6 MG/DL (ref 0.5–1.2)

## 2023-03-27 PROCEDURE — 6360000004 HC RX CONTRAST MEDICATION: Performed by: UROLOGY

## 2023-03-27 PROCEDURE — 82565 ASSAY OF CREATININE: CPT

## 2023-03-27 PROCEDURE — A9579 GAD-BASE MR CONTRAST NOS,1ML: HCPCS | Performed by: UROLOGY

## 2023-03-27 PROCEDURE — 76377 3D RENDER W/INTRP POSTPROCES: CPT

## 2023-03-27 RX ADMIN — GADOTERIDOL 20 ML: 279.3 INJECTION, SOLUTION INTRAVENOUS at 08:27

## 2023-03-28 RX ORDER — LOSARTAN POTASSIUM 100 MG/1
TABLET ORAL
Qty: 90 TABLET | Refills: 0 | Status: SHIPPED | OUTPATIENT
Start: 2023-03-28

## 2023-03-28 RX ORDER — HYDROCHLOROTHIAZIDE 12.5 MG/1
CAPSULE, GELATIN COATED ORAL
Qty: 90 CAPSULE | Refills: 0 | Status: SHIPPED | OUTPATIENT
Start: 2023-03-28

## 2023-04-13 ENCOUNTER — TELEPHONE (OUTPATIENT)
Dept: UROLOGY | Age: 63
End: 2023-04-13

## 2023-05-23 ENCOUNTER — SCHEDULED TELEPHONE ENCOUNTER (OUTPATIENT)
Dept: UROLOGY | Age: 63
End: 2023-05-23
Payer: COMMERCIAL

## 2023-05-23 DIAGNOSIS — R97.20 ELEVATED PSA: Primary | ICD-10-CM

## 2023-05-23 DIAGNOSIS — N52.9 ERECTILE DYSFUNCTION, UNSPECIFIED ERECTILE DYSFUNCTION TYPE: ICD-10-CM

## 2023-05-23 DIAGNOSIS — R31.9 HEMATURIA, UNSPECIFIED TYPE: ICD-10-CM

## 2023-05-23 DIAGNOSIS — N40.0 BENIGN PROSTATIC HYPERPLASIA, UNSPECIFIED WHETHER LOWER URINARY TRACT SYMPTOMS PRESENT: ICD-10-CM

## 2023-05-23 PROCEDURE — 98966 PH1 ASSMT&MGMT NQHP 5-10: CPT

## 2023-05-24 ENCOUNTER — TELEPHONE (OUTPATIENT)
Dept: UROLOGY | Age: 63
End: 2023-05-24

## 2023-06-12 ENCOUNTER — HOSPITAL ENCOUNTER (OUTPATIENT)
Age: 63
Discharge: HOME | End: 2023-06-12
Payer: COMMERCIAL

## 2023-06-12 DIAGNOSIS — E78.5 DYSLIPIDEMIA, GOAL LDL BELOW 70: ICD-10-CM

## 2023-06-12 DIAGNOSIS — I10 ESSENTIAL HYPERTENSION: ICD-10-CM

## 2023-06-12 DIAGNOSIS — E11.9 TYPE 2 DIABETES MELLITUS WITHOUT COMPLICATION, WITHOUT LONG-TERM CURRENT USE OF INSULIN (HCC): ICD-10-CM

## 2023-06-12 LAB
BASOPHILS ABSOLUTE: 0.1 THOU/MM3 (ref 0–0.1)
BASOPHILS NFR BLD AUTO: 0.7 %
CREAT UR-MCNC: 27.4 MG/DL
DEPRECATED RDW RBC AUTO: 42.3 FL (ref 35–45)
EOSINOPHIL NFR BLD AUTO: 1.2 %
EOSINOPHILS ABSOLUTE: 0.1 THOU/MM3 (ref 0–0.4)
ERYTHROCYTE [DISTWIDTH] IN BLOOD BY AUTOMATED COUNT: 12.2 % (ref 11.5–14.5)
HCT VFR BLD AUTO: 45.8 % (ref 42–52)
HGB BLD-MCNC: 14.5 GM/DL (ref 14–18)
IMM GRANULOCYTES # BLD AUTO: 0.01 THOU/MM3 (ref 0–0.07)
IMM GRANULOCYTES NFR BLD AUTO: 0.1 %
LYMPHOCYTES ABSOLUTE: 1.3 THOU/MM3 (ref 1–4.8)
LYMPHOCYTES NFR BLD AUTO: 17.7 %
MCH RBC QN AUTO: 29.8 PG (ref 26–33)
MCHC RBC AUTO-ENTMCNC: 31.7 GM/DL (ref 32.2–35.5)
MCV RBC AUTO: 94 FL (ref 80–94)
MICROALBUMIN UR-MCNC: 1.3 MG/DL
MICROALBUMIN/CREAT RATIO PNL UR: 47 MG/G (ref 0–30)
MONOCYTES ABSOLUTE: 0.6 THOU/MM3 (ref 0.4–1.3)
MONOCYTES NFR BLD AUTO: 7.3 %
NEUTROPHILS NFR BLD AUTO: 73 %
NRBC BLD AUTO-RTO: 0 /100 WBC
PLATELET # BLD AUTO: 259 THOU/MM3 (ref 130–400)
PMV BLD AUTO: 10.5 FL (ref 9.4–12.4)
RBC # BLD AUTO: 4.87 MILL/MM3 (ref 4.7–6.1)
SEGMENTED NEUTROPHILS ABSOLUTE COUNT: 5.5 THOU/MM3 (ref 1.8–7.7)
WBC # BLD AUTO: 7.6 THOU/MM3 (ref 4.8–10.8)

## 2023-06-12 PROCEDURE — 82043 UR ALBUMIN QUANTITATIVE: CPT

## 2023-06-12 PROCEDURE — 36415 COLL VENOUS BLD VENIPUNCTURE: CPT

## 2023-06-12 PROCEDURE — 84439 ASSAY OF FREE THYROXINE: CPT

## 2023-06-12 PROCEDURE — 83036 HEMOGLOBIN GLYCOSYLATED A1C: CPT

## 2023-06-12 PROCEDURE — 85025 COMPLETE CBC W/AUTO DIFF WBC: CPT

## 2023-06-12 PROCEDURE — 80061 LIPID PANEL: CPT

## 2023-06-12 PROCEDURE — 84443 ASSAY THYROID STIM HORMONE: CPT

## 2023-06-12 PROCEDURE — 80053 COMPREHEN METABOLIC PANEL: CPT

## 2023-06-13 LAB
ALBUMIN SERPL BCG-MCNC: 5 G/DL (ref 3.5–5.1)
ALP SERPL-CCNC: 106 U/L (ref 38–126)
ALT SERPL W/O P-5'-P-CCNC: 22 U/L (ref 11–66)
ANION GAP SERPL CALC-SCNC: 16 MEQ/L (ref 8–16)
AST SERPL-CCNC: 20 U/L (ref 5–40)
BILIRUB SERPL-MCNC: 0.7 MG/DL (ref 0.3–1.2)
BUN SERPL-MCNC: 12 MG/DL (ref 7–22)
CALCIUM SERPL-MCNC: 10 MG/DL (ref 8.5–10.5)
CHLORIDE SERPL-SCNC: 97 MEQ/L (ref 98–111)
CHOLEST SERPL-MCNC: 190 MG/DL (ref 100–199)
CO2 SERPL-SCNC: 27 MEQ/L (ref 23–33)
CREAT SERPL-MCNC: 0.6 MG/DL (ref 0.4–1.2)
DEPRECATED MEAN GLUCOSE BLD GHB EST-ACNC: 162 MG/DL (ref 70–126)
GFR SERPL CREATININE-BSD FRML MDRD: > 60 ML/MIN/1.73M2
GLUCOSE SERPL-MCNC: 162 MG/DL (ref 70–108)
HBA1C MFR BLD HPLC: 7.4 % (ref 4.4–6.4)
HDLC SERPL-MCNC: 52 MG/DL
LDLC SERPL CALC-MCNC: 115 MG/DL
POTASSIUM SERPL-SCNC: 4.4 MEQ/L (ref 3.5–5.2)
PROT SERPL-MCNC: 7.8 G/DL (ref 6.1–8)
SODIUM SERPL-SCNC: 140 MEQ/L (ref 135–145)
T4 FREE SERPL-MCNC: 1.03 NG/DL (ref 0.93–1.76)
TRIGL SERPL-MCNC: 113 MG/DL (ref 0–199)
TSH SERPL DL<=0.005 MIU/L-ACNC: 1.84 UIU/ML (ref 0.4–4.2)

## 2023-06-15 ENCOUNTER — TELEPHONE (OUTPATIENT)
Dept: UROLOGY | Age: 63
End: 2023-06-15

## 2023-06-15 RX ORDER — AMOXICILLIN AND CLAVULANATE POTASSIUM 875; 125 MG/1; MG/1
1 TABLET, FILM COATED ORAL 2 TIMES DAILY
Qty: 20 TABLET | Refills: 0 | Status: SHIPPED | OUTPATIENT
Start: 2023-06-15 | End: 2023-06-25

## 2023-06-21 DIAGNOSIS — I10 HYPERTENSION, UNSPECIFIED TYPE: ICD-10-CM

## 2023-06-21 NOTE — TELEPHONE ENCOUNTER
Patient's last appointment was : 06/13/23  Patient's next appointment is :  07/14/23  Last refilled: 3/29/2023  Pharmacy Verified    Lab Results   Component Value Date    LABA1C 7.4 (H) 06/12/2023     Lab Results   Component Value Date    CHOL 190 06/12/2023    TRIG 113 06/12/2023    HDL 52 06/12/2023    LDLCALC 115 06/12/2023    LDLDIRECT 110.54 10/21/2020     Lab Results   Component Value Date     06/12/2023    K 4.4 06/12/2023    CL 97 (L) 06/12/2023    CO2 27 06/12/2023    BUN 12 06/12/2023    CREATININE 0.6 06/12/2023    GLUCOSE 162 (H) 06/12/2023    CALCIUM 10.0 06/12/2023    PROT 7.8 06/12/2023    LABALBU 5.0 06/12/2023    BILITOT 0.7 06/12/2023    ALKPHOS 106 06/12/2023    AST 20 06/12/2023    ALT 22 06/12/2023    LABGLOM >60 06/12/2023     Lab Results   Component Value Date    TSH 1.840 06/12/2023    T4FREE 1.03 06/12/2023     Lab Results   Component Value Date    WBC 7.6 06/12/2023    HGB 14.5 06/12/2023    HCT 45.8 06/12/2023    MCV 94.0 06/12/2023     06/12/2023

## 2023-06-23 RX ORDER — HYDROCHLOROTHIAZIDE 12.5 MG/1
CAPSULE, GELATIN COATED ORAL
Qty: 90 CAPSULE | Refills: 3 | Status: SHIPPED | OUTPATIENT
Start: 2023-06-23

## 2023-06-23 RX ORDER — LOSARTAN POTASSIUM 100 MG/1
TABLET ORAL
Qty: 90 TABLET | Refills: 3 | Status: SHIPPED | OUTPATIENT
Start: 2023-06-23

## 2023-07-03 DIAGNOSIS — E11.9 TYPE 2 DIABETES MELLITUS WITHOUT COMPLICATION, UNSPECIFIED WHETHER LONG TERM INSULIN USE (HCC): ICD-10-CM

## 2023-07-03 RX ORDER — METFORMIN HYDROCHLORIDE 500 MG/1
500 TABLET, EXTENDED RELEASE ORAL 2 TIMES DAILY
Qty: 30 TABLET | Refills: 1 | Status: SHIPPED | OUTPATIENT
Start: 2023-07-03

## 2023-07-03 NOTE — TELEPHONE ENCOUNTER
Patient's last appointment was : 06/13/23  Patient's next appointment is :  07/14/23  Pharmacy Verified    Lab Results   Component Value Date    LABA1C 7.4 (H) 06/12/2023     Lab Results   Component Value Date    CHOL 190 06/12/2023    TRIG 113 06/12/2023    HDL 52 06/12/2023    LDLCALC 115 06/12/2023    LDLDIRECT 110.54 10/21/2020     Lab Results   Component Value Date     06/12/2023    K 4.4 06/12/2023    CL 97 (L) 06/12/2023    CO2 27 06/12/2023    BUN 12 06/12/2023    CREATININE 0.6 06/12/2023    GLUCOSE 162 (H) 06/12/2023    CALCIUM 10.0 06/12/2023    PROT 7.8 06/12/2023    LABALBU 5.0 06/12/2023    BILITOT 0.7 06/12/2023    ALKPHOS 106 06/12/2023    AST 20 06/12/2023    ALT 22 06/12/2023    LABGLOM >60 06/12/2023     Lab Results   Component Value Date    TSH 1.840 06/12/2023    T4FREE 1.03 06/12/2023     Lab Results   Component Value Date    WBC 7.6 06/12/2023    HGB 14.5 06/12/2023    HCT 45.8 06/12/2023    MCV 94.0 06/12/2023     06/12/2023

## 2023-07-04 DIAGNOSIS — F32.A DEPRESSION, UNSPECIFIED DEPRESSION TYPE: Primary | ICD-10-CM

## 2023-07-12 ENCOUNTER — TELEPHONE (OUTPATIENT)
Dept: UROLOGY | Age: 63
End: 2023-07-12

## 2023-07-12 ENCOUNTER — HOSPITAL ENCOUNTER (OUTPATIENT)
Age: 63
Discharge: HOME | End: 2023-07-12
Payer: COMMERCIAL

## 2023-07-12 DIAGNOSIS — R31.9 HEMATURIA, UNSPECIFIED TYPE: ICD-10-CM

## 2023-07-12 DIAGNOSIS — R31.9 HEMATURIA, UNSPECIFIED TYPE: Primary | ICD-10-CM

## 2023-07-12 LAB
AMORPH SED URNS QL MICRO: ABNORMAL
BACTERIA: ABNORMAL
BILIRUB UR QL STRIP: ABNORMAL
CASTS #/AREA URNS LPF: ABNORMAL /LPF
CHARACTER UR: ABNORMAL
COLOR UR: ABNORMAL
CRYSTALS URNS QL MICRO: ABNORMAL
EPITHELIAL CELLS, UA: ABNORMAL /HPF
GLUCOSE UR QL STRIP.AUTO: 100 MG/DL
HGB UR QL STRIP.AUTO: ABNORMAL
ICTOTEST: NEGATIVE
KETONES UR QL STRIP.AUTO: 15
LEUKOCYTE ESTERASE UR QL STRIP.AUTO: ABNORMAL
MUCOUS THREADS URNS QL MICRO: ABNORMAL
NITRITE UR QL STRIP.AUTO: POSITIVE
PH UR STRIP.AUTO: 6.5 [PH] (ref 5–9)
PROT UR STRIP.AUTO-MCNC: >= 300 MG/DL
RBC #/AREA URNS HPF: > 200 /HPF
SPECIFIC GRAVITY UA: 1.02 (ref 1–1.03)
UROBILINOGEN, URINE: 2 EU/DL (ref 0–1)
WBC #/AREA URNS HPF: ABNORMAL /HPF

## 2023-07-12 PROCEDURE — 87086 URINE CULTURE/COLONY COUNT: CPT

## 2023-07-12 PROCEDURE — 81001 URINALYSIS AUTO W/SCOPE: CPT

## 2023-07-12 NOTE — TELEPHONE ENCOUNTER
Around 5:30 started having blood in urine. Had pretty good size clot and a few smaller ones. Pt takes no blood thinners. No fever or chills does have an odor    Urine ordered. Encouraged to push fluids. To present to the er for fever chills or shortness of breath.

## 2023-07-13 ENCOUNTER — TELEPHONE (OUTPATIENT)
Dept: UROLOGY | Age: 63
End: 2023-07-13

## 2023-07-13 RX ORDER — NITROFURANTOIN 25; 75 MG/1; MG/1
100 CAPSULE ORAL 2 TIMES DAILY
Qty: 20 CAPSULE | Refills: 0 | Status: SHIPPED | OUTPATIENT
Start: 2023-07-13 | End: 2023-07-23

## 2023-07-14 ENCOUNTER — OFFICE VISIT (OUTPATIENT)
Dept: FAMILY MEDICINE CLINIC | Age: 63
End: 2023-07-14
Payer: COMMERCIAL

## 2023-07-14 VITALS
DIASTOLIC BLOOD PRESSURE: 78 MMHG | SYSTOLIC BLOOD PRESSURE: 136 MMHG | HEART RATE: 75 BPM | RESPIRATION RATE: 20 BRPM | BODY MASS INDEX: 42.66 KG/M2 | WEIGHT: 315 LBS | OXYGEN SATURATION: 95 % | HEIGHT: 72 IN | TEMPERATURE: 97.8 F

## 2023-07-14 DIAGNOSIS — R33.9 URINARY RETENTION: ICD-10-CM

## 2023-07-14 DIAGNOSIS — I10 ESSENTIAL HYPERTENSION: ICD-10-CM

## 2023-07-14 DIAGNOSIS — E66.01 OBESITY, CLASS III, BMI 40-49.9 (MORBID OBESITY) (HCC): ICD-10-CM

## 2023-07-14 DIAGNOSIS — N40.0 PROSTATE HYPERTROPHY: ICD-10-CM

## 2023-07-14 DIAGNOSIS — E11.9 TYPE 2 DIABETES MELLITUS WITHOUT COMPLICATION, WITHOUT LONG-TERM CURRENT USE OF INSULIN (HCC): Primary | ICD-10-CM

## 2023-07-14 DIAGNOSIS — Z91.89 RISK FOR CORONARY ARTERY DISEASE GREATER THAN 20% IN NEXT 10 YEARS: ICD-10-CM

## 2023-07-14 LAB — BACTERIA UR CULT: NORMAL

## 2023-07-14 PROCEDURE — 3051F HG A1C>EQUAL 7.0%<8.0%: CPT | Performed by: STUDENT IN AN ORGANIZED HEALTH CARE EDUCATION/TRAINING PROGRAM

## 2023-07-14 PROCEDURE — 3078F DIAST BP <80 MM HG: CPT | Performed by: STUDENT IN AN ORGANIZED HEALTH CARE EDUCATION/TRAINING PROGRAM

## 2023-07-14 PROCEDURE — 3075F SYST BP GE 130 - 139MM HG: CPT | Performed by: STUDENT IN AN ORGANIZED HEALTH CARE EDUCATION/TRAINING PROGRAM

## 2023-07-14 PROCEDURE — 99214 OFFICE O/P EST MOD 30 MIN: CPT | Performed by: STUDENT IN AN ORGANIZED HEALTH CARE EDUCATION/TRAINING PROGRAM

## 2023-07-14 RX ORDER — SEMAGLUTIDE 1.34 MG/ML
INJECTION, SOLUTION SUBCUTANEOUS
Qty: 4 ADJUSTABLE DOSE PRE-FILLED PEN SYRINGE | Refills: 0 | Status: SHIPPED | OUTPATIENT
Start: 2023-07-14 | End: 2023-09-12

## 2023-07-20 ENCOUNTER — TELEPHONE (OUTPATIENT)
Dept: FAMILY MEDICINE CLINIC | Age: 63
End: 2023-07-20

## 2023-07-21 ENCOUNTER — OFFICE VISIT (OUTPATIENT)
Dept: UROLOGY | Age: 63
End: 2023-07-21
Payer: COMMERCIAL

## 2023-07-21 VITALS — HEIGHT: 72 IN | RESPIRATION RATE: 18 BRPM | WEIGHT: 315 LBS | BODY MASS INDEX: 42.66 KG/M2

## 2023-07-21 DIAGNOSIS — R31.9 HEMATURIA, UNSPECIFIED TYPE: ICD-10-CM

## 2023-07-21 DIAGNOSIS — N52.9 ERECTILE DYSFUNCTION, UNSPECIFIED ERECTILE DYSFUNCTION TYPE: ICD-10-CM

## 2023-07-21 DIAGNOSIS — N40.0 BENIGN PROSTATIC HYPERPLASIA, UNSPECIFIED WHETHER LOWER URINARY TRACT SYMPTOMS PRESENT: ICD-10-CM

## 2023-07-21 DIAGNOSIS — R97.20 ELEVATED PSA: Primary | ICD-10-CM

## 2023-07-21 PROCEDURE — 99214 OFFICE O/P EST MOD 30 MIN: CPT | Performed by: UROLOGY

## 2023-07-21 NOTE — PROGRESS NOTES
lumbar--Dr. Tatiana Velazquez. CHOLECYSTECTOMY      COLONOSCOPY      TURP N/A 5/17/2022    CYSTO, GREENLIGHT PHOTOVAPORIZATION OF THE PROSTATE performed by Liseth Morgan MD at [address] History   Problem Relation Age of Onset    COPD Mother     Diabetes Father     Heart Attack Father     Other Father         Peritonitis     Outpatient Medications Marked as Taking for the 7/21/23 encounter (Office Visit) with Liseth Morgan MD   Medication Sig Dispense Refill    Semaglutide,0.25 or 0.5MG/DOS, (OZEMPIC, 0.25 OR 0.5 MG/DOSE,) 2 MG/1.5ML SOPN Inject 0.25 mg into the skin once a week for 30 days, THEN 0.5 mg once a week. 4 Adjustable Dose Pre-filled Pen Syringe 0    nitrofurantoin, macrocrystal-monohydrate, (MACROBID) 100 MG capsule Take 1 capsule by mouth 2 times daily for 10 days 20 capsule 0    sertraline (ZOLOFT) 50 MG tablet TAKE 1 TABLET DAILY 90 tablet 3    metFORMIN (GLUCOPHAGE-XR) 500 MG extended release tablet Take 1 tablet by mouth in the morning and at bedtime 30 tablet 1    hydroCHLOROthiazide (MICROZIDE) 12.5 MG capsule TAKE 1 CAPSULE DAILY 90 capsule 3    losartan (COZAAR) 100 MG tablet TAKE 1 TABLET DAILY 90 tablet 3    rosuvastatin (CRESTOR) 5 MG tablet Take 1 tablet by mouth nightly 30 tablet 1    sildenafil (VIAGRA) 100 MG tablet Take 1/2 to 1 tab as needed, use as instructed. 10 tablet 5    MISC NATURAL PRODUCT OP Apply to eye Eye victims      Probiotic Product (CULTURELLE PROBIOTICS PO) Take by mouth      vitamin C (ASCORBIC ACID) 500 MG tablet Take 1 tablet by mouth daily      ONETOUCH VERIO strip USE TO TEST SUGAR TWICE A  strip 3    blood glucose monitor kit and supplies One Touch Verio Glucometer. Sig: test sugar daily. Dx: E11.9 1 kit 0    docusate sodium (COLACE) 100 MG capsule Take 1 capsule by mouth as needed      Lancets MISC Lancet for Contour Next Lancing Device or per patient preference. Sig: test sugar BID.  Dx: E11.9 200 each 3    CPAP Machine MISC by Does not apply route Please change his

## 2023-07-28 NOTE — PROGRESS NOTES
[facility]      Basim Fitch         [phone]  7/31/2023   Chief Complaint   Patient presents with    Follow-up     1yr GEM f/u w/[facility] download. Notes he has been renting a machine because his motor quit working. Was set up with his machine 5/8/2017. Needs a script for PAP supplies. Pt of Dr. Heidi Monsalve     PAP Download:   Original or initial AHI: 20     Date of initial study: 03/01/2000      Compliant  97%     Noncompliant 3 %     PAP Type APAP     Level  10/14 cmH2O   Avg Hrs/Day 6hrs 24mins  AHI: 2.5   Recorded compliance dates , 6/25/23  to 7/24/23     Machine/Mfg:   [x] ResMed    [] Respironics/Dreamstation   Interface:   [] Nasal    [] Nasal pillows   [x] FFM      Provider:      [x] [facility]     [][employer]     [] [employer]    [] Bret Buitrago    [] [facility]               [] [facility]      [] [employer]    [] [address] [facility] [address]:      [] Other    Neck Size: 19  Mallampati 2  ESS:  3  SAQLI: 87    Here is a scan of the most recent download:            Presentation:   Shirin Ayers presents for sleep medicine follow up for obstructive sleep apnea  Since the last visit, Shirin Ayers is doing well with PAP. His PAP is loud and he is using a rental currently. He sleeps ok and feels rested. Equipment issues: The pressure is  acceptable, the mask is acceptable     Sleep issues:  Do you feel better? Yes  More rested? Yes   Better concentration? yes    Progress History:   Since last visit any new medical issues? Yes DM  New ER or hospital visits? No  Any new or changes in medicines? No  Any new sleep medicines? No    Review of Systems -   Review of Systems   Constitutional:  Negative for activity change, appetite change, chills and fever. HENT:  Negative for congestion and postnasal drip. Eyes: Negative. Respiratory:  Negative for cough, chest tightness, shortness of breath, wheezing and stridor. Cardiovascular:  Negative for chest pain and leg swelling.    Gastrointestinal:

## 2023-07-31 ENCOUNTER — OFFICE VISIT (OUTPATIENT)
Dept: PULMONOLOGY | Age: 63
End: 2023-07-31
Payer: COMMERCIAL

## 2023-07-31 VITALS
HEIGHT: 72 IN | HEART RATE: 58 BPM | DIASTOLIC BLOOD PRESSURE: 72 MMHG | OXYGEN SATURATION: 99 % | BODY MASS INDEX: 42.66 KG/M2 | WEIGHT: 315 LBS | SYSTOLIC BLOOD PRESSURE: 120 MMHG | TEMPERATURE: 97.8 F

## 2023-07-31 DIAGNOSIS — G47.33 OBSTRUCTIVE SLEEP APNEA ON CPAP: Primary | ICD-10-CM

## 2023-07-31 DIAGNOSIS — Z99.89 OBSTRUCTIVE SLEEP APNEA ON CPAP: Primary | ICD-10-CM

## 2023-07-31 DIAGNOSIS — E66.01 MORBID OBESITY WITH BMI OF 40.0-44.9, ADULT (HCC): ICD-10-CM

## 2023-07-31 PROCEDURE — 3074F SYST BP LT 130 MM HG: CPT | Performed by: PHYSICIAN ASSISTANT

## 2023-07-31 PROCEDURE — 3078F DIAST BP <80 MM HG: CPT | Performed by: PHYSICIAN ASSISTANT

## 2023-07-31 PROCEDURE — 99213 OFFICE O/P EST LOW 20 MIN: CPT | Performed by: PHYSICIAN ASSISTANT

## 2023-07-31 ASSESSMENT — ENCOUNTER SYMPTOMS
STRIDOR: 0
WHEEZING: 0
NAUSEA: 0
DIARRHEA: 0
ALLERGIC/IMMUNOLOGIC NEGATIVE: 1
CHEST TIGHTNESS: 0
EYES NEGATIVE: 1
COUGH: 0
BACK PAIN: 0
SHORTNESS OF BREATH: 0

## 2023-08-03 DIAGNOSIS — E11.9 TYPE 2 DIABETES MELLITUS WITHOUT COMPLICATION, UNSPECIFIED WHETHER LONG TERM INSULIN USE (HCC): ICD-10-CM

## 2023-08-03 DIAGNOSIS — E78.5 DYSLIPIDEMIA, GOAL LDL BELOW 70: ICD-10-CM

## 2023-08-04 RX ORDER — METFORMIN HYDROCHLORIDE 500 MG/1
500 TABLET, EXTENDED RELEASE ORAL 2 TIMES DAILY
Qty: 180 TABLET | Refills: 1 | Status: SHIPPED | OUTPATIENT
Start: 2023-08-04

## 2023-08-04 RX ORDER — ROSUVASTATIN CALCIUM 5 MG/1
5 TABLET, COATED ORAL NIGHTLY
Qty: 90 TABLET | Refills: 3 | Status: SHIPPED | OUTPATIENT
Start: 2023-08-04

## 2023-08-04 NOTE — TELEPHONE ENCOUNTER
Script sent to pharmacy.   Thank you,  Electronically signed by Dank Solis MD on 8/4/2023 at 8:13 AM

## 2023-09-15 ENCOUNTER — HOSPITAL ENCOUNTER (OUTPATIENT)
Age: 63
Discharge: HOME | End: 2023-09-15
Payer: COMMERCIAL

## 2023-09-15 DIAGNOSIS — E11.9 TYPE 2 DIABETES MELLITUS WITHOUT COMPLICATION, WITHOUT LONG-TERM CURRENT USE OF INSULIN (HCC): ICD-10-CM

## 2023-09-15 LAB
DEPRECATED MEAN GLUCOSE BLD GHB EST-ACNC: 138 MG/DL (ref 70–126)
HBA1C MFR BLD HPLC: 6.6 % (ref 4.4–6.4)

## 2023-09-15 PROCEDURE — 36415 COLL VENOUS BLD VENIPUNCTURE: CPT

## 2023-09-15 PROCEDURE — 83036 HEMOGLOBIN GLYCOSYLATED A1C: CPT

## 2023-09-18 ENCOUNTER — TELEPHONE (OUTPATIENT)
Dept: FAMILY MEDICINE CLINIC | Age: 63
End: 2023-09-18

## 2023-09-18 NOTE — TELEPHONE ENCOUNTER
----- Message from Rosa Dubois MD sent at 9/18/2023  8:58 AM EDT -----  A1C improved from 3 months ago, is at 6.6 - down from 7.4. Good work and keep up the good work. Healthy diet and regular exercise along with taking medications (metformin) as prescribed will continue to keep you healthy. Can discuss more at [facility] appointment.   Thank you,  Electronically signed by Alvaro Krause MD on 9/18/2023 at 8:58 AM

## 2023-09-18 NOTE — TELEPHONE ENCOUNTER
Written by Usha Juarez MD on 9/18/2023  8:58 AM EDT View Full Comments  Seen by patient Yao Baker on 9/18/2023  9:00 AM

## 2023-09-20 ENCOUNTER — OFFICE VISIT (OUTPATIENT)
Dept: FAMILY MEDICINE CLINIC | Age: 63
End: 2023-09-20

## 2023-09-20 VITALS
HEART RATE: 65 BPM | BODY MASS INDEX: 42.66 KG/M2 | RESPIRATION RATE: 16 BRPM | HEIGHT: 72 IN | OXYGEN SATURATION: 98 % | DIASTOLIC BLOOD PRESSURE: 74 MMHG | WEIGHT: 315 LBS | SYSTOLIC BLOOD PRESSURE: 128 MMHG | TEMPERATURE: 98.2 F

## 2023-09-20 DIAGNOSIS — Z91.89 RISK FOR CORONARY ARTERY DISEASE GREATER THAN 20% IN NEXT 10 YEARS: ICD-10-CM

## 2023-09-20 DIAGNOSIS — E11.9 TYPE 2 DIABETES MELLITUS WITHOUT COMPLICATION, WITHOUT LONG-TERM CURRENT USE OF INSULIN (HCC): Primary | ICD-10-CM

## 2023-09-20 DIAGNOSIS — E78.5 DYSLIPIDEMIA, GOAL LDL BELOW 70: ICD-10-CM

## 2023-09-20 DIAGNOSIS — I10 ESSENTIAL HYPERTENSION: ICD-10-CM

## 2023-09-20 NOTE — PROGRESS NOTES
[address] [facility] [address] [place] [address]  Dept: [phone]  Loc: [address] Joby Ruth (:  1960) is a 58 y.o. male,Established patient, here for evaluation of the following chief complaint(s):  Follow-up (2 month follow up)      ASSESSMENT/PLAN:  1. Type 2 diabetes mellitus without complication, without long-term current use of insulin (HCC)  -     Hemoglobin A1C; Future  -      DIABETES FOOT EXAM  2. Essential hypertension  3. Dyslipidemia, goal LDL below 70  4. Risk for coronary artery disease greater than 20% in next 10 years    Diabetes. A1c at 6.6, at goal with metformin, continue at current dose. Insurance did not cover ozempic. [facility] last [facility]. Reapeat A1c in 3 months, if stable may go to 6 month follow up. BP stable: 128/74 in office, continue losartan 100 mg and hctz: 12.5 mg daily. Dyslipidemia: continue crestor 5, discussed increasing to 10 in setting of dx and dm. Patient will consider. The 10-year ASCVD risk score (Shayne DAVIDSON, et al., 2019) is: 23.4%    Values used to calculate the score:      Age: 58 years      Sex: Male      Is Non- : No      Diabetic: Yes      Tobacco smoker: No      Systolic Blood Pressure: 321 mmHg      Is BP treated: Yes      HDL Cholesterol: 52 mg/dL      Total Cholesterol: 190 mg/dL    Recommend completing shingles vaccine. Return in about 3 months (around 2023) for DM. SUBJECTIVE/OBJECTIVE:  HPI  Presents for diabetes follow up. Ozempic not approved, been on metformin only and would like to continue current plan. Prostate healing, follows with urology. Sx improving. Hematureia resolved for a month. /67 or lower at home. Review of Systems  Constitutional: Negative for chills, diaphoresis and fever. HENT: Negative for congestion and sore throat.     Eyes: Negative for redness and visual

## 2023-11-14 NOTE — PROGRESS NOTES
reviewed and discussed with patient  - He  was advised to continue current positive airway pressure therapy with above described pressure. - He  advised to keep good compliance with current recommended pressure to get optimal results and clinical improvement  - Recommend 7-9 hours of sleep with PAP  - He was advised to call [employer] regarding supplies if needed.   -He call my office for earlier appointment if needed for worsening of sleep symptoms.   - He was instructed on weight loss  - Octavia Tanner was educated about my impression and plan. Patient verbalizesunderstanding.   We will see Ryan Shresthaable back in: 1 year with download    Information added by my medical assistant/LPN was reviewed today       Simi Moody, [address] [facility]  11/20/2023

## 2023-11-20 ENCOUNTER — TELEPHONE (OUTPATIENT)
Dept: UROLOGY | Age: 63
End: 2023-11-20

## 2023-11-20 ENCOUNTER — OFFICE VISIT (OUTPATIENT)
Dept: PULMONOLOGY | Age: 63
End: 2023-11-20
Payer: COMMERCIAL

## 2023-11-20 ENCOUNTER — NURSE ONLY (OUTPATIENT)
Dept: LAB | Age: 63
End: 2023-11-20

## 2023-11-20 VITALS
WEIGHT: 312 LBS | BODY MASS INDEX: 42.26 KG/M2 | TEMPERATURE: 97.4 F | OXYGEN SATURATION: 98 % | SYSTOLIC BLOOD PRESSURE: 124 MMHG | DIASTOLIC BLOOD PRESSURE: 86 MMHG | HEIGHT: 72 IN | HEART RATE: 64 BPM

## 2023-11-20 DIAGNOSIS — E66.01 MORBID OBESITY WITH BMI OF 40.0-44.9, ADULT (HCC): ICD-10-CM

## 2023-11-20 DIAGNOSIS — R97.20 ELEVATED PSA: ICD-10-CM

## 2023-11-20 DIAGNOSIS — G47.33 OBSTRUCTIVE SLEEP APNEA ON CPAP: Primary | ICD-10-CM

## 2023-11-20 LAB — PSA SERPL-MCNC: 5.73 NG/ML (ref 0–1)

## 2023-11-20 PROCEDURE — 99213 OFFICE O/P EST LOW 20 MIN: CPT | Performed by: PHYSICIAN ASSISTANT

## 2023-11-20 PROCEDURE — 3074F SYST BP LT 130 MM HG: CPT | Performed by: PHYSICIAN ASSISTANT

## 2023-11-20 PROCEDURE — 3079F DIAST BP 80-89 MM HG: CPT | Performed by: PHYSICIAN ASSISTANT

## 2023-11-20 ASSESSMENT — ENCOUNTER SYMPTOMS
DIARRHEA: 0
NAUSEA: 0
WHEEZING: 0
STRIDOR: 0
EYES NEGATIVE: 1
SHORTNESS OF BREATH: 0
BACK PAIN: 0
CHEST TIGHTNESS: 0
COUGH: 0
ALLERGIC/IMMUNOLOGIC NEGATIVE: 1

## 2023-11-20 NOTE — TELEPHONE ENCOUNTER
Needs f/u appointment. Has not been seen since 7/2023.  PSA remains elevated    The patient should go to the ED should they develop fever, chills, nausea, vomiting, chest pain, SOB, calf pain, feelings of incomplete emptying, or should they otherwise feel they need evaluated

## 2023-11-30 ENCOUNTER — OFFICE VISIT (OUTPATIENT)
Dept: UROLOGY | Age: 63
End: 2023-11-30
Payer: COMMERCIAL

## 2023-11-30 VITALS — HEIGHT: 72 IN | RESPIRATION RATE: 16 BRPM | BODY MASS INDEX: 42.66 KG/M2 | WEIGHT: 315 LBS

## 2023-11-30 DIAGNOSIS — R97.20 ELEVATED PSA: Primary | ICD-10-CM

## 2023-11-30 DIAGNOSIS — N52.9 ERECTILE DYSFUNCTION, UNSPECIFIED ERECTILE DYSFUNCTION TYPE: ICD-10-CM

## 2023-11-30 LAB
BILIRUBIN URINE: NEGATIVE
BLOOD URINE, POC: NEGATIVE
CHARACTER, URINE: CLEAR
COLOR, URINE: YELLOW
GLUCOSE URINE: NEGATIVE MG/DL
KETONES, URINE: NEGATIVE
LEUKOCYTE CLUMPS, URINE: NEGATIVE
NITRITE, URINE: NEGATIVE
PH, URINE: 5.5 (ref 5–9)
PROTEIN, URINE: NEGATIVE MG/DL
SPECIFIC GRAVITY, URINE: 1.02 (ref 1–1.03)
UROBILINOGEN, URINE: 0.2 EU/DL (ref 0–1)

## 2023-11-30 PROCEDURE — 99214 OFFICE O/P EST MOD 30 MIN: CPT

## 2023-11-30 PROCEDURE — 81003 URINALYSIS AUTO W/O SCOPE: CPT

## 2023-11-30 RX ORDER — SILDENAFIL 100 MG/1
TABLET, FILM COATED ORAL
Qty: 10 TABLET | Refills: 5 | Status: SHIPPED | OUTPATIENT
Start: 2023-11-30

## 2023-11-30 NOTE — PROGRESS NOTES
biopsy in 5/2023, which was negative for malignancy. ED: Sildenafil prn, refilled.  Confirmed that patient is not taking nitroglycerin     PSA in 6 months         Terra Bruner  Urology

## 2023-11-30 NOTE — PATIENT INSTRUCTIONS
Continue Sildenafil  PSA in 6 months  Call for blood in the urine  Call with questions, comments, or concerns. I recommend going to the ED for further evaluation if you develop fever, chills, nausea, vomiting, chest pain, SOB, or calf pain.

## 2023-12-18 ENCOUNTER — HOSPITAL ENCOUNTER (OUTPATIENT)
Age: 63
Discharge: HOME | End: 2023-12-18
Payer: COMMERCIAL

## 2023-12-18 DIAGNOSIS — E11.9 TYPE 2 DIABETES MELLITUS WITHOUT COMPLICATION, WITHOUT LONG-TERM CURRENT USE OF INSULIN (HCC): ICD-10-CM

## 2023-12-18 LAB
DEPRECATED MEAN GLUCOSE BLD GHB EST-ACNC: 129 MG/DL (ref 70–126)
HBA1C MFR BLD HPLC: 6.3 % (ref 4.4–6.4)

## 2023-12-18 PROCEDURE — 83036 HEMOGLOBIN GLYCOSYLATED A1C: CPT

## 2023-12-18 PROCEDURE — 36415 COLL VENOUS BLD VENIPUNCTURE: CPT

## 2024-01-02 ENCOUNTER — OFFICE VISIT (OUTPATIENT)
Dept: FAMILY MEDICINE CLINIC | Age: 64
End: 2024-01-02
Payer: COMMERCIAL

## 2024-01-02 VITALS
SYSTOLIC BLOOD PRESSURE: 124 MMHG | BODY MASS INDEX: 42.66 KG/M2 | RESPIRATION RATE: 20 BRPM | HEIGHT: 72 IN | HEART RATE: 62 BPM | TEMPERATURE: 97.8 F | WEIGHT: 315 LBS | OXYGEN SATURATION: 98 % | DIASTOLIC BLOOD PRESSURE: 78 MMHG

## 2024-01-02 DIAGNOSIS — E11.9 TYPE 2 DIABETES MELLITUS WITHOUT COMPLICATION, WITHOUT LONG-TERM CURRENT USE OF INSULIN (HCC): ICD-10-CM

## 2024-01-02 DIAGNOSIS — E78.5 DYSLIPIDEMIA, GOAL LDL BELOW 70: ICD-10-CM

## 2024-01-02 DIAGNOSIS — G47.33 OBSTRUCTIVE SLEEP APNEA ON CPAP: ICD-10-CM

## 2024-01-02 DIAGNOSIS — I10 ESSENTIAL HYPERTENSION: Primary | ICD-10-CM

## 2024-01-02 PROCEDURE — 3074F SYST BP LT 130 MM HG: CPT | Performed by: STUDENT IN AN ORGANIZED HEALTH CARE EDUCATION/TRAINING PROGRAM

## 2024-01-02 PROCEDURE — 99214 OFFICE O/P EST MOD 30 MIN: CPT | Performed by: STUDENT IN AN ORGANIZED HEALTH CARE EDUCATION/TRAINING PROGRAM

## 2024-01-02 PROCEDURE — 3078F DIAST BP <80 MM HG: CPT | Performed by: STUDENT IN AN ORGANIZED HEALTH CARE EDUCATION/TRAINING PROGRAM

## 2024-01-02 ASSESSMENT — PATIENT HEALTH QUESTIONNAIRE - PHQ9
1. LITTLE INTEREST OR PLEASURE IN DOING THINGS: 0
5. POOR APPETITE OR OVEREATING: 1
6. FEELING BAD ABOUT YOURSELF - OR THAT YOU ARE A FAILURE OR HAVE LET YOURSELF OR YOUR FAMILY DOWN: 0
SUM OF ALL RESPONSES TO PHQ QUESTIONS 1-9: 1
7. TROUBLE CONCENTRATING ON THINGS, SUCH AS READING THE NEWSPAPER OR WATCHING TELEVISION: 0
SUM OF ALL RESPONSES TO PHQ QUESTIONS 1-9: 1
3. TROUBLE FALLING OR STAYING ASLEEP: 0
SUM OF ALL RESPONSES TO PHQ9 QUESTIONS 1 & 2: 0
SUM OF ALL RESPONSES TO PHQ QUESTIONS 1-9: 1
9. THOUGHTS THAT YOU WOULD BE BETTER OFF DEAD, OR OF HURTING YOURSELF: 0
4. FEELING TIRED OR HAVING LITTLE ENERGY: 0
8. MOVING OR SPEAKING SO SLOWLY THAT OTHER PEOPLE COULD HAVE NOTICED. OR THE OPPOSITE, BEING SO FIGETY OR RESTLESS THAT YOU HAVE BEEN MOVING AROUND A LOT MORE THAN USUAL: 0
2. FEELING DOWN, DEPRESSED OR HOPELESS: 0
SUM OF ALL RESPONSES TO PHQ QUESTIONS 1-9: 1

## 2024-01-02 NOTE — PROGRESS NOTES
Attending Physician Note    I saw and evaluated the patient, performing the key elements of the service.  I discussed the findings, assessment and plan with the resident and agree with the resident's findings and plan as documented in the resident's note.  GC modifier added.    Brief summary:  DM type 2 - A1C at goal.  He plans to make further lifestyle improvements with goal of getting off of metformin.    HTN - controlled.  HLD - cont rosuvastatin at current dose - pt does not want to increase.  .

## 2024-01-02 NOTE — PROGRESS NOTES
[facility]  [address]  [facility] [address]  Dept: [phone]  Loc: [phone]      Finn Live (:  1960) is a 63 y.o. male,Established patient, here for evaluation of the following chief complaint(s):  Follow-up (3 mo DM f/u )      ASSESSMENT/PLAN:  1. Essential hypertension  -     CBC; Future  -     Comprehensive Metabolic Panel; Future  2. Type 2 diabetes mellitus without complication, without long-term current use of insulin (HCC)  -     Comprehensive Metabolic Panel; Future  -     Hemoglobin A1C; Future  3. Dyslipidemia, goal LDL below 70  -     Lipid Panel; Future  4. Obstructive sleep apnea on CPAP    Diabetes. A1c at 6.3 on 23, at goal with metformin, continue at current dose. Tried previously, insurance did not cover ozempic. Would like to loose weight and come off metformin all together.   [facility] exam at [facility] 4 months ago  Stable, 6 month follow up with labs     BP stable: 128/74 in office, continue losartan 100 mg and hctz: 12.5 mg daily.  Dyslipidemia: continue crestor 5, discussed increasing to 10 mg daily-patient declined.     The 10-year ASCVD risk score (Shayne DK, et al., 2019) is: 22.3%    Values used to calculate the score:      Age: 63 years      Sex: Male      Is Non- : No      Diabetic: Yes      Tobacco smoker: No      Systolic Blood Pressure: 124 mmHg      Is BP treated: Yes      HDL Cholesterol: 52 mg/dL      Total Cholesterol: 190 mg/dL    GEM on CPAP  Follow up with sleep medicine in a couple months.    Recommend completing shingles vaccine.  Recommend RSV, pneumococcal and Tdap vaccines  Recommend influenza and COVID vaccines.    Return in about 6 months (around 2024) for DM.    SUBJECTIVE/OBJECTIVE:  HPI  Presents for follow up of diabetes and hypertension.  Patient reports no concerns or complaints at this time.  Patient states average sugars in

## 2024-01-22 DIAGNOSIS — E11.9 TYPE 2 DIABETES MELLITUS WITHOUT COMPLICATION, UNSPECIFIED WHETHER LONG TERM INSULIN USE (HCC): ICD-10-CM

## 2024-01-22 NOTE — TELEPHONE ENCOUNTER
This medication refill is regarding a electronic request. Refill requested by  [facility] .    Requested Prescriptions     Pending Prescriptions Disp Refills    metFORMIN (GLUCOPHAGE-XR) 500 MG extended release tablet [Pharmacy Med Name: METFORMIN HCL ER TABS 500MG] 180 tablet 3     Sig: TAKE 1 TABLET IN THE MORNING AND AT BEDTIME       Date of last visit: 1/2/2024   Date of next visit: 6/3/2024  Date of last refill: 8/4/2023  180/1    Last Lipid Panel:    Lab Results   Component Value Date/Time    CHOL 190 06/12/2023 11:36 AM    TRIG 113 06/12/2023 11:36 AM    HDL 52 06/12/2023 11:36 AM    HDL 48 09/21/2010 12:00 AM    LDLCALC 115 06/12/2023 11:36 AM     Last CMP:   Lab Results   Component Value Date     06/12/2023    K 4.4 06/12/2023    CL 97 (L) 06/12/2023    CO2 27 06/12/2023    BUN 12 06/12/2023    CREATININE 0.6 06/12/2023    GLUCOSE 162 (H) 06/12/2023    CALCIUM 10.0 06/12/2023    PROT 7.8 06/12/2023    LABALBU 5.0 06/12/2023    BILITOT 0.7 06/12/2023    ALKPHOS 106 06/12/2023    AST 20 06/12/2023    ALT 22 06/12/2023    LABGLOM >60 06/12/2023       Last Thyroid:    Lab Results   Component Value Date    TSH 1.840 06/12/2023    T4FREE 1.03 06/12/2023     Last Hemoglobin A1C:    Lab Results   Component Value Date/Time    LABA1C 6.3 12/18/2023 09:17 AM    AVGG 129 12/18/2023 09:17 AM       Rx verified, ordered and set to EP.

## 2024-01-23 RX ORDER — METFORMIN HYDROCHLORIDE 500 MG/1
TABLET, EXTENDED RELEASE ORAL
Qty: 180 TABLET | Refills: 3 | Status: SHIPPED | OUTPATIENT
Start: 2024-01-23

## 2024-03-08 NOTE — PROGRESS NOTES
Plan   Do you need any equipment today? Yes update supplies  - Discussed that he may need a new PSG for Medicare  - Download reviewed and discussed with patient  - He  was advised to continue current positive airway pressure therapy with above described pressure.   - He  advised to keep good compliance with current recommended pressure to get optimal results and clinical improvement  - Recommend 7-9 hours of sleep with PAP  - He was advised to call [employer] regarding supplies if needed.   -He call my office for earlier appointment if needed for worsening of sleep symptoms.   - He was instructed on weight loss  - Finn was educated about my impression and plan. Patient verbalizesunderstanding.  We will see Finn Live back in: 1 year with download    Information added by my medical assistant/LPN was reviewed today       Elo Joshi PA-C, [facility]  Center for pulmonary and Sleep Medicine  3/11/2024     (Please note that portions of this note may have been with a voice recognition program.  Efforts were made to edit the dictation but occasionally words are mis-transcribed )

## 2024-03-11 ENCOUNTER — OFFICE VISIT (OUTPATIENT)
Dept: PULMONOLOGY | Age: 64
End: 2024-03-11
Payer: MEDICARE

## 2024-03-11 VITALS
TEMPERATURE: 97.8 F | DIASTOLIC BLOOD PRESSURE: 64 MMHG | BODY MASS INDEX: 42.66 KG/M2 | WEIGHT: 315 LBS | HEART RATE: 62 BPM | SYSTOLIC BLOOD PRESSURE: 138 MMHG | HEIGHT: 72 IN | OXYGEN SATURATION: 99 %

## 2024-03-11 DIAGNOSIS — E66.01 MORBID OBESITY WITH BMI OF 45.0-49.9, ADULT (HCC): ICD-10-CM

## 2024-03-11 DIAGNOSIS — G47.33 OBSTRUCTIVE SLEEP APNEA ON CPAP: Primary | ICD-10-CM

## 2024-03-11 PROCEDURE — 1036F TOBACCO NON-USER: CPT | Performed by: PHYSICIAN ASSISTANT

## 2024-03-11 PROCEDURE — 3075F SYST BP GE 130 - 139MM HG: CPT | Performed by: PHYSICIAN ASSISTANT

## 2024-03-11 PROCEDURE — G8484 FLU IMMUNIZE NO ADMIN: HCPCS | Performed by: PHYSICIAN ASSISTANT

## 2024-03-11 PROCEDURE — G8417 CALC BMI ABV UP PARAM F/U: HCPCS | Performed by: PHYSICIAN ASSISTANT

## 2024-03-11 PROCEDURE — 99213 OFFICE O/P EST LOW 20 MIN: CPT | Performed by: PHYSICIAN ASSISTANT

## 2024-03-11 PROCEDURE — 3078F DIAST BP <80 MM HG: CPT | Performed by: PHYSICIAN ASSISTANT

## 2024-03-11 PROCEDURE — 1123F ACP DISCUSS/DSCN MKR DOCD: CPT | Performed by: PHYSICIAN ASSISTANT

## 2024-03-11 PROCEDURE — 3017F COLORECTAL CA SCREEN DOC REV: CPT | Performed by: PHYSICIAN ASSISTANT

## 2024-03-11 PROCEDURE — G8427 DOCREV CUR MEDS BY ELIG CLIN: HCPCS | Performed by: PHYSICIAN ASSISTANT

## 2024-03-11 ASSESSMENT — ENCOUNTER SYMPTOMS
STRIDOR: 0
ALLERGIC/IMMUNOLOGIC NEGATIVE: 1
CHEST TIGHTNESS: 0
SHORTNESS OF BREATH: 0
DIARRHEA: 0
BACK PAIN: 0
EYES NEGATIVE: 1
WHEEZING: 0
NAUSEA: 0
COUGH: 0

## 2024-05-06 ENCOUNTER — HOSPITAL ENCOUNTER (OUTPATIENT)
Age: 64
Discharge: HOME | End: 2024-05-06
Payer: MEDICARE

## 2024-05-06 DIAGNOSIS — R97.20 ELEVATED PSA: ICD-10-CM

## 2024-05-06 LAB — PSA SERPL-MCNC: 6.37 NG/ML (ref 0–1)

## 2024-05-06 PROCEDURE — 36415 COLL VENOUS BLD VENIPUNCTURE: CPT

## 2024-05-06 PROCEDURE — 84153 ASSAY OF PSA TOTAL: CPT

## 2024-05-09 ENCOUNTER — OFFICE VISIT (OUTPATIENT)
Dept: UROLOGY | Age: 64
End: 2024-05-09
Payer: MEDICARE

## 2024-05-09 VITALS — RESPIRATION RATE: 16 BRPM | BODY MASS INDEX: 42.66 KG/M2 | WEIGHT: 315 LBS | HEIGHT: 72 IN

## 2024-05-09 DIAGNOSIS — R97.20 ELEVATED PSA: Primary | ICD-10-CM

## 2024-05-09 DIAGNOSIS — R31.9 HEMATURIA, UNSPECIFIED TYPE: ICD-10-CM

## 2024-05-09 LAB
BACTERIA: ABNORMAL
BILIRUB UR QL STRIP: NEGATIVE
BILIRUBIN, POC: NEGATIVE
BLOOD URINE, POC: NORMAL
CASTS #/AREA URNS LPF: ABNORMAL /LPF
CASTS #/AREA URNS LPF: ABNORMAL /LPF
CHARACTER UR: CLEAR
CHARCOAL URNS QL MICRO: ABNORMAL
CLARITY, POC: CLEAR
COLOR UR: YELLOW
COLOR, POC: YELLOW
CRYSTALS URNS QL MICRO: ABNORMAL
EPITHELIAL CELLS, UA: ABNORMAL /HPF
GLUCOSE UR QL STRIP.AUTO: NEGATIVE MG/DL
GLUCOSE URINE, POC: NEGATIVE
HGB UR QL STRIP.AUTO: ABNORMAL
KETONES UR QL STRIP.AUTO: NEGATIVE
KETONES, POC: NEGATIVE
LEUKOCYTE EST, POC: NEGATIVE
LEUKOCYTE ESTERASE UR QL STRIP.AUTO: NEGATIVE
NITRITE UR QL STRIP.AUTO: NEGATIVE
NITRITE, POC: NEGATIVE
PH UR STRIP.AUTO: 7 [PH] (ref 5–9)
PH, POC: 7
PROT UR STRIP.AUTO-MCNC: ABNORMAL MG/DL
PROTEIN, POC: NEGATIVE
RBC #/AREA URNS HPF: ABNORMAL /HPF
RENAL EPI CELLS #/AREA URNS HPF: ABNORMAL /[HPF]
SPECIFIC GRAVITY UA: 1.02 (ref 1–1.03)
SPECIFIC GRAVITY, POC: 1.01
UROBILINOGEN, POC: 0.2
UROBILINOGEN, URINE: 0.2 EU/DL (ref 0–1)
WBC #/AREA URNS HPF: ABNORMAL /HPF
YEAST LIKE FUNGI URNS QL MICRO: ABNORMAL

## 2024-05-09 PROCEDURE — 1123F ACP DISCUSS/DSCN MKR DOCD: CPT

## 2024-05-09 PROCEDURE — G8417 CALC BMI ABV UP PARAM F/U: HCPCS

## 2024-05-09 PROCEDURE — 99214 OFFICE O/P EST MOD 30 MIN: CPT

## 2024-05-09 PROCEDURE — 3017F COLORECTAL CA SCREEN DOC REV: CPT

## 2024-05-09 PROCEDURE — 81002 URINALYSIS NONAUTO W/O SCOPE: CPT

## 2024-05-09 PROCEDURE — G8427 DOCREV CUR MEDS BY ELIG CLIN: HCPCS

## 2024-05-09 PROCEDURE — 1036F TOBACCO NON-USER: CPT

## 2024-05-09 NOTE — PATIENT INSTRUCTIONS
PSA in 6 months   I will send your urine for additional studies and call with abnormal results.   Call should you notice any blood in the urine   Call with questions, comments, or concerns. I recommend going to the ED for further evaluation if you develop fever, chills, nausea, vomiting, chest pain, SOB, or calf pain.

## 2024-05-09 NOTE — PROGRESS NOTES
[facility] [place] [facility]  [facility] UROLOGY  [address] [facility] [address]  Dept: [phone]  Loc: [phone]    Visit Date: 5/9/2024        HPI:     HPI  Mr. Live is a 63-year-old male that presents in follow-up.     Hx of BPH s/p PVP with Dr. Hill in 5/2022. Doing well at this time.      Hx of gross hematuria. CT A/P in 8/2022 with hyperdense blood within the bladder which is minimally distended. There is also small amount of air within the bladder. Cystoscopy in 8/2022 with no bladder tumor. Prostate with some calcifications on anterior tissue and lateral lobes, previous TURP defect. Reports once instance of gross hematuria x 1 day 3 weeks ago. Denies hx of smoking.      Sildenafil prn for ED.     Patient also with elevated PSA. PSA has been rising and elevated for several years. MRI in 2019 with PI-RADS 2. Prostate MRI in 3/2023 with 2 PI-RAD 3 lesions. Underwent biopsy in 5/2023. Pathology with patchy acute and chronic inflammation.  Basal cell hyperplasia is present in some areas.  There is no evidence of high-grade prostatic intraepithelial neoplasia or malignancy. Also had biopsy in 2019, which was unremarkable for malignancy. Biopsy did report atypical small acinar proliferation.     Current Outpatient Medications   Medication Sig Dispense Refill    metFORMIN (GLUCOPHAGE-XR) 500 MG extended release tablet TAKE 1 TABLET IN THE MORNING AND AT BEDTIME 180 tablet 3    sildenafil (VIAGRA) 100 MG tablet Take 1/2 to 1 tab as needed, use as instructed. 10 tablet 5    rosuvastatin (CRESTOR) 5 MG tablet Take 1 tablet by mouth nightly 90 tablet 3    sertraline (ZOLOFT) 50 MG tablet TAKE 1 TABLET DAILY 90 tablet 3    hydroCHLOROthiazide (MICROZIDE) 12.5 MG capsule TAKE 1 CAPSULE DAILY 90 capsule 3    losartan (COZAAR) 100 MG tablet TAKE 1 TABLET DAILY 90 tablet 3    Probiotic Product (CULTURELLE PROBIOTICS PO) Take by mouth      vitamin C (ASCORBIC ACID) 500 MG tablet Take 1

## 2024-05-10 ENCOUNTER — TELEPHONE (OUTPATIENT)
Dept: UROLOGY | Age: 64
End: 2024-05-10

## 2024-05-11 LAB — BACTERIA UR CULT: NORMAL

## 2024-05-13 NOTE — TELEPHONE ENCOUNTER
Schedule appt next time we are in [place]- can overbook. Otherwise can be [facility] on Friday afternoon    The patient should go to the ED should they develop fever, chills, nausea, vomiting, chest pain, SOB, calf pain, feelings of incomplete emptying, or should they otherwise feel they need evaluated

## 2024-05-17 ENCOUNTER — TELEMEDICINE (OUTPATIENT)
Dept: UROLOGY | Age: 64
End: 2024-05-17
Payer: MEDICARE

## 2024-05-17 DIAGNOSIS — R31.9 HEMATURIA, UNSPECIFIED TYPE: Primary | ICD-10-CM

## 2024-05-17 PROCEDURE — G8428 CUR MEDS NOT DOCUMENT: HCPCS

## 2024-05-17 PROCEDURE — 3017F COLORECTAL CA SCREEN DOC REV: CPT

## 2024-05-17 PROCEDURE — G8417 CALC BMI ABV UP PARAM F/U: HCPCS

## 2024-05-17 PROCEDURE — 99213 OFFICE O/P EST LOW 20 MIN: CPT

## 2024-05-17 PROCEDURE — 1036F TOBACCO NON-USER: CPT

## 2024-05-17 PROCEDURE — 1123F ACP DISCUSS/DSCN MKR DOCD: CPT

## 2024-05-17 NOTE — PROGRESS NOTES
[x] No visualized mass     Pulmonary/Chest: [x] Respiratory effort normal.  [x] No visualized signs of difficulty breathing or respiratory distress        [] Abnormal-          Neurological:        [x] No Facial Asymmetry (Cranial nerve 7 motor function) (limited exam to video visit)          [x] No gaze palsy        [] Abnormal-         Skin:        [x] No significant exanthematous lesions or discoloration noted on facial skin         [] Abnormal-            Psychiatric:       [x] Normal Affect [x] No Hallucinations        [] Abnormal-     Other pertinent observable physical exam findings-     Labs  GFR >60 in 6/2023  Creatinine 0.6 in 6/2023      Lab Results   Component Value Date    PSA 6.37 (H) 05/06/2024    PSA 5.73 (H) 11/20/2023    PSA 6.04 (H) 02/28/2023    PSA 5.29 (H) 10/21/2020    PSA 5.01 (H) 02/28/2019    PSA 5.49 (H) 09/26/2018    PSA 4.26 (H) 02/22/2018    PSA 5.16 (H) 08/23/2017    PSA 5.00 (H) 01/12/2017    PSA 5.54 (H) 11/18/2016    PSA 4.10 (H) 09/22/2016    PSA 5.76 (H) 01/27/2016    PSA 2.60 (H) 05/31/2013    PSA 1.91 09/21/2010          ASSESSMENT:  Gross Hematuria  BPH  Elevated PSA  Atypical small acinar proliferation.   ED      PLAN    Gross hematuria: Negative work-up in 8/2022. Does not take blood thinners. Denies hx of smoking. Continues to report intermittent gross hematuria. Most recent episode was approximately 3 weeks ago. Will facilitate scheduling of CTU and cystoscopy for re-evaluation.      BPH:  s/p PVP with Dr. Hill in 5/2022. Doing well.      Elevated PSA: Stable, tends to oscillate between 5 and 6.  Underwent biopsy in 5/2023, which was negative for malignancy. Biopsy in 2019 with Atypical small acinar proliferation.     Will re-check PSA in 6 months      ED: Sildenafil prn. Patient understands that this medication should not be used in conjunction with nitroglycerin or Imdur.       Finn Live, was evaluated through a synchronous (real-time) audio-video encounter. The

## 2024-06-14 ENCOUNTER — HOSPITAL ENCOUNTER (OUTPATIENT)
Dept: CT IMAGING | Age: 64
Discharge: HOME | End: 2024-06-14
Payer: MEDICARE

## 2024-06-14 ENCOUNTER — TELEPHONE (OUTPATIENT)
Dept: UROLOGY | Age: 64
End: 2024-06-14

## 2024-06-14 DIAGNOSIS — R31.9 HEMATURIA, UNSPECIFIED TYPE: ICD-10-CM

## 2024-06-14 LAB
CREAT BLD-MCNC: 0.7 MG/DL (ref 0.5–1.2)
GFR SERPL CREATININE-BSD FRML MDRD: > 90 ML/MIN/1.73M2

## 2024-06-14 PROCEDURE — 74178 CT ABD&PLV WO CNTR FLWD CNTR: CPT

## 2024-06-14 PROCEDURE — 82565 ASSAY OF CREATININE: CPT

## 2024-06-14 PROCEDURE — 6360000004 HC RX CONTRAST MEDICATION

## 2024-06-14 RX ADMIN — IOPAMIDOL 85 ML: 755 INJECTION, SOLUTION INTRAVENOUS at 08:14

## 2024-06-14 NOTE — TELEPHONE ENCOUNTER
Reviewed the patient's CTU.   Recommend patient schedule cystoscopy as discussed to complete microhematuria.    I am happy to answer any and all questions    The patient should go to the ED should they develop fever, chills, nausea, vomiting, chest pain, SOB, calf pain, feelings of incomplete emptying, or should they otherwise feel they need evaluated

## 2024-06-17 DIAGNOSIS — I10 HYPERTENSION, UNSPECIFIED TYPE: ICD-10-CM

## 2024-06-19 ENCOUNTER — TELEPHONE (OUTPATIENT)
Dept: PULMONOLOGY | Age: 64
End: 2024-06-19

## 2024-06-19 DIAGNOSIS — G47.33 OBSTRUCTIVE SLEEP APNEA ON CPAP: Primary | ICD-10-CM

## 2024-06-19 RX ORDER — LOSARTAN POTASSIUM 100 MG/1
TABLET ORAL
Qty: 90 TABLET | Refills: 3 | Status: SHIPPED | OUTPATIENT
Start: 2024-06-19

## 2024-06-19 RX ORDER — HYDROCHLOROTHIAZIDE 12.5 MG/1
CAPSULE, GELATIN COATED ORAL
Qty: 90 CAPSULE | Refills: 3 | Status: SHIPPED | OUTPATIENT
Start: 2024-06-19

## 2024-06-19 NOTE — TELEPHONE ENCOUNTER
Patient's last appointment was : 1/2/2024 with our   Patient's next appointment is : 6/25/2024 with our Dr. Lee  Last refilled on: 3/02/2024  [facility]      Lab Results   Component Value Date    LABA1C 6.3 12/18/2023     Lab Results   Component Value Date    CHOL 190 06/12/2023    TRIG 113 06/12/2023    HDL 52 06/12/2023    LDLDIRECT 110.54 10/21/2020     Lab Results   Component Value Date     06/12/2023    K 4.4 06/12/2023    CL 97 (L) 06/12/2023    CO2 27 06/12/2023    BUN 12 06/12/2023    CREATININE 0.7 06/14/2024    GLUCOSE 162 (H) 06/12/2023    CALCIUM 10.0 06/12/2023    BILITOT 0.7 06/12/2023    ALKPHOS 106 06/12/2023    AST 20 06/12/2023    ALT 22 06/12/2023    LABGLOM > 90 06/14/2024     Lab Results   Component Value Date    TSH 1.840 06/12/2023    T4FREE 1.03 06/12/2023     Lab Results   Component Value Date    WBC 7.6 06/12/2023    HGB 14.5 06/12/2023    HCT 45.8 06/12/2023    MCV 94.0 06/12/2023     06/12/2023

## 2024-06-19 NOTE — TELEPHONE ENCOUNTER
Verified this with [facility]. Last sleep study is from 2010. They will need him to complete a new sleep study.

## 2024-06-19 NOTE — TELEPHONE ENCOUNTER
Patient is calling and stating that he is on medicare and uses [facility]. He said that he is being told that they want him to have a sleep study in order for them to pay for his supplies & his cpap. Please advise

## 2024-06-19 NOTE — TELEPHONE ENCOUNTER
Order placed for split night  Instruct to not wear PAp for 3-5 days prior to sleep study to get best results

## 2024-06-24 ENCOUNTER — HOSPITAL ENCOUNTER (OUTPATIENT)
Age: 64
Discharge: HOME | End: 2024-06-24
Payer: MEDICARE

## 2024-06-24 DIAGNOSIS — E78.5 DYSLIPIDEMIA, GOAL LDL BELOW 70: ICD-10-CM

## 2024-06-24 DIAGNOSIS — I10 ESSENTIAL HYPERTENSION: ICD-10-CM

## 2024-06-24 DIAGNOSIS — E11.9 TYPE 2 DIABETES MELLITUS WITHOUT COMPLICATION, WITHOUT LONG-TERM CURRENT USE OF INSULIN (HCC): ICD-10-CM

## 2024-06-24 LAB
ALBUMIN SERPL BCG-MCNC: 4.4 G/DL (ref 3.5–5.1)
ALP SERPL-CCNC: 87 U/L (ref 38–126)
ALT SERPL W/O P-5'-P-CCNC: 16 U/L (ref 11–66)
ANION GAP SERPL CALC-SCNC: 10 MEQ/L (ref 8–16)
AST SERPL-CCNC: 18 U/L (ref 5–40)
BILIRUB SERPL-MCNC: 0.7 MG/DL (ref 0.3–1.2)
BUN SERPL-MCNC: 14 MG/DL (ref 7–22)
CALCIUM SERPL-MCNC: 9.7 MG/DL (ref 8.5–10.5)
CHLORIDE SERPL-SCNC: 101 MEQ/L (ref 98–111)
CHOLEST SERPL-MCNC: 120 MG/DL (ref 100–199)
CO2 SERPL-SCNC: 28 MEQ/L (ref 23–33)
CREAT SERPL-MCNC: 0.6 MG/DL (ref 0.4–1.2)
DEPRECATED MEAN GLUCOSE BLD GHB EST-ACNC: 135 MG/DL (ref 70–126)
DEPRECATED RDW RBC AUTO: 40.8 FL (ref 35–45)
ERYTHROCYTE [DISTWIDTH] IN BLOOD BY AUTOMATED COUNT: 12.1 % (ref 11.5–14.5)
GFR SERPL CREATININE-BSD FRML MDRD: > 90 ML/MIN/1.73M2
GLUCOSE SERPL-MCNC: 134 MG/DL (ref 70–108)
HBA1C MFR BLD HPLC: 6.5 % (ref 4.4–6.4)
HCT VFR BLD AUTO: 43.9 % (ref 42–52)
HDLC SERPL-MCNC: 51 MG/DL
HGB BLD-MCNC: 14.3 GM/DL (ref 14–18)
LDLC SERPL CALC-MCNC: 49 MG/DL
MCH RBC QN AUTO: 29.9 PG (ref 26–33)
MCHC RBC AUTO-ENTMCNC: 32.6 GM/DL (ref 32.2–35.5)
MCV RBC AUTO: 91.8 FL (ref 80–94)
PLATELET # BLD AUTO: 213 THOU/MM3 (ref 130–400)
PMV BLD AUTO: 10.7 FL (ref 9.4–12.4)
POTASSIUM SERPL-SCNC: 4.5 MEQ/L (ref 3.5–5.2)
PROT SERPL-MCNC: 7.5 G/DL (ref 6.1–8)
RBC # BLD AUTO: 4.78 MILL/MM3 (ref 4.7–6.1)
SODIUM SERPL-SCNC: 139 MEQ/L (ref 135–145)
TRIGL SERPL-MCNC: 101 MG/DL (ref 0–199)
WBC # BLD AUTO: 6.3 THOU/MM3 (ref 4.8–10.8)

## 2024-06-24 PROCEDURE — 36415 COLL VENOUS BLD VENIPUNCTURE: CPT

## 2024-06-24 PROCEDURE — 85027 COMPLETE CBC AUTOMATED: CPT

## 2024-06-24 PROCEDURE — 80061 LIPID PANEL: CPT

## 2024-06-24 PROCEDURE — 83036 HEMOGLOBIN GLYCOSYLATED A1C: CPT

## 2024-06-24 PROCEDURE — 80053 COMPREHEN METABOLIC PANEL: CPT

## 2024-06-24 SDOH — ECONOMIC STABILITY: HOUSING INSECURITY
IN THE LAST 12 MONTHS, WAS THERE A TIME WHEN YOU DID NOT HAVE A STEADY PLACE TO SLEEP OR SLEPT IN A SHELTER (INCLUDING NOW)?: NO

## 2024-06-24 SDOH — ECONOMIC STABILITY: FOOD INSECURITY: WITHIN THE PAST 12 MONTHS, THE FOOD YOU BOUGHT JUST DIDN'T LAST AND YOU DIDN'T HAVE MONEY TO GET MORE.: NEVER TRUE

## 2024-06-24 SDOH — ECONOMIC STABILITY: INCOME INSECURITY: HOW HARD IS IT FOR YOU TO PAY FOR THE VERY BASICS LIKE FOOD, HOUSING, MEDICAL CARE, AND HEATING?: NOT HARD AT ALL

## 2024-06-24 SDOH — ECONOMIC STABILITY: FOOD INSECURITY: WITHIN THE PAST 12 MONTHS, YOU WORRIED THAT YOUR FOOD WOULD RUN OUT BEFORE YOU GOT MONEY TO BUY MORE.: NEVER TRUE

## 2024-06-24 SDOH — ECONOMIC STABILITY: TRANSPORTATION INSECURITY
IN THE PAST 12 MONTHS, HAS LACK OF TRANSPORTATION KEPT YOU FROM MEETINGS, WORK, OR FROM GETTING THINGS NEEDED FOR DAILY LIVING?: NO

## 2024-06-25 ENCOUNTER — OFFICE VISIT (OUTPATIENT)
Dept: FAMILY MEDICINE CLINIC | Age: 64
End: 2024-06-25
Payer: MEDICARE

## 2024-06-25 VITALS
RESPIRATION RATE: 18 BRPM | SYSTOLIC BLOOD PRESSURE: 130 MMHG | OXYGEN SATURATION: 97 % | DIASTOLIC BLOOD PRESSURE: 70 MMHG | HEIGHT: 72 IN | WEIGHT: 315 LBS | TEMPERATURE: 97.9 F | BODY MASS INDEX: 42.66 KG/M2 | HEART RATE: 86 BPM

## 2024-06-25 DIAGNOSIS — E78.5 DYSLIPIDEMIA, GOAL LDL BELOW 70: ICD-10-CM

## 2024-06-25 DIAGNOSIS — I10 ESSENTIAL HYPERTENSION: Primary | ICD-10-CM

## 2024-06-25 DIAGNOSIS — E11.9 TYPE 2 DIABETES MELLITUS WITHOUT COMPLICATION, UNSPECIFIED WHETHER LONG TERM INSULIN USE (HCC): ICD-10-CM

## 2024-06-25 PROCEDURE — 2022F DILAT RTA XM EVC RTNOPTHY: CPT | Performed by: STUDENT IN AN ORGANIZED HEALTH CARE EDUCATION/TRAINING PROGRAM

## 2024-06-25 PROCEDURE — 99214 OFFICE O/P EST MOD 30 MIN: CPT | Performed by: STUDENT IN AN ORGANIZED HEALTH CARE EDUCATION/TRAINING PROGRAM

## 2024-06-25 PROCEDURE — 1036F TOBACCO NON-USER: CPT | Performed by: STUDENT IN AN ORGANIZED HEALTH CARE EDUCATION/TRAINING PROGRAM

## 2024-06-25 PROCEDURE — 3075F SYST BP GE 130 - 139MM HG: CPT | Performed by: STUDENT IN AN ORGANIZED HEALTH CARE EDUCATION/TRAINING PROGRAM

## 2024-06-25 PROCEDURE — 3017F COLORECTAL CA SCREEN DOC REV: CPT | Performed by: STUDENT IN AN ORGANIZED HEALTH CARE EDUCATION/TRAINING PROGRAM

## 2024-06-25 PROCEDURE — 1123F ACP DISCUSS/DSCN MKR DOCD: CPT | Performed by: STUDENT IN AN ORGANIZED HEALTH CARE EDUCATION/TRAINING PROGRAM

## 2024-06-25 PROCEDURE — 3044F HG A1C LEVEL LT 7.0%: CPT | Performed by: STUDENT IN AN ORGANIZED HEALTH CARE EDUCATION/TRAINING PROGRAM

## 2024-06-25 PROCEDURE — G8417 CALC BMI ABV UP PARAM F/U: HCPCS | Performed by: STUDENT IN AN ORGANIZED HEALTH CARE EDUCATION/TRAINING PROGRAM

## 2024-06-25 PROCEDURE — 3078F DIAST BP <80 MM HG: CPT | Performed by: STUDENT IN AN ORGANIZED HEALTH CARE EDUCATION/TRAINING PROGRAM

## 2024-06-25 PROCEDURE — G8427 DOCREV CUR MEDS BY ELIG CLIN: HCPCS | Performed by: STUDENT IN AN ORGANIZED HEALTH CARE EDUCATION/TRAINING PROGRAM

## 2024-06-25 RX ORDER — METFORMIN HYDROCHLORIDE 500 MG/1
TABLET, EXTENDED RELEASE ORAL
Qty: 180 TABLET | Refills: 3 | Status: SHIPPED | OUTPATIENT
Start: 2024-06-25

## 2024-06-25 NOTE — PROGRESS NOTES
S: 64 y.o. male with   Chief Complaint   Patient presents with    Follow-up     DM II       Chief complaint, [place], and all pertinent details of the case reviewed with the resident.    Please see resident's note for specific details discussed at today's visit.    BP Readings from Last 3 Encounters:   06/25/24 130/70   03/11/24 138/64   01/02/24 124/78     Wt Readings from Last 3 Encounters:   06/25/24 (!) 145.5 kg (320 lb 12.8 oz)   05/09/24 (!) 152.4 kg (336 lb)   03/11/24 (!) 150.9 kg (332 lb 9.6 oz)       O: VS:  height is 1.829 m (6') and weight is 145.5 kg (320 lb 12.8 oz) (abnormal). His temporal temperature is 97.9 °F (36.6 °C). His blood pressure is 130/70 and his pulse is 86. His respiration is 18 and oxygen saturation is 97%.   AAO/NAD, appropriate affect for mood, truncal obesity  A1c 6.3 to 6.5 , LDL 49, HDL 51, tgl 101, crt 0.6  A:   Diagnosis Orders   1. Essential hypertension  Microalbumin / Creatinine Urine Ratio      2. Type 2 diabetes mellitus without complication, unspecified whether long term insulin use (HCC)  metFORMIN (GLUCOPHAGE-XR) 500 MG extended release tablet    Hemoglobin A1C    Microalbumin / Creatinine Urine Ratio          Plan:  Improve diet and exercise  Pt not inclined to increase any meds currently  Refill metformin as helping with DM  F/u in 6 months    Health Maintenance Due   Topic Date Due    Shingles vaccine (2 of 3) 03/25/2016    Respiratory Syncytial Virus (RSV) Pregnant or age 60 yrs+ (1 - 1-dose 60+ series) Never done    Pneumococcal 65+ years Vaccine (2 of 2 - PCV) 10/09/2020    DTaP/Tdap/Td vaccine (2 - Td or Tdap) 06/14/2021    COVID-19 Vaccine (4 - 2023-24 season) 09/01/2023    Annual Wellness Visit (Medicare)  Never done    Diabetic Alb to Cr ratio (uACR) test  06/12/2024       Attending Physician Statement  I have discussed the case, including pertinent history and exam findings with the resident. I also have seen the patient and performed key portions of the 
treated: Yes      HDL Cholesterol: 52 mg/dL      Total Cholesterol: 190 mg/dL    GEM on CPAP  Follow up with sleep medicine, sleep study end of July 2024     Recommend completing shingles vaccine.  Recommend RSV, pneumococcal and Tdap vaccines  Recommend influenza and COVID vaccines.    Return in about 6 months (around 12/25/2024) for DM and HTN.    SUBJECTIVE/OBJECTIVE:  HPI  Patient presents to clinic for follow-up of diabetes and hypertension.  Patient reports no concerns or complaints.  He would like to continue with metformin.  Would like to hold on starting GLP-1  Insurance would not cover ozempic with prior insurance coverage.     Review of Systems  Constitutional: Negative for chills, diaphoresis and fever.   HENT: Negative for congestion and sore throat.    Eyes: Negative for redness and visual disturbance.   Respiratory: Negative for cough, chest tightness and shortness of breath.    Cardiovascular: Negative for palpitations and leg swelling.   Genitourinary: Negative for difficulty urinating and dysuria.   Musculoskeletal: Negative for back pain and neck pain.   Skin: Negative for color change and pallor.   Neurological: Negative for dizziness and light-headedness.   Psychiatric/Behavioral: Negative for agitation and confusion. The patient is not nervous/anxious    Physical Exam  General appearance: No apparent distress, appears stated age and cooperative.  HEENT: Pupils equal, round, and reactive to light. Conjunctivae/corneas clear.  Neck: Supple, with full range of motion. No jugular venous distention. Trachea midline.  Respiratory:  Normal respiratory effort. Clear to auscultation  Cardiovascular: Regular rate and rhythm   Abdomen: Soft, non-tender, non-distended with normal bowel sounds.  Musculoskeletal: passive and active ROM x 4 extremities.  No lower extremity edema.  Skin: Skin color, texture, turgor normal.  No rashes or lesions.   Neurologic:  Neurovascularly intact without any focal

## 2024-06-28 DIAGNOSIS — F32.A DEPRESSION, UNSPECIFIED DEPRESSION TYPE: ICD-10-CM

## 2024-07-01 NOTE — TELEPHONE ENCOUNTER
Patient's last appointment was : .6/25/2024with our Dr Lee  Patient's next appointment is : Visit date not found   Last refilled on: 4/3/2024  [facility]      Lab Results   Component Value Date    LABA1C 6.5 (H) 06/24/2024     Lab Results   Component Value Date    CHOL 120 06/24/2024    TRIG 101 06/24/2024    HDL 51 06/24/2024    LDLDIRECT 110.54 10/21/2020     Lab Results   Component Value Date     06/24/2024    K 4.5 06/24/2024     06/24/2024    CO2 28 06/24/2024    BUN 14 06/24/2024    CREATININE 0.6 06/24/2024    GLUCOSE 134 (H) 06/24/2024    CALCIUM 9.7 06/24/2024    BILITOT 0.7 06/24/2024    ALKPHOS 87 06/24/2024    AST 18 06/24/2024    ALT 16 06/24/2024    LABGLOM > 90 06/24/2024     Lab Results   Component Value Date    TSH 1.840 06/12/2023    T4FREE 1.03 06/12/2023     Lab Results   Component Value Date    WBC 6.3 06/24/2024    HGB 14.3 06/24/2024    HCT 43.9 06/24/2024    MCV 91.8 06/24/2024     06/24/2024

## 2024-07-16 ENCOUNTER — PROCEDURE VISIT (OUTPATIENT)
Dept: UROLOGY | Age: 64
End: 2024-07-16
Payer: MEDICARE

## 2024-07-16 VITALS — WEIGHT: 315 LBS | BODY MASS INDEX: 42.66 KG/M2 | RESPIRATION RATE: 20 BRPM | HEIGHT: 72 IN

## 2024-07-16 DIAGNOSIS — N52.9 ERECTILE DYSFUNCTION, UNSPECIFIED ERECTILE DYSFUNCTION TYPE: ICD-10-CM

## 2024-07-16 DIAGNOSIS — N40.0 BENIGN PROSTATIC HYPERPLASIA, UNSPECIFIED WHETHER LOWER URINARY TRACT SYMPTOMS PRESENT: ICD-10-CM

## 2024-07-16 DIAGNOSIS — R97.20 ELEVATED PSA: ICD-10-CM

## 2024-07-16 DIAGNOSIS — R31.9 HEMATURIA, UNSPECIFIED TYPE: Primary | ICD-10-CM

## 2024-07-16 PROCEDURE — 52000 CYSTOURETHROSCOPY: CPT | Performed by: UROLOGY

## 2024-07-16 NOTE — PROGRESS NOTES
Cystoscopy    Operative Note    Patient:  Finn Live  MRN: [id number]  YOB: 1960    Date: 07/16/24  Surgeon: BALDO POSEY MD  Anesthesia: Urojet Local  Indications: gross hematuria BPH elevated psa  Position: Supine  EBL: 0 ml    Findings:   The patient was prepped and draped in the usual sterile fashion.  The flexible cystoscope was advanced through the urethra and into the bladder.  The bladder was thoroughly inspected and the following was noted:    Residual Urine: moderate.  Urine clear, with no obvious infection  Urethra: No abnormalities of the urethra are noted. Urethral dilation was not performed.    Prostate:  open bladder neck some regrowth anteriorly. Calcifications dystrophic , intravesical extension of prostate was present. There was a previous TURP defect.   Bladder: no tumor noted .   Moderate trabeculation noted.  no bladder diverticulum.  Ureters: Orifices with normal configuration and location.      The cystoscope was removed.  The patient tolerated the procedure well.  Discussed repeat outlet surgery with turp/resurfacing but he's voiding well  Bleeding is from dystrophic calcifcations that are not bothering him  Very large prostate some regrowth (60) but doing well we will follow     PSA six months with pvr with ken

## 2024-07-30 ENCOUNTER — HOSPITAL ENCOUNTER (OUTPATIENT)
Dept: SLEEP CENTER | Age: 64
Discharge: HOME | End: 2024-08-01
Payer: MEDICARE

## 2024-07-30 DIAGNOSIS — G47.33 OBSTRUCTIVE SLEEP APNEA ON CPAP: ICD-10-CM

## 2024-07-30 PROCEDURE — 95811 POLYSOM 6/>YRS CPAP 4/> PARM: CPT

## 2024-07-31 NOTE — PROGRESS NOTES
Title:  CPAP/BiLevel Titration's    Approved by:  Jennifer Deleon MD      Approval Date:  March, 2024 Next Review:  March, 2026     Responsible Party: [facility] Institution/Entities Applies to:   [facility]   Policy Number:  None    Document Type:  Such as Guideline, Policy, Policy & Procedure, or Procedure, Instructions  Manual:  Policy and Procedures    Section: IV Policy Start Date: October, 2000         POLICY: Positive airway pressure device is used to treat patients with sleep related breathing disorders characterized by full or partial occlusion of the upper airway during sleep. A patient must have undergone polysomnography and diagnosed with obstructive sleep apnea.    All individuals who record sleep studies must follow best practices for CPAP/bilevel/ASV titrations in order to attain the ideal pressure setting for their patients. Too low of pressure may cause patients to either be sub-optimally treated or to wake up in a panic. Too much pressure may cause the patient to experience bloating or mask leakage. Determining the appropriate pressure setting for each patient will lead to improved adherence and outcome. Titrations are not an exact science, and it is understood that technologists may need to make minor changes for individual patients. The procedures outlined below are meant to be a guideline and follow the spirit of the AASM clinical guidelines.      PROCEDURE:    CPAP:    Review the patients pertinent medical al history, previous sleep study or studies to ass the severity of sleep disordered breathing. Review of pertinent information will help to attain a better titration.   Applications of electrodes, montages, filters, sensitivities and scoring will be performed according to the current version of the AASM Scoring Manual.   Prior to initiating study collect all appropriate PAP supplies  Tubing   Humidifier (filled with distilled

## 2024-08-02 ENCOUNTER — TELEPHONE (OUTPATIENT)
Dept: SLEEP CENTER | Age: 64
End: 2024-08-02

## 2024-08-02 ENCOUNTER — PATIENT MESSAGE (OUTPATIENT)
Dept: PULMONOLOGY | Age: 64
End: 2024-08-02

## 2024-08-14 DIAGNOSIS — E78.5 DYSLIPIDEMIA, GOAL LDL BELOW 70: ICD-10-CM

## 2024-08-14 DIAGNOSIS — E11.9 TYPE 2 DIABETES MELLITUS WITHOUT COMPLICATION, UNSPECIFIED WHETHER LONG TERM INSULIN USE (HCC): ICD-10-CM

## 2024-08-14 NOTE — TELEPHONE ENCOUNTER
Patient called back and stating he is renting a machine right now with [facility] and he was asking if Medicare would want him to get a new machine for them to pay for and him still renting the machine he has or would Medicare take over that machine he is renting or getting a whole new machine. I advised him he would have to call [facility] to get those answers since I wasn't sure how they do that over at [facility]. Patient voiced his understanding.

## 2024-08-14 NOTE — TELEPHONE ENCOUNTER
Patient's last appointment was : 6/25/2024 with our   Patient's next appointment is : Visit date not found with our  Nothing scheduled.   Which pharmacy does the script need sent to:   [facility] - [place], [place] -         Lab Results   Component Value Date    LABA1C 6.5 (H) 06/24/2024     Lab Results   Component Value Date    CHOL 120 06/24/2024    TRIG 101 06/24/2024    HDL 51 06/24/2024    LDLDIRECT 110.54 10/21/2020     Lab Results   Component Value Date     06/24/2024    K 4.5 06/24/2024     06/24/2024    CO2 28 06/24/2024    BUN 14 06/24/2024    CREATININE 0.6 06/24/2024    GLUCOSE 134 (H) 06/24/2024    CALCIUM 9.7 06/24/2024    BILITOT 0.7 06/24/2024    ALKPHOS 87 06/24/2024    AST 18 06/24/2024    ALT 16 06/24/2024    LABGLOM > 90 06/24/2024     Lab Results   Component Value Date    TSH 1.840 06/12/2023    T4FREE 1.03 06/12/2023     Lab Results   Component Value Date    WBC 6.3 06/24/2024    HGB 14.3 06/24/2024    HCT 43.9 06/24/2024    MCV 91.8 06/24/2024     06/24/2024

## 2024-08-15 RX ORDER — ROSUVASTATIN CALCIUM 5 MG/1
5 TABLET, COATED ORAL NIGHTLY
Qty: 90 TABLET | Refills: 3 | Status: SHIPPED | OUTPATIENT
Start: 2024-08-15

## 2025-01-16 ENCOUNTER — HOSPITAL ENCOUNTER (OUTPATIENT)
Age: 65
Discharge: HOME | End: 2025-01-16
Payer: MEDICARE

## 2025-01-16 DIAGNOSIS — R97.20 ELEVATED PSA: ICD-10-CM

## 2025-01-16 PROCEDURE — 36415 COLL VENOUS BLD VENIPUNCTURE: CPT

## 2025-01-16 PROCEDURE — 84153 ASSAY OF PSA TOTAL: CPT

## 2025-01-17 LAB — PSA SERPL-MCNC: 6.99 NG/ML (ref 0–1)

## 2025-01-21 ENCOUNTER — OFFICE VISIT (OUTPATIENT)
Dept: UROLOGY | Age: 65
End: 2025-01-21
Payer: MEDICARE

## 2025-01-21 VITALS — WEIGHT: 315 LBS | BODY MASS INDEX: 42.66 KG/M2 | HEIGHT: 72 IN | RESPIRATION RATE: 20 BRPM

## 2025-01-21 DIAGNOSIS — R31.9 HEMATURIA, UNSPECIFIED TYPE: ICD-10-CM

## 2025-01-21 DIAGNOSIS — N40.0 BENIGN PROSTATIC HYPERPLASIA, UNSPECIFIED WHETHER LOWER URINARY TRACT SYMPTOMS PRESENT: ICD-10-CM

## 2025-01-21 DIAGNOSIS — N52.9 ERECTILE DYSFUNCTION, UNSPECIFIED ERECTILE DYSFUNCTION TYPE: ICD-10-CM

## 2025-01-21 DIAGNOSIS — R97.20 ELEVATED PSA: Primary | ICD-10-CM

## 2025-01-21 PROCEDURE — 1123F ACP DISCUSS/DSCN MKR DOCD: CPT | Performed by: UROLOGY

## 2025-01-21 PROCEDURE — G8427 DOCREV CUR MEDS BY ELIG CLIN: HCPCS | Performed by: UROLOGY

## 2025-01-21 PROCEDURE — G8417 CALC BMI ABV UP PARAM F/U: HCPCS | Performed by: UROLOGY

## 2025-01-21 PROCEDURE — 3017F COLORECTAL CA SCREEN DOC REV: CPT | Performed by: UROLOGY

## 2025-01-21 PROCEDURE — 1036F TOBACCO NON-USER: CPT | Performed by: UROLOGY

## 2025-01-21 PROCEDURE — 99214 OFFICE O/P EST MOD 30 MIN: CPT | Performed by: UROLOGY

## 2025-01-21 NOTE — PROGRESS NOTES
Damion Hill MD.     [facility] [place] [facility]  [facility] UROLOGY  [address]  [address]  [facility] [address]  Dept: [phone]  Dept Fax: [phone]  Loc: [phone]    [facility] Urology Office Note -     Patient:  Finn Live  YOB: 1960    The patient is a 65 y.o. male who presents today for evaluation of the following problems:   Chief Complaint   Patient presents with    Follow-up     No urinary concerns at the moment    Elevated PSA    Results     Psa done        History of Present Illness:    Gross hematuria  Secondary to infections and dystrophic calcifications  No recent issues with this.    BPH  Hx of PVP with dystrophic calcifications in prostatic fossa  Last cystoscopy showed open bladder neck  Pt very happy with flow    Elevated PSA  Rising PSA-- slight rise- but has been high for years  Last MRI 2019  Biopsy with Dr Berrios in past          Requested/reviewed records from Jules Coughlin MD office and/or outside physician/EMR    (Patient's old records have been requested, reviewed and pertinent findings summarized in today's note.)    Procedures Today: N/A    Last several PSA's:  Lab Results   Component Value Date    PSA 6.99 (H) 01/16/2025    PSA 6.37 (H) 05/06/2024    PSA 5.73 (H) 11/20/2023       Last total testosterone:  No results found for: \"TESTOSTERONE\"    Urinalysis today:  No results found for this visit on 01/21/25.      Last BUN and creatinine:  Lab Results   Component Value Date    BUN 14 06/24/2024     Lab Results   Component Value Date    CREATININE 0.6 06/24/2024       Imaging Reviewed during this Office Visit:   DAMION HILL MD independently reviewed the images and verified the radiology reports from:    CT ABDOMEN PELVIS WO CONTRAST Additional Contrast? None    Result Date: 8/13/2022  PROCEDURE: CT ABDOMEN PELVIS WO CONTRAST CLINICAL INFORMATION: hematuria . Pain. COMPARISON: CT abdomen pelvis dated 3/21/2014. TECHNIQUE: Axial 5

## 2025-04-21 ENCOUNTER — OFFICE VISIT (OUTPATIENT)
Age: 65
End: 2025-04-21
Payer: MEDICARE

## 2025-04-21 VITALS
BODY MASS INDEX: 42.66 KG/M2 | WEIGHT: 315 LBS | HEIGHT: 72 IN | HEART RATE: 63 BPM | OXYGEN SATURATION: 96 % | SYSTOLIC BLOOD PRESSURE: 132 MMHG | TEMPERATURE: 98.2 F | DIASTOLIC BLOOD PRESSURE: 80 MMHG

## 2025-04-21 DIAGNOSIS — G47.33 OBSTRUCTIVE SLEEP APNEA ON CPAP: Primary | ICD-10-CM

## 2025-04-21 DIAGNOSIS — E66.01 MORBID OBESITY WITH BMI OF 45.0-49.9, ADULT (HCC): ICD-10-CM

## 2025-04-21 PROCEDURE — 3017F COLORECTAL CA SCREEN DOC REV: CPT

## 2025-04-21 PROCEDURE — 3075F SYST BP GE 130 - 139MM HG: CPT

## 2025-04-21 PROCEDURE — 99213 OFFICE O/P EST LOW 20 MIN: CPT

## 2025-04-21 PROCEDURE — 1036F TOBACCO NON-USER: CPT

## 2025-04-21 PROCEDURE — 1123F ACP DISCUSS/DSCN MKR DOCD: CPT

## 2025-04-21 PROCEDURE — 1160F RVW MEDS BY RX/DR IN RCRD: CPT

## 2025-04-21 PROCEDURE — G8417 CALC BMI ABV UP PARAM F/U: HCPCS

## 2025-04-21 PROCEDURE — 3079F DIAST BP 80-89 MM HG: CPT

## 2025-04-21 PROCEDURE — 1159F MED LIST DOCD IN RCRD: CPT

## 2025-04-21 PROCEDURE — G8427 DOCREV CUR MEDS BY ELIG CLIN: HCPCS

## 2025-04-21 ASSESSMENT — ENCOUNTER SYMPTOMS
SINUS PRESSURE: 0
RHINORRHEA: 1
CHEST TIGHTNESS: 0
SORE THROAT: 0
SHORTNESS OF BREATH: 0

## 2025-04-21 NOTE — PROGRESS NOTES
[facility]     EMMA TELLES, 65 y.o.   : 1960  Day of encounter: 2025   Previously seen by Dr. Deleon, Elo Joshi PA-C     Subjective   Emma is here for 10 month follow up for GEM.   Aaron presents to the sleep clinic today alone with no acute complaints or concerns. Report no significant changes in daytimes sleepiness or drowsiness, or overall sleep quality. Reports he occasionally has to get up in the middle of the night to use the bathroom, however not nightly. Otherwise reports consistent runny nose in the AM. Denies CP, chest tightness, shortness of breath.    SAQLI: 95  [place] Sleepiness Scale: 0  Progress History:   Since last visit any new medical issues? No  New ER or hospitlal visits? No   Any new or changes in medicines? No    Initial AHI: 20 per study dated 3/1/2000     Past Medical hx   PMH:  Past Medical History:   Diagnosis Date    Anxiety     Depression     Hypertension     Obesity     Type 2 diabetes mellitus without complication, without long-term current use of insulin     doesnt take any medications at this time     Unspecified sleep apnea     Wears CPAP     SURGICAL HISTORY:  Past Surgical History:   Procedure Laterality Date    ABDOMEN SURGERY      APPENDECTOMY      BACK SURGERY      Herniated disc lumbar--Dr. Villarreal.    CHOLECYSTECTOMY      COLONOSCOPY      TURP N/A 2022    CYSTO, GREENLIGHT PHOTOVAPORIZATION OF THE PROSTATE performed by Damion Hill MD at [facility] OR     SOCIAL HISTORY:  Social History     Tobacco Use    Smoking status: Never    Smokeless tobacco: Never    Tobacco comments:     Never smoker   Vaping Use    Vaping status: Never Used   Substance Use Topics    Alcohol use: Yes     Alcohol/week: 0.0 standard drinks of alcohol     Comment: occasional    Drug use: No     ALLERGIES:No Known Allergies  FAMILY HISTORY:  Family History   Problem Relation Age of Onset    COPD Mother     Diabetes Father     Heart Attack

## 2025-05-19 ENCOUNTER — RESULTS FOLLOW-UP (OUTPATIENT)
Dept: FAMILY MEDICINE CLINIC | Age: 65
End: 2025-05-19

## 2025-05-19 ENCOUNTER — HOSPITAL ENCOUNTER (OUTPATIENT)
Age: 65
Discharge: HOME | End: 2025-05-19
Payer: MEDICARE

## 2025-05-19 DIAGNOSIS — I10 ESSENTIAL HYPERTENSION: ICD-10-CM

## 2025-05-19 DIAGNOSIS — E11.9 TYPE 2 DIABETES MELLITUS WITHOUT COMPLICATION, UNSPECIFIED WHETHER LONG TERM INSULIN USE (HCC): ICD-10-CM

## 2025-05-19 LAB
CREAT UR-MCNC: 145 MG/DL
DEPRECATED MEAN GLUCOSE BLD GHB EST-ACNC: 144 MG/DL (ref 70–126)
HBA1C MFR BLD HPLC: 6.8 % (ref 4–6)
MICROALBUMIN UR-MCNC: 9.55 MG/DL
MICROALBUMIN/CREAT RATIO PNL UR: 66 MG/G (ref 0–30)

## 2025-05-19 PROCEDURE — 83036 HEMOGLOBIN GLYCOSYLATED A1C: CPT

## 2025-05-19 PROCEDURE — 82043 UR ALBUMIN QUANTITATIVE: CPT

## 2025-05-19 PROCEDURE — 36415 COLL VENOUS BLD VENIPUNCTURE: CPT

## 2025-05-19 SDOH — ECONOMIC STABILITY: INCOME INSECURITY: IN THE LAST 12 MONTHS, WAS THERE A TIME WHEN YOU WERE NOT ABLE TO PAY THE MORTGAGE OR RENT ON TIME?: NO

## 2025-05-19 SDOH — ECONOMIC STABILITY: FOOD INSECURITY: WITHIN THE PAST 12 MONTHS, THE FOOD YOU BOUGHT JUST DIDN'T LAST AND YOU DIDN'T HAVE MONEY TO GET MORE.: NEVER TRUE

## 2025-05-19 SDOH — ECONOMIC STABILITY: FOOD INSECURITY: WITHIN THE PAST 12 MONTHS, YOU WORRIED THAT YOUR FOOD WOULD RUN OUT BEFORE YOU GOT MONEY TO BUY MORE.: NEVER TRUE

## 2025-05-19 SDOH — ECONOMIC STABILITY: TRANSPORTATION INSECURITY
IN THE PAST 12 MONTHS, HAS THE LACK OF TRANSPORTATION KEPT YOU FROM MEDICAL APPOINTMENTS OR FROM GETTING MEDICATIONS?: NO

## 2025-05-19 ASSESSMENT — PATIENT HEALTH QUESTIONNAIRE - PHQ9
6. FEELING BAD ABOUT YOURSELF - OR THAT YOU ARE A FAILURE OR HAVE LET YOURSELF OR YOUR FAMILY DOWN: NOT AT ALL
SUM OF ALL RESPONSES TO PHQ QUESTIONS 1-9: 0
6. FEELING BAD ABOUT YOURSELF - OR THAT YOU ARE A FAILURE OR HAVE LET YOURSELF OR YOUR FAMILY DOWN: NOT AT ALL
2. FEELING DOWN, DEPRESSED OR HOPELESS: NOT AT ALL
SUM OF ALL RESPONSES TO PHQ QUESTIONS 1-9: 0
8. MOVING OR SPEAKING SO SLOWLY THAT OTHER PEOPLE COULD HAVE NOTICED. OR THE OPPOSITE, BEING SO FIGETY OR RESTLESS THAT YOU HAVE BEEN MOVING AROUND A LOT MORE THAN USUAL: NOT AT ALL
3. TROUBLE FALLING OR STAYING ASLEEP: NOT AT ALL
5. POOR APPETITE OR OVEREATING: NOT AT ALL
SUM OF ALL RESPONSES TO PHQ QUESTIONS 1-9: 0
SUM OF ALL RESPONSES TO PHQ QUESTIONS 1-9: 0
3. TROUBLE FALLING OR STAYING ASLEEP: NOT AT ALL
1. LITTLE INTEREST OR PLEASURE IN DOING THINGS: NOT AT ALL
4. FEELING TIRED OR HAVING LITTLE ENERGY: NOT AT ALL
10. IF YOU CHECKED OFF ANY PROBLEMS, HOW DIFFICULT HAVE THESE PROBLEMS MADE IT FOR YOU TO DO YOUR WORK, TAKE CARE OF THINGS AT HOME, OR GET ALONG WITH OTHER PEOPLE: NOT DIFFICULT AT ALL
9. THOUGHTS THAT YOU WOULD BE BETTER OFF DEAD, OR OF HURTING YOURSELF: NOT AT ALL
SUM OF ALL RESPONSES TO PHQ QUESTIONS 1-9: 0
1. LITTLE INTEREST OR PLEASURE IN DOING THINGS: NOT AT ALL
7. TROUBLE CONCENTRATING ON THINGS, SUCH AS READING THE NEWSPAPER OR WATCHING TELEVISION: NOT AT ALL
7. TROUBLE CONCENTRATING ON THINGS, SUCH AS READING THE NEWSPAPER OR WATCHING TELEVISION: NOT AT ALL
4. FEELING TIRED OR HAVING LITTLE ENERGY: NOT AT ALL
5. POOR APPETITE OR OVEREATING: NOT AT ALL
10. IF YOU CHECKED OFF ANY PROBLEMS, HOW DIFFICULT HAVE THESE PROBLEMS MADE IT FOR YOU TO DO YOUR WORK, TAKE CARE OF THINGS AT HOME, OR GET ALONG WITH OTHER PEOPLE: NOT DIFFICULT AT ALL
9. THOUGHTS THAT YOU WOULD BE BETTER OFF DEAD, OR OF HURTING YOURSELF: NOT AT ALL
2. FEELING DOWN, DEPRESSED OR HOPELESS: NOT AT ALL
8. MOVING OR SPEAKING SO SLOWLY THAT OTHER PEOPLE COULD HAVE NOTICED. OR THE OPPOSITE - BEING SO FIDGETY OR RESTLESS THAT YOU HAVE BEEN MOVING AROUND A LOT MORE THAN USUAL: NOT AT ALL

## 2025-05-22 ENCOUNTER — OFFICE VISIT (OUTPATIENT)
Dept: FAMILY MEDICINE CLINIC | Age: 65
End: 2025-05-22
Payer: MEDICARE

## 2025-05-22 VITALS
OXYGEN SATURATION: 98 % | HEART RATE: 72 BPM | TEMPERATURE: 97.7 F | HEIGHT: 72 IN | SYSTOLIC BLOOD PRESSURE: 144 MMHG | RESPIRATION RATE: 13 BRPM | WEIGHT: 315 LBS | DIASTOLIC BLOOD PRESSURE: 82 MMHG | BODY MASS INDEX: 42.66 KG/M2

## 2025-05-22 DIAGNOSIS — D17.0 LIPOMA OF NECK: ICD-10-CM

## 2025-05-22 DIAGNOSIS — E11.9 TYPE 2 DIABETES MELLITUS WITHOUT COMPLICATION, UNSPECIFIED WHETHER LONG TERM INSULIN USE (HCC): ICD-10-CM

## 2025-05-22 DIAGNOSIS — E78.5 DYSLIPIDEMIA, GOAL LDL BELOW 70: ICD-10-CM

## 2025-05-22 DIAGNOSIS — I10 HYPERTENSION, UNSPECIFIED TYPE: ICD-10-CM

## 2025-05-22 DIAGNOSIS — R80.9 MICROALBUMINURIA: ICD-10-CM

## 2025-05-22 DIAGNOSIS — F32.A DEPRESSION, UNSPECIFIED DEPRESSION TYPE: ICD-10-CM

## 2025-05-22 DIAGNOSIS — E11.29 TYPE 2 DIABETES MELLITUS WITH DIABETIC MICROALBUMINURIA, WITHOUT LONG-TERM CURRENT USE OF INSULIN (HCC): Primary | ICD-10-CM

## 2025-05-22 DIAGNOSIS — R80.9 TYPE 2 DIABETES MELLITUS WITH DIABETIC MICROALBUMINURIA, WITHOUT LONG-TERM CURRENT USE OF INSULIN (HCC): Primary | ICD-10-CM

## 2025-05-22 DIAGNOSIS — E66.813 CLASS 3 SEVERE OBESITY WITH SERIOUS COMORBIDITY AND BODY MASS INDEX (BMI) OF 45.0 TO 49.9 IN ADULT, UNSPECIFIED OBESITY TYPE (HCC): ICD-10-CM

## 2025-05-22 PROCEDURE — 1036F TOBACCO NON-USER: CPT

## 2025-05-22 PROCEDURE — 1159F MED LIST DOCD IN RCRD: CPT

## 2025-05-22 PROCEDURE — G8417 CALC BMI ABV UP PARAM F/U: HCPCS

## 2025-05-22 PROCEDURE — 2022F DILAT RTA XM EVC RTNOPTHY: CPT

## 2025-05-22 PROCEDURE — 3017F COLORECTAL CA SCREEN DOC REV: CPT

## 2025-05-22 PROCEDURE — G8427 DOCREV CUR MEDS BY ELIG CLIN: HCPCS

## 2025-05-22 PROCEDURE — 3077F SYST BP >= 140 MM HG: CPT

## 2025-05-22 PROCEDURE — 3079F DIAST BP 80-89 MM HG: CPT

## 2025-05-22 PROCEDURE — 3044F HG A1C LEVEL LT 7.0%: CPT

## 2025-05-22 PROCEDURE — 99214 OFFICE O/P EST MOD 30 MIN: CPT

## 2025-05-22 PROCEDURE — 1123F ACP DISCUSS/DSCN MKR DOCD: CPT

## 2025-05-22 RX ORDER — BLOOD SUGAR DIAGNOSTIC
STRIP MISCELLANEOUS
Qty: 200 STRIP | Refills: 3 | Status: SHIPPED | OUTPATIENT
Start: 2025-05-22

## 2025-05-22 RX ORDER — HYDROCHLOROTHIAZIDE 12.5 MG/1
12.5 CAPSULE ORAL DAILY
Qty: 90 CAPSULE | Refills: 3 | Status: SHIPPED | OUTPATIENT
Start: 2025-05-22

## 2025-05-22 RX ORDER — METFORMIN HYDROCHLORIDE 500 MG/1
TABLET, EXTENDED RELEASE ORAL
Qty: 180 TABLET | Refills: 3 | Status: SHIPPED | OUTPATIENT
Start: 2025-05-22

## 2025-05-22 RX ORDER — LOSARTAN POTASSIUM 100 MG/1
100 TABLET ORAL DAILY
Qty: 90 TABLET | Refills: 3 | Status: SHIPPED | OUTPATIENT
Start: 2025-05-22

## 2025-05-22 RX ORDER — AVOBENZONE, HOMOSALATE, OCTISALATE, OCTOCRYLENE 30; 40; 45; 26 MG/ML; MG/ML; MG/ML; MG/ML
CREAM TOPICAL
Qty: 200 EACH | Refills: 3 | Status: SHIPPED | OUTPATIENT
Start: 2025-05-22

## 2025-05-22 ASSESSMENT — PATIENT HEALTH QUESTIONNAIRE - PHQ9
5. POOR APPETITE OR OVEREATING: NOT AT ALL
8. MOVING OR SPEAKING SO SLOWLY THAT OTHER PEOPLE COULD HAVE NOTICED. OR THE OPPOSITE, BEING SO FIGETY OR RESTLESS THAT YOU HAVE BEEN MOVING AROUND A LOT MORE THAN USUAL: NOT AT ALL
4. FEELING TIRED OR HAVING LITTLE ENERGY: NOT AT ALL
1. LITTLE INTEREST OR PLEASURE IN DOING THINGS: NOT AT ALL
2. FEELING DOWN, DEPRESSED OR HOPELESS: NOT AT ALL
9. THOUGHTS THAT YOU WOULD BE BETTER OFF DEAD, OR OF HURTING YOURSELF: NOT AT ALL
7. TROUBLE CONCENTRATING ON THINGS, SUCH AS READING THE NEWSPAPER OR WATCHING TELEVISION: NOT AT ALL
6. FEELING BAD ABOUT YOURSELF - OR THAT YOU ARE A FAILURE OR HAVE LET YOURSELF OR YOUR FAMILY DOWN: NOT AT ALL
SUM OF ALL RESPONSES TO PHQ QUESTIONS 1-9: 0
SUM OF ALL RESPONSES TO PHQ QUESTIONS 1-9: 0
10. IF YOU CHECKED OFF ANY PROBLEMS, HOW DIFFICULT HAVE THESE PROBLEMS MADE IT FOR YOU TO DO YOUR WORK, TAKE CARE OF THINGS AT HOME, OR GET ALONG WITH OTHER PEOPLE: NOT DIFFICULT AT ALL
3. TROUBLE FALLING OR STAYING ASLEEP: NOT AT ALL
SUM OF ALL RESPONSES TO PHQ QUESTIONS 1-9: 0
SUM OF ALL RESPONSES TO PHQ QUESTIONS 1-9: 0

## 2025-05-29 ASSESSMENT — ENCOUNTER SYMPTOMS
PHOTOPHOBIA: 0
VOMITING: 0
ABDOMINAL PAIN: 0
BLOOD IN STOOL: 0
WHEEZING: 0
DIARRHEA: 0
COUGH: 0
CONSTIPATION: 0
NAUSEA: 0

## 2025-07-21 ENCOUNTER — HOSPITAL ENCOUNTER (OUTPATIENT)
Age: 65
Discharge: HOME | End: 2025-07-21
Payer: MEDICARE

## 2025-07-21 DIAGNOSIS — R97.20 ELEVATED PSA: ICD-10-CM

## 2025-07-21 LAB — PSA SERPL-MCNC: 7.06 NG/ML (ref 0–1)

## 2025-07-21 PROCEDURE — 84153 ASSAY OF PSA TOTAL: CPT

## 2025-07-21 PROCEDURE — 36415 COLL VENOUS BLD VENIPUNCTURE: CPT

## 2025-07-23 DIAGNOSIS — E11.9 TYPE 2 DIABETES MELLITUS WITHOUT COMPLICATION, UNSPECIFIED WHETHER LONG TERM INSULIN USE (HCC): ICD-10-CM

## 2025-07-23 DIAGNOSIS — E78.5 DYSLIPIDEMIA, GOAL LDL BELOW 70: ICD-10-CM

## 2025-07-23 RX ORDER — ROSUVASTATIN CALCIUM 5 MG/1
5 TABLET, COATED ORAL NIGHTLY
Qty: 90 TABLET | Refills: 3 | Status: SHIPPED | OUTPATIENT
Start: 2025-07-23

## 2025-07-23 NOTE — TELEPHONE ENCOUNTER
Could we call patient to encourge him to complete his labs.   Refills sent to pharmacy.  Thanks!  Dr. Novak

## 2025-07-23 NOTE — TELEPHONE ENCOUNTER
Patient's last appointment was: 5/22/2025  with our   Patient's next appointment is: 11/24/2025  with our Dr. Novka  Last refilled on:  4/24/2025   Which pharmacy does the script need sent to: [facility] - [place], [place]       Lab Results   Component Value Date    LABA1C 6.8 (H) 05/19/2025     Lab Results   Component Value Date    CHOL 120 06/24/2024    TRIG 101 06/24/2024    HDL 51 06/24/2024    LDLDIRECT 110.54 10/21/2020     Lab Results   Component Value Date     06/24/2024    K 4.5 06/24/2024     06/24/2024    CO2 28 06/24/2024    BUN 14 06/24/2024    CREATININE 0.6 06/24/2024    GLUCOSE 134 (H) 06/24/2024    CALCIUM 9.7 06/24/2024    BILITOT 0.7 06/24/2024    ALKPHOS 87 06/24/2024    AST 18 06/24/2024    ALT 16 06/24/2024    LABGLOM > 90 06/24/2024     Lab Results   Component Value Date    TSH 1.840 06/12/2023    T4FREE 1.03 06/12/2023     Lab Results   Component Value Date    WBC 6.3 06/24/2024    HGB 14.3 06/24/2024    HCT 43.9 06/24/2024    MCV 91.8 06/24/2024     06/24/2024

## 2025-07-24 ENCOUNTER — OFFICE VISIT (OUTPATIENT)
Dept: UROLOGY | Age: 65
End: 2025-07-24
Payer: MEDICARE

## 2025-07-24 VITALS — WEIGHT: 315 LBS | HEIGHT: 72 IN | RESPIRATION RATE: 18 BRPM | BODY MASS INDEX: 42.66 KG/M2

## 2025-07-24 DIAGNOSIS — R31.9 HEMATURIA, UNSPECIFIED TYPE: ICD-10-CM

## 2025-07-24 DIAGNOSIS — N52.9 ERECTILE DYSFUNCTION, UNSPECIFIED ERECTILE DYSFUNCTION TYPE: ICD-10-CM

## 2025-07-24 DIAGNOSIS — R97.20 ELEVATED PSA: Primary | ICD-10-CM

## 2025-07-24 DIAGNOSIS — N40.0 BENIGN PROSTATIC HYPERPLASIA, UNSPECIFIED WHETHER LOWER URINARY TRACT SYMPTOMS PRESENT: ICD-10-CM

## 2025-07-24 PROCEDURE — 99214 OFFICE O/P EST MOD 30 MIN: CPT | Performed by: UROLOGY

## 2025-07-24 PROCEDURE — 3017F COLORECTAL CA SCREEN DOC REV: CPT | Performed by: UROLOGY

## 2025-07-24 PROCEDURE — 1036F TOBACCO NON-USER: CPT | Performed by: UROLOGY

## 2025-07-24 PROCEDURE — 1159F MED LIST DOCD IN RCRD: CPT | Performed by: UROLOGY

## 2025-07-24 PROCEDURE — G8417 CALC BMI ABV UP PARAM F/U: HCPCS | Performed by: UROLOGY

## 2025-07-24 PROCEDURE — 1123F ACP DISCUSS/DSCN MKR DOCD: CPT | Performed by: UROLOGY

## 2025-07-24 PROCEDURE — G8427 DOCREV CUR MEDS BY ELIG CLIN: HCPCS | Performed by: UROLOGY

## 2025-07-24 NOTE — PROGRESS NOTES
Damion Hill MD.     [facility] [place] [facility]  [facility] UROLOGY  [address]  [address]  [facility] [address]  Dept: [phone]  Dept Fax: [phone]  Loc: [phone]    [facility] Urology Office Note -     Patient:  Finn Live  YOB: 1960    The patient is a 65 y.o. male who presents today for evaluation of the following problems:   Chief Complaint   Patient presents with    Elevated PSA        History of Present Illness:    Gross hematuria:  Secondary to infections and dystrophic calcifications  He continues with episodic hematuria related to heavy lifting or straining d/t constipation. No recent hematuria.   170g prostate     BPH:  Hx of PVP with dystrophic calcifications in prostatic fossa  Last cystoscopy showed open bladder neck  Pt very happy with flow     Elevated PSA:  Rising PSA-- slight rise- but has been high for years  Last PSA of 7.06 on 7/21/25 (from 6.99 in Jan 2025)  Last MRI 2019  Neg biopsy 5/16/23 (has had 3 negative biopsies so far)        Summary of Previous Records:    Requested/reviewed records from Jules Coughlin MD office and/or outside physician/EMR    (Patient's old records have been requested, reviewed and pertinent findings summarized in today's note.)    Procedures Today: N/A    Last several PSA's:  Lab Results   Component Value Date    PSA 7.06 (H) 07/21/2025    PSA 6.99 (H) 01/16/2025    PSA 6.37 (H) 05/06/2024       Last total testosterone:  No results found for: \"TESTOSTERONE\"    Urinalysis today:  No results found for this visit on 07/24/25.      Last BUN and creatinine:  Lab Results   Component Value Date    BUN 14 06/24/2024     Lab Results   Component Value Date    CREATININE 0.6 06/24/2024       Imaging Reviewed during this Office Visit:   DAMION HILL MD independently reviewed the images and verified the radiology reports from:    CT ABDOMEN PELVIS WO CONTRAST Additional Contrast? None    Result Date:

## 2025-07-28 DIAGNOSIS — E66.813 CLASS 3 SEVERE OBESITY WITH SERIOUS COMORBIDITY AND BODY MASS INDEX (BMI) OF 45.0 TO 49.9 IN ADULT, UNSPECIFIED OBESITY TYPE (HCC): ICD-10-CM

## 2025-07-28 DIAGNOSIS — R80.9 MICROALBUMINURIA: ICD-10-CM

## 2025-07-28 DIAGNOSIS — E11.29 TYPE 2 DIABETES MELLITUS WITH DIABETIC MICROALBUMINURIA, WITHOUT LONG-TERM CURRENT USE OF INSULIN (HCC): ICD-10-CM

## 2025-07-28 DIAGNOSIS — R80.9 TYPE 2 DIABETES MELLITUS WITH DIABETIC MICROALBUMINURIA, WITHOUT LONG-TERM CURRENT USE OF INSULIN (HCC): ICD-10-CM

## 2025-07-28 RX ORDER — SEMAGLUTIDE 0.68 MG/ML
INJECTION, SOLUTION SUBCUTANEOUS
Qty: 4.5 ML | Refills: 0 | Status: SHIPPED | OUTPATIENT
Start: 2025-07-28

## 2025-07-28 NOTE — TELEPHONE ENCOUNTER
OK for refill for 90 days with no further refills.      Patient should schedule appointment to be seen within the next month to follow-up on how he is doing with medication.  Resident schedule okay.  Thanks.  ES

## 2025-07-28 NOTE — TELEPHONE ENCOUNTER
Finn Live returned call and states that he is taking Ozempic at 0.25 and would like to stay at the same dose. He also states that his next dose is due tomorrow and wants to know if he misses tomorrows dose if that would be OK. Please Advise.

## 2025-07-28 NOTE — TELEPHONE ENCOUNTER
As this refill appears to have been sent by insurance/pharmacy, he is patient currently using Ozempic?    If so, what dose?    If so, would patient like to continue current dose or increase dose at this time?    Side effects?      Please check with patient and let me know.  Thanks.  ES

## 2025-07-31 ENCOUNTER — OFFICE VISIT (OUTPATIENT)
Dept: SURGERY | Age: 65
End: 2025-07-31
Payer: MEDICARE

## 2025-07-31 VITALS
WEIGHT: 315 LBS | RESPIRATION RATE: 18 BRPM | SYSTOLIC BLOOD PRESSURE: 138 MMHG | BODY MASS INDEX: 42.66 KG/M2 | HEART RATE: 76 BPM | DIASTOLIC BLOOD PRESSURE: 84 MMHG | OXYGEN SATURATION: 98 % | HEIGHT: 72 IN | TEMPERATURE: 98.4 F

## 2025-07-31 DIAGNOSIS — L57.0 ACTINIC KERATOSIS: ICD-10-CM

## 2025-07-31 DIAGNOSIS — L72.3 SEBACEOUS CYST: Primary | ICD-10-CM

## 2025-07-31 PROCEDURE — 1036F TOBACCO NON-USER: CPT | Performed by: SURGERY

## 2025-07-31 PROCEDURE — 3017F COLORECTAL CA SCREEN DOC REV: CPT | Performed by: SURGERY

## 2025-07-31 PROCEDURE — 1123F ACP DISCUSS/DSCN MKR DOCD: CPT | Performed by: SURGERY

## 2025-07-31 PROCEDURE — 3075F SYST BP GE 130 - 139MM HG: CPT | Performed by: SURGERY

## 2025-07-31 PROCEDURE — 99203 OFFICE O/P NEW LOW 30 MIN: CPT | Performed by: SURGERY

## 2025-07-31 PROCEDURE — 1126F AMNT PAIN NOTED NONE PRSNT: CPT | Performed by: SURGERY

## 2025-07-31 PROCEDURE — G8417 CALC BMI ABV UP PARAM F/U: HCPCS | Performed by: SURGERY

## 2025-07-31 PROCEDURE — G8427 DOCREV CUR MEDS BY ELIG CLIN: HCPCS | Performed by: SURGERY

## 2025-07-31 PROCEDURE — 3079F DIAST BP 80-89 MM HG: CPT | Performed by: SURGERY

## 2025-07-31 PROCEDURE — 1159F MED LIST DOCD IN RCRD: CPT | Performed by: SURGERY

## 2025-08-01 ENCOUNTER — TELEPHONE (OUTPATIENT)
Dept: SURGERY | Age: 65
End: 2025-08-01

## 2025-08-01 NOTE — TELEPHONE ENCOUNTER
Referral faxed to Dr. Mcdonald's office yesterday.  I called today and they did receive it.  They will call the pt to schedule.

## 2025-08-05 ENCOUNTER — PROCEDURE VISIT (OUTPATIENT)
Dept: SURGERY | Age: 65
End: 2025-08-05

## 2025-08-05 VITALS
HEART RATE: 66 BPM | TEMPERATURE: 97 F | DIASTOLIC BLOOD PRESSURE: 86 MMHG | SYSTOLIC BLOOD PRESSURE: 134 MMHG | RESPIRATION RATE: 18 BRPM | OXYGEN SATURATION: 95 %

## 2025-08-05 DIAGNOSIS — L72.3 SEBACEOUS CYST: Primary | ICD-10-CM

## 2025-08-05 RX ORDER — SULFAMETHOXAZOLE AND TRIMETHOPRIM 800; 160 MG/1; MG/1
1 TABLET ORAL 2 TIMES DAILY
Qty: 20 TABLET | Refills: 0 | Status: SHIPPED | OUTPATIENT
Start: 2025-08-05 | End: 2025-08-15

## 2025-08-07 ENCOUNTER — OFFICE VISIT (OUTPATIENT)
Dept: SURGERY | Age: 65
End: 2025-08-07

## 2025-08-07 VITALS
WEIGHT: 315 LBS | OXYGEN SATURATION: 95 % | HEART RATE: 65 BPM | TEMPERATURE: 97.3 F | SYSTOLIC BLOOD PRESSURE: 142 MMHG | HEIGHT: 72 IN | BODY MASS INDEX: 42.66 KG/M2 | DIASTOLIC BLOOD PRESSURE: 70 MMHG

## 2025-08-07 DIAGNOSIS — Z09 POSTOP CHECK: Primary | ICD-10-CM

## 2025-08-07 PROCEDURE — 99024 POSTOP FOLLOW-UP VISIT: CPT | Performed by: SURGERY

## 2025-08-11 ENCOUNTER — OFFICE VISIT (OUTPATIENT)
Dept: FAMILY MEDICINE CLINIC | Age: 65
End: 2025-08-11
Payer: MEDICARE

## 2025-08-11 VITALS
HEART RATE: 77 BPM | DIASTOLIC BLOOD PRESSURE: 82 MMHG | HEIGHT: 72 IN | OXYGEN SATURATION: 96 % | WEIGHT: 313 LBS | TEMPERATURE: 97.6 F | BODY MASS INDEX: 42.39 KG/M2 | SYSTOLIC BLOOD PRESSURE: 130 MMHG | RESPIRATION RATE: 12 BRPM

## 2025-08-11 DIAGNOSIS — Z79.4 TYPE 2 DIABETES MELLITUS WITH DIABETIC MICROALBUMINURIA, WITH LONG-TERM CURRENT USE OF INSULIN (HCC): ICD-10-CM

## 2025-08-11 DIAGNOSIS — E66.813 CLASS 3 SEVERE OBESITY WITH SERIOUS COMORBIDITY AND BODY MASS INDEX (BMI) OF 45.0 TO 49.9 IN ADULT, UNSPECIFIED OBESITY TYPE (HCC): Primary | ICD-10-CM

## 2025-08-11 DIAGNOSIS — R80.9 TYPE 2 DIABETES MELLITUS WITH DIABETIC MICROALBUMINURIA, WITH LONG-TERM CURRENT USE OF INSULIN (HCC): ICD-10-CM

## 2025-08-11 DIAGNOSIS — E11.29 TYPE 2 DIABETES MELLITUS WITH DIABETIC MICROALBUMINURIA, WITH LONG-TERM CURRENT USE OF INSULIN (HCC): ICD-10-CM

## 2025-08-11 DIAGNOSIS — R80.9 MICROALBUMINURIA: ICD-10-CM

## 2025-08-11 PROCEDURE — 1160F RVW MEDS BY RX/DR IN RCRD: CPT

## 2025-08-11 PROCEDURE — 3075F SYST BP GE 130 - 139MM HG: CPT

## 2025-08-11 PROCEDURE — 1036F TOBACCO NON-USER: CPT

## 2025-08-11 PROCEDURE — 99214 OFFICE O/P EST MOD 30 MIN: CPT

## 2025-08-11 PROCEDURE — 3079F DIAST BP 80-89 MM HG: CPT

## 2025-08-11 PROCEDURE — 3017F COLORECTAL CA SCREEN DOC REV: CPT

## 2025-08-11 PROCEDURE — G8427 DOCREV CUR MEDS BY ELIG CLIN: HCPCS

## 2025-08-11 PROCEDURE — G8417 CALC BMI ABV UP PARAM F/U: HCPCS

## 2025-08-11 PROCEDURE — 1123F ACP DISCUSS/DSCN MKR DOCD: CPT

## 2025-08-11 PROCEDURE — 3044F HG A1C LEVEL LT 7.0%: CPT

## 2025-08-11 PROCEDURE — 2022F DILAT RTA XM EVC RTNOPTHY: CPT

## 2025-08-11 PROCEDURE — 1159F MED LIST DOCD IN RCRD: CPT

## 2025-08-11 RX ORDER — SEMAGLUTIDE 0.68 MG/ML
0.5 INJECTION, SOLUTION SUBCUTANEOUS
Qty: 4.5 ML | Refills: 1 | Status: SHIPPED | OUTPATIENT
Start: 2025-08-11

## 2025-08-11 SDOH — ECONOMIC STABILITY: FOOD INSECURITY: WITHIN THE PAST 12 MONTHS, YOU WORRIED THAT YOUR FOOD WOULD RUN OUT BEFORE YOU GOT MONEY TO BUY MORE.: NEVER TRUE

## 2025-08-11 SDOH — ECONOMIC STABILITY: FOOD INSECURITY: WITHIN THE PAST 12 MONTHS, THE FOOD YOU BOUGHT JUST DIDN'T LAST AND YOU DIDN'T HAVE MONEY TO GET MORE.: NEVER TRUE

## 2025-08-19 ENCOUNTER — OFFICE VISIT (OUTPATIENT)
Dept: SURGERY | Age: 65
End: 2025-08-19
Payer: MEDICARE

## 2025-08-19 VITALS
HEART RATE: 56 BPM | DIASTOLIC BLOOD PRESSURE: 72 MMHG | SYSTOLIC BLOOD PRESSURE: 120 MMHG | OXYGEN SATURATION: 94 % | TEMPERATURE: 97.5 F

## 2025-08-19 DIAGNOSIS — L72.3 SEBACEOUS CYST: Primary | ICD-10-CM

## 2025-08-19 PROCEDURE — 3078F DIAST BP <80 MM HG: CPT | Performed by: SURGERY

## 2025-08-19 PROCEDURE — 1036F TOBACCO NON-USER: CPT | Performed by: SURGERY

## 2025-08-19 PROCEDURE — 3074F SYST BP LT 130 MM HG: CPT | Performed by: SURGERY

## 2025-08-19 PROCEDURE — 1159F MED LIST DOCD IN RCRD: CPT | Performed by: SURGERY

## 2025-08-19 PROCEDURE — 99212 OFFICE O/P EST SF 10 MIN: CPT | Performed by: SURGERY

## 2025-08-19 PROCEDURE — G8417 CALC BMI ABV UP PARAM F/U: HCPCS | Performed by: SURGERY

## 2025-08-19 PROCEDURE — 3017F COLORECTAL CA SCREEN DOC REV: CPT | Performed by: SURGERY

## 2025-08-19 PROCEDURE — 1123F ACP DISCUSS/DSCN MKR DOCD: CPT | Performed by: SURGERY

## 2025-08-19 PROCEDURE — G8427 DOCREV CUR MEDS BY ELIG CLIN: HCPCS | Performed by: SURGERY

## (undated) DEVICE — CYSTO PACK: Brand: MEDLINE INDUSTRIES, INC.

## (undated) DEVICE — CATHETER URETH 22FR 30CC BLLN F 3 W SPEC M RND TIP TWO

## (undated) DEVICE — DRAINBAG,ANTI-REFLUX TOWER,L/F,2000ML,LL: Brand: MEDLINE

## (undated) DEVICE — SOLUTION IRRIG 2000ML STRL H2O UROMATIC PLAS CONT USP

## (undated) DEVICE — SET IRRIG L94IN ID0.281IN W/ 4.5IN DST FLX CONN 2 LD ON OFF

## (undated) DEVICE — LASER SURG W22XH58IN D36IN 475LB SLD STATE FREQ DOUBLED

## (undated) DEVICE — PLUG,CATHETER,DRAINAGE PROTECTOR,TUBE: Brand: MEDLINE

## (undated) DEVICE — SOLUTION IV IRRIG WATER 1000ML POUR BRL 2F7114